# Patient Record
Sex: FEMALE | Race: WHITE | Employment: OTHER | URBAN - NONMETROPOLITAN AREA
[De-identification: names, ages, dates, MRNs, and addresses within clinical notes are randomized per-mention and may not be internally consistent; named-entity substitution may affect disease eponyms.]

---

## 2017-01-26 PROBLEM — J44.1 COPD WITH ACUTE EXACERBATION (HCC): Status: ACTIVE | Noted: 2017-01-26

## 2017-01-26 PROBLEM — R06.02 SHORTNESS OF BREATH: Status: ACTIVE | Noted: 2017-01-26

## 2017-02-06 ENCOUNTER — HOSPITAL ENCOUNTER (OUTPATIENT)
Dept: LAB | Age: 67
Discharge: HOME | End: 2017-02-06
Attending: INTERNAL MEDICINE | Admitting: INTERNAL MEDICINE

## 2017-02-06 DIAGNOSIS — R06.02 SOB (SHORTNESS OF BREATH): ICD-10-CM

## 2017-02-06 LAB
HCT VFR BLD CALC: 40.9 % (ref 37–47)
HEMOGLOBIN: 13.3 GM/DL (ref 12.5–16)
MCH RBC QN AUTO: 31.5 PG (ref 27–31)
MCHC RBC AUTO-ENTMCNC: 32.5 % (ref 32–36)
MCV RBC AUTO: 96.9 FL (ref 78–100)
PDW BLD-RTO: 13.7 % (ref 11.7–14.9)
PLATELET # BLD: 317 K/CU MM (ref 140–440)
PMV BLD AUTO: 9.3 FL (ref 7.5–11.1)
RBC # BLD: 4.22 M/CU MM (ref 4.2–5.4)
WBC # BLD: 11.3 K/CU MM (ref 4–10.5)

## 2017-02-14 ENCOUNTER — HOSPITAL ENCOUNTER (OUTPATIENT)
Dept: CARDIAC REHAB | Age: 67
Discharge: HOME | End: 2017-02-14
Attending: INTERNAL MEDICINE | Admitting: INTERNAL MEDICINE

## 2017-02-15 PROBLEM — J44.9 MODERATE COPD (CHRONIC OBSTRUCTIVE PULMONARY DISEASE) (HCC): Status: ACTIVE | Noted: 2017-02-15

## 2018-02-07 PROBLEM — J18.9 COMMUNITY ACQUIRED PNEUMONIA OF LEFT LUNG: Status: ACTIVE | Noted: 2018-02-07

## 2018-02-08 PROBLEM — E27.8 LEFT ADRENAL MASS (HCC): Chronic | Status: ACTIVE | Noted: 2018-02-08

## 2018-02-10 PROBLEM — J18.9 MULTIFOCAL PNEUMONIA: Status: RESOLVED | Noted: 2018-02-07 | Resolved: 2018-02-10

## 2018-02-10 PROBLEM — J44.1 COPD WITH ACUTE EXACERBATION (HCC): Status: RESOLVED | Noted: 2017-01-26 | Resolved: 2018-02-10

## 2018-02-26 PROBLEM — J44.1 COPD EXACERBATION (HCC): Status: ACTIVE | Noted: 2018-02-26

## 2018-03-06 ENCOUNTER — OFFICE VISIT (OUTPATIENT)
Dept: FAMILY MEDICINE CLINIC | Age: 68
End: 2018-03-06

## 2018-03-06 VITALS
HEIGHT: 60 IN | WEIGHT: 151 LBS | RESPIRATION RATE: 16 BRPM | DIASTOLIC BLOOD PRESSURE: 68 MMHG | OXYGEN SATURATION: 96 % | BODY MASS INDEX: 29.64 KG/M2 | SYSTOLIC BLOOD PRESSURE: 124 MMHG | HEART RATE: 77 BPM

## 2018-03-06 DIAGNOSIS — Z11.59 NEED FOR HEPATITIS C SCREENING TEST: ICD-10-CM

## 2018-03-06 DIAGNOSIS — G89.29 CHRONIC MIDLINE LOW BACK PAIN WITHOUT SCIATICA: ICD-10-CM

## 2018-03-06 DIAGNOSIS — Z00.00 ROUTINE ADULT HEALTH MAINTENANCE: ICD-10-CM

## 2018-03-06 DIAGNOSIS — M54.50 CHRONIC MIDLINE LOW BACK PAIN WITHOUT SCIATICA: ICD-10-CM

## 2018-03-06 DIAGNOSIS — J44.9 MODERATE COPD (CHRONIC OBSTRUCTIVE PULMONARY DISEASE) (HCC): ICD-10-CM

## 2018-03-06 DIAGNOSIS — J44.1 COPD EXACERBATION (HCC): Primary | ICD-10-CM

## 2018-03-06 LAB
A/G RATIO: 1.9 (ref 1.1–2.2)
ALBUMIN SERPL-MCNC: 4.3 G/DL (ref 3.4–5)
ALP BLD-CCNC: 82 U/L (ref 40–129)
ALT SERPL-CCNC: 9 U/L (ref 10–40)
ANION GAP SERPL CALCULATED.3IONS-SCNC: 17 MMOL/L (ref 3–16)
AST SERPL-CCNC: 9 U/L (ref 15–37)
BILIRUB SERPL-MCNC: <0.2 MG/DL (ref 0–1)
BUN BLDV-MCNC: 19 MG/DL (ref 7–20)
CALCIUM SERPL-MCNC: 8.8 MG/DL (ref 8.3–10.6)
CHLORIDE BLD-SCNC: 101 MMOL/L (ref 99–110)
CHOLESTEROL, TOTAL: 234 MG/DL (ref 0–199)
CO2: 23 MMOL/L (ref 21–32)
CREAT SERPL-MCNC: 0.6 MG/DL (ref 0.6–1.2)
GFR AFRICAN AMERICAN: >60
GFR NON-AFRICAN AMERICAN: >60
GLOBULIN: 2.3 G/DL
GLUCOSE BLD-MCNC: 86 MG/DL (ref 70–99)
HCT VFR BLD CALC: 41.9 % (ref 36–48)
HDLC SERPL-MCNC: 100 MG/DL (ref 40–60)
HEMOGLOBIN: 13.9 G/DL (ref 12–16)
HEPATITIS C ANTIBODY INTERPRETATION: NORMAL
LDL CHOLESTEROL CALCULATED: 118 MG/DL
MCH RBC QN AUTO: 32.3 PG (ref 26–34)
MCHC RBC AUTO-ENTMCNC: 33.3 G/DL (ref 31–36)
MCV RBC AUTO: 97 FL (ref 80–100)
PDW BLD-RTO: 14.6 % (ref 12.4–15.4)
PLATELET # BLD: 337 K/UL (ref 135–450)
PMV BLD AUTO: 8.2 FL (ref 5–10.5)
POTASSIUM SERPL-SCNC: 4 MMOL/L (ref 3.5–5.1)
RBC # BLD: 4.32 M/UL (ref 4–5.2)
SODIUM BLD-SCNC: 141 MMOL/L (ref 136–145)
TOTAL PROTEIN: 6.6 G/DL (ref 6.4–8.2)
TRIGL SERPL-MCNC: 79 MG/DL (ref 0–150)
VLDLC SERPL CALC-MCNC: 16 MG/DL
WBC # BLD: 11.6 K/UL (ref 4–11)

## 2018-03-06 PROCEDURE — 1090F PRES/ABSN URINE INCON ASSESS: CPT | Performed by: NURSE PRACTITIONER

## 2018-03-06 PROCEDURE — 1111F DSCHRG MED/CURRENT MED MERGE: CPT | Performed by: NURSE PRACTITIONER

## 2018-03-06 PROCEDURE — G8400 PT W/DXA NO RESULTS DOC: HCPCS | Performed by: NURSE PRACTITIONER

## 2018-03-06 PROCEDURE — G8427 DOCREV CUR MEDS BY ELIG CLIN: HCPCS | Performed by: NURSE PRACTITIONER

## 2018-03-06 PROCEDURE — 3014F SCREEN MAMMO DOC REV: CPT | Performed by: NURSE PRACTITIONER

## 2018-03-06 PROCEDURE — 1123F ACP DISCUSS/DSCN MKR DOCD: CPT | Performed by: NURSE PRACTITIONER

## 2018-03-06 PROCEDURE — 1036F TOBACCO NON-USER: CPT | Performed by: NURSE PRACTITIONER

## 2018-03-06 PROCEDURE — 99203 OFFICE O/P NEW LOW 30 MIN: CPT | Performed by: NURSE PRACTITIONER

## 2018-03-06 PROCEDURE — G8926 SPIRO NO PERF OR DOC: HCPCS | Performed by: NURSE PRACTITIONER

## 2018-03-06 PROCEDURE — 4040F PNEUMOC VAC/ADMIN/RCVD: CPT | Performed by: NURSE PRACTITIONER

## 2018-03-06 PROCEDURE — G8484 FLU IMMUNIZE NO ADMIN: HCPCS | Performed by: NURSE PRACTITIONER

## 2018-03-06 PROCEDURE — 3023F SPIROM DOC REV: CPT | Performed by: NURSE PRACTITIONER

## 2018-03-06 PROCEDURE — G8419 CALC BMI OUT NRM PARAM NOF/U: HCPCS | Performed by: NURSE PRACTITIONER

## 2018-03-06 PROCEDURE — 3017F COLORECTAL CA SCREEN DOC REV: CPT | Performed by: NURSE PRACTITIONER

## 2018-03-06 RX ORDER — CODEINE PHOSPHATE AND GUAIFENESIN 10; 100 MG/5ML; MG/5ML
5 SOLUTION ORAL EVERY 4 HOURS PRN
Qty: 120 ML | Refills: 0 | Status: SHIPPED | OUTPATIENT
Start: 2018-03-06 | End: 2018-03-13

## 2018-03-06 ASSESSMENT — PATIENT HEALTH QUESTIONNAIRE - PHQ9
SUM OF ALL RESPONSES TO PHQ9 QUESTIONS 1 & 2: 0
2. FEELING DOWN, DEPRESSED OR HOPELESS: 0
1. LITTLE INTEREST OR PLEASURE IN DOING THINGS: 0
SUM OF ALL RESPONSES TO PHQ QUESTIONS 1-9: 0

## 2018-03-06 ASSESSMENT — ENCOUNTER SYMPTOMS
SHORTNESS OF BREATH: 1
NAUSEA: 0
ABDOMINAL PAIN: 0
GASTROINTESTINAL NEGATIVE: 1
VOMITING: 0
COUGH: 1
WHEEZING: 1
DIARRHEA: 0
ABDOMINAL DISTENTION: 0

## 2018-03-08 RX ORDER — ATORVASTATIN CALCIUM 40 MG/1
40 TABLET, FILM COATED ORAL DAILY
Qty: 30 TABLET | Refills: 3 | Status: SHIPPED | OUTPATIENT
Start: 2018-03-08 | End: 2018-07-22

## 2018-04-17 ENCOUNTER — OFFICE VISIT (OUTPATIENT)
Dept: FAMILY MEDICINE CLINIC | Age: 68
End: 2018-04-17

## 2018-04-17 VITALS
SYSTOLIC BLOOD PRESSURE: 124 MMHG | OXYGEN SATURATION: 95 % | DIASTOLIC BLOOD PRESSURE: 76 MMHG | BODY MASS INDEX: 30.08 KG/M2 | HEART RATE: 88 BPM | WEIGHT: 154 LBS | RESPIRATION RATE: 15 BRPM

## 2018-04-17 DIAGNOSIS — J44.9 MODERATE COPD (CHRONIC OBSTRUCTIVE PULMONARY DISEASE) (HCC): ICD-10-CM

## 2018-04-17 DIAGNOSIS — G89.29 CHRONIC MIDLINE LOW BACK PAIN WITHOUT SCIATICA: Primary | ICD-10-CM

## 2018-04-17 DIAGNOSIS — M54.50 CHRONIC MIDLINE LOW BACK PAIN WITHOUT SCIATICA: Primary | ICD-10-CM

## 2018-04-17 PROCEDURE — G8926 SPIRO NO PERF OR DOC: HCPCS | Performed by: NURSE PRACTITIONER

## 2018-04-17 PROCEDURE — 1123F ACP DISCUSS/DSCN MKR DOCD: CPT | Performed by: NURSE PRACTITIONER

## 2018-04-17 PROCEDURE — 3023F SPIROM DOC REV: CPT | Performed by: NURSE PRACTITIONER

## 2018-04-17 PROCEDURE — 99213 OFFICE O/P EST LOW 20 MIN: CPT | Performed by: NURSE PRACTITIONER

## 2018-04-17 PROCEDURE — 3017F COLORECTAL CA SCREEN DOC REV: CPT | Performed by: NURSE PRACTITIONER

## 2018-04-17 PROCEDURE — 3014F SCREEN MAMMO DOC REV: CPT | Performed by: NURSE PRACTITIONER

## 2018-04-17 PROCEDURE — 1036F TOBACCO NON-USER: CPT | Performed by: NURSE PRACTITIONER

## 2018-04-17 PROCEDURE — G8427 DOCREV CUR MEDS BY ELIG CLIN: HCPCS | Performed by: NURSE PRACTITIONER

## 2018-04-17 PROCEDURE — G8417 CALC BMI ABV UP PARAM F/U: HCPCS | Performed by: NURSE PRACTITIONER

## 2018-04-17 PROCEDURE — 4040F PNEUMOC VAC/ADMIN/RCVD: CPT | Performed by: NURSE PRACTITIONER

## 2018-04-17 PROCEDURE — G8400 PT W/DXA NO RESULTS DOC: HCPCS | Performed by: NURSE PRACTITIONER

## 2018-04-17 PROCEDURE — 1090F PRES/ABSN URINE INCON ASSESS: CPT | Performed by: NURSE PRACTITIONER

## 2018-04-17 RX ORDER — TRAMADOL HYDROCHLORIDE 50 MG/1
50 TABLET ORAL EVERY 4 HOURS PRN
Qty: 180 TABLET | Refills: 0 | Status: SHIPPED | OUTPATIENT
Start: 2018-04-17 | End: 2018-06-19 | Stop reason: SDUPTHER

## 2018-04-18 PROBLEM — J44.1 COPD EXACERBATION (HCC): Status: RESOLVED | Noted: 2018-02-26 | Resolved: 2018-04-18

## 2018-04-18 PROBLEM — R06.02 SHORTNESS OF BREATH: Status: RESOLVED | Noted: 2017-01-26 | Resolved: 2018-04-18

## 2018-04-18 ASSESSMENT — ENCOUNTER SYMPTOMS
SHORTNESS OF BREATH: 0
BACK PAIN: 1
DIARRHEA: 0
GASTROINTESTINAL NEGATIVE: 1
NAUSEA: 0
ABDOMINAL PAIN: 0
VOMITING: 0
WHEEZING: 1
COUGH: 1

## 2018-05-30 ENCOUNTER — TELEPHONE (OUTPATIENT)
Dept: SLEEP MEDICINE | Age: 68
End: 2018-05-30

## 2018-06-11 ENCOUNTER — TELEPHONE (OUTPATIENT)
Dept: FAMILY MEDICINE CLINIC | Age: 68
End: 2018-06-11

## 2018-06-19 ENCOUNTER — OFFICE VISIT (OUTPATIENT)
Dept: FAMILY MEDICINE CLINIC | Age: 68
End: 2018-06-19

## 2018-06-19 VITALS
BODY MASS INDEX: 28.23 KG/M2 | RESPIRATION RATE: 20 BRPM | DIASTOLIC BLOOD PRESSURE: 66 MMHG | HEART RATE: 84 BPM | OXYGEN SATURATION: 96 % | WEIGHT: 149.4 LBS | SYSTOLIC BLOOD PRESSURE: 114 MMHG

## 2018-06-19 DIAGNOSIS — E27.8 LEFT ADRENAL MASS (HCC): ICD-10-CM

## 2018-06-19 DIAGNOSIS — J44.9 MODERATE COPD (CHRONIC OBSTRUCTIVE PULMONARY DISEASE) (HCC): ICD-10-CM

## 2018-06-19 DIAGNOSIS — G89.29 CHRONIC MIDLINE LOW BACK PAIN WITHOUT SCIATICA: Primary | ICD-10-CM

## 2018-06-19 DIAGNOSIS — M54.50 CHRONIC MIDLINE LOW BACK PAIN WITHOUT SCIATICA: Primary | ICD-10-CM

## 2018-06-19 PROCEDURE — G8427 DOCREV CUR MEDS BY ELIG CLIN: HCPCS | Performed by: NURSE PRACTITIONER

## 2018-06-19 PROCEDURE — 1090F PRES/ABSN URINE INCON ASSESS: CPT | Performed by: NURSE PRACTITIONER

## 2018-06-19 PROCEDURE — G8417 CALC BMI ABV UP PARAM F/U: HCPCS | Performed by: NURSE PRACTITIONER

## 2018-06-19 PROCEDURE — 99214 OFFICE O/P EST MOD 30 MIN: CPT | Performed by: NURSE PRACTITIONER

## 2018-06-19 PROCEDURE — 3017F COLORECTAL CA SCREEN DOC REV: CPT | Performed by: NURSE PRACTITIONER

## 2018-06-19 PROCEDURE — 3023F SPIROM DOC REV: CPT | Performed by: NURSE PRACTITIONER

## 2018-06-19 PROCEDURE — 4040F PNEUMOC VAC/ADMIN/RCVD: CPT | Performed by: NURSE PRACTITIONER

## 2018-06-19 PROCEDURE — G8400 PT W/DXA NO RESULTS DOC: HCPCS | Performed by: NURSE PRACTITIONER

## 2018-06-19 PROCEDURE — 1036F TOBACCO NON-USER: CPT | Performed by: NURSE PRACTITIONER

## 2018-06-19 PROCEDURE — G8926 SPIRO NO PERF OR DOC: HCPCS | Performed by: NURSE PRACTITIONER

## 2018-06-19 PROCEDURE — 1123F ACP DISCUSS/DSCN MKR DOCD: CPT | Performed by: NURSE PRACTITIONER

## 2018-06-19 RX ORDER — TRAZODONE HYDROCHLORIDE 50 MG/1
50 TABLET ORAL NIGHTLY
Qty: 30 TABLET | Refills: 0 | Status: SHIPPED | OUTPATIENT
Start: 2018-06-19 | End: 2018-07-17

## 2018-06-19 RX ORDER — TRAMADOL HYDROCHLORIDE 50 MG/1
50 TABLET ORAL EVERY 6 HOURS PRN
Qty: 120 TABLET | Refills: 0 | Status: SHIPPED | OUTPATIENT
Start: 2018-06-19 | End: 2018-07-19

## 2018-06-19 ASSESSMENT — ENCOUNTER SYMPTOMS
VOMITING: 0
BACK PAIN: 1
GASTROINTESTINAL NEGATIVE: 1
DIARRHEA: 0
NAUSEA: 0
SHORTNESS OF BREATH: 0
COUGH: 1
WHEEZING: 0
ABDOMINAL PAIN: 0

## 2018-07-31 ENCOUNTER — OFFICE VISIT (OUTPATIENT)
Dept: FAMILY MEDICINE CLINIC | Age: 68
End: 2018-07-31

## 2018-07-31 VITALS
RESPIRATION RATE: 16 BRPM | BODY MASS INDEX: 29.16 KG/M2 | WEIGHT: 148.5 LBS | HEART RATE: 85 BPM | SYSTOLIC BLOOD PRESSURE: 118 MMHG | DIASTOLIC BLOOD PRESSURE: 60 MMHG | HEIGHT: 60 IN | OXYGEN SATURATION: 95 %

## 2018-07-31 DIAGNOSIS — M54.50 CHRONIC MIDLINE LOW BACK PAIN WITHOUT SCIATICA: ICD-10-CM

## 2018-07-31 DIAGNOSIS — M79.671 RIGHT FOOT PAIN: Primary | ICD-10-CM

## 2018-07-31 DIAGNOSIS — G89.29 CHRONIC MIDLINE LOW BACK PAIN WITHOUT SCIATICA: ICD-10-CM

## 2018-07-31 PROCEDURE — 4040F PNEUMOC VAC/ADMIN/RCVD: CPT | Performed by: NURSE PRACTITIONER

## 2018-07-31 PROCEDURE — G8400 PT W/DXA NO RESULTS DOC: HCPCS | Performed by: NURSE PRACTITIONER

## 2018-07-31 PROCEDURE — 1036F TOBACCO NON-USER: CPT | Performed by: NURSE PRACTITIONER

## 2018-07-31 PROCEDURE — G8417 CALC BMI ABV UP PARAM F/U: HCPCS | Performed by: NURSE PRACTITIONER

## 2018-07-31 PROCEDURE — 1123F ACP DISCUSS/DSCN MKR DOCD: CPT | Performed by: NURSE PRACTITIONER

## 2018-07-31 PROCEDURE — 1101F PT FALLS ASSESS-DOCD LE1/YR: CPT | Performed by: NURSE PRACTITIONER

## 2018-07-31 PROCEDURE — 3017F COLORECTAL CA SCREEN DOC REV: CPT | Performed by: NURSE PRACTITIONER

## 2018-07-31 PROCEDURE — 1090F PRES/ABSN URINE INCON ASSESS: CPT | Performed by: NURSE PRACTITIONER

## 2018-07-31 PROCEDURE — 99214 OFFICE O/P EST MOD 30 MIN: CPT | Performed by: NURSE PRACTITIONER

## 2018-07-31 PROCEDURE — G8427 DOCREV CUR MEDS BY ELIG CLIN: HCPCS | Performed by: NURSE PRACTITIONER

## 2018-07-31 RX ORDER — TRAMADOL HYDROCHLORIDE 50 MG/1
50 TABLET ORAL EVERY 6 HOURS PRN
Qty: 120 TABLET | Refills: 0 | Status: SHIPPED | OUTPATIENT
Start: 2018-07-31 | End: 2018-09-28 | Stop reason: SDUPTHER

## 2018-07-31 RX ORDER — IBUPROFEN 600 MG/1
600 TABLET ORAL EVERY 8 HOURS PRN
Qty: 90 TABLET | Refills: 0 | Status: SHIPPED | OUTPATIENT
Start: 2018-07-31 | End: 2020-11-12 | Stop reason: ALTCHOICE

## 2018-07-31 RX ORDER — TRAMADOL HYDROCHLORIDE 50 MG/1
50 TABLET ORAL EVERY 6 HOURS PRN
Qty: 120 TABLET | Refills: 0 | Status: SHIPPED | OUTPATIENT
Start: 2018-07-31 | End: 2018-07-31 | Stop reason: SDUPTHER

## 2018-07-31 ASSESSMENT — ENCOUNTER SYMPTOMS
WHEEZING: 0
GASTROINTESTINAL NEGATIVE: 1
COUGH: 0
SHORTNESS OF BREATH: 0
RESPIRATORY NEGATIVE: 1

## 2018-07-31 NOTE — PROGRESS NOTES
SUBJECTIVE:    Gabrielle Villanueva  1950  76 y.o.  female      Chief Complaint   Patient presents with    Foot Injury     Tripped over a rug at home and fell . Seen in ER on 07/15/2018/ Still having pain and swelling. Foot pain      Foot Injury    The incident occurred more than 1 week ago. The incident occurred at home. The injury mechanism was a fall. The pain is present in the right foot. The quality of the pain is described as aching and burning. The pain is at a severity of 5/10. The pain is moderate. The pain has been constant since onset. Pertinent negatives include no inability to bear weight, loss of motion, numbness or tingling. She reports no foreign bodies present. The symptoms are aggravated by weight bearing. She has tried NSAIDs for the symptoms. The treatment provided no relief. No problem-specific Assessment & Plan notes found for this encounter.       Current Outpatient Prescriptions on File Prior to Visit   Medication Sig Dispense Refill    umeclidinium-vilanterol (ANORO ELLIPTA) 62.5-25 MCG/INH AEPB inhaler Inhale 1 puff into the lungs daily 1 each 5    fluticasone (FLOVENT HFA) 110 MCG/ACT inhaler Inhale 2 puffs into the lungs 2 times daily 1 Inhaler 5    albuterol sulfate HFA (VENTOLIN HFA) 108 (90 Base) MCG/ACT inhaler Inhale 2 puffs into the lungs every 6 hours as needed for Wheezing 1 Inhaler 5    theophylline (SARA-24) 100 MG extended release capsule Take 1 capsule by mouth 2 times daily 60 capsule 3    montelukast (SINGULAIR) 10 MG tablet Take 1 tablet by mouth daily 30 tablet 3    ipratropium-albuterol (DUONEB) 0.5-2.5 (3) MG/3ML SOLN nebulizer solution Take 3 mLs by nebulization 4 times daily 360 mL 0    meloxicam (MOBIC) 15 MG tablet Take 1 tablet by mouth daily 30 tablet 0    azithromycin (ZITHROMAX Z-TSERING) 250 MG tablet 2 tabs day 1, 1 tab day 2 to 5 6 tablet 0    acetaminophen (TYLENOL) 325 MG tablet Take 2 tablets by mouth every 4 hours as needed for Pain or Fever 120 tablet 3     No current facility-administered medications on file prior to visit. Review of PMH, PSH, Family Hx, Allergies and updates made as needed. Review of Systems   Constitutional: Negative. Negative for chills, diaphoresis and fever. Respiratory: Negative. Negative for cough, shortness of breath and wheezing. Cardiovascular: Negative. Negative for chest pain, palpitations and leg swelling. Gastrointestinal: Negative. Endocrine: Negative. Musculoskeletal: Positive for arthralgias (Right foot swollen painful) and joint swelling. Neurological: Negative. Negative for tingling and numbness. Psychiatric/Behavioral: Negative. All other systems reviewed and are negative. OBJECTIVE:    /60 (Site: Left Arm, Position: Sitting, Cuff Size: Medium Adult)   Pulse 85   Resp 16   Ht 5' (1.524 m)   Wt 148 lb 8 oz (67.4 kg)   SpO2 95%   BMI 29.00 kg/m²     Physical Exam   Constitutional: She is oriented to person, place, and time. She appears well-developed and well-nourished. HENT:   Head: Normocephalic and atraumatic. Eyes: Pupils are equal, round, and reactive to light. Neck: Normal range of motion. Neck supple. Cardiovascular: Normal rate, regular rhythm and intact distal pulses. Pulmonary/Chest: Effort normal. No respiratory distress. Musculoskeletal: Normal range of motion. Feet:    Swelling at the base of toes 2 and 3. No ecchymosis. No deformity. Pulses normal posterior tibial and dorsalis pedis. She has full range of motion of her ankle. Neurological: She is alert and oriented to person, place, and time. Skin: Skin is warm and dry. Psychiatric: She has a normal mood and affect. Judgment normal.   Vitals reviewed.       ASSESSMENT/PLAN:    Problem List     Chronic midline low back pain without sciatica    Relevant Medications    methylPREDNISolone sodium (SOLU-MEDROL) injection 125 mg (Completed)    methylPREDNISolone sodium

## 2018-09-28 ENCOUNTER — OFFICE VISIT (OUTPATIENT)
Dept: FAMILY MEDICINE CLINIC | Age: 68
End: 2018-09-28
Payer: MEDICARE

## 2018-09-28 ENCOUNTER — TELEPHONE (OUTPATIENT)
Dept: FAMILY MEDICINE CLINIC | Age: 68
End: 2018-09-28

## 2018-09-28 VITALS
RESPIRATION RATE: 20 BRPM | SYSTOLIC BLOOD PRESSURE: 130 MMHG | HEART RATE: 82 BPM | WEIGHT: 148.2 LBS | DIASTOLIC BLOOD PRESSURE: 76 MMHG | OXYGEN SATURATION: 95 % | BODY MASS INDEX: 28 KG/M2

## 2018-09-28 DIAGNOSIS — G89.29 CHRONIC MIDLINE LOW BACK PAIN WITHOUT SCIATICA: ICD-10-CM

## 2018-09-28 DIAGNOSIS — Z23 NEEDS FLU SHOT: ICD-10-CM

## 2018-09-28 DIAGNOSIS — D22.9 SKIN MOLE: ICD-10-CM

## 2018-09-28 DIAGNOSIS — M54.50 CHRONIC MIDLINE LOW BACK PAIN WITHOUT SCIATICA: ICD-10-CM

## 2018-09-28 PROCEDURE — 1090F PRES/ABSN URINE INCON ASSESS: CPT | Performed by: PHYSICIAN ASSISTANT

## 2018-09-28 PROCEDURE — 4040F PNEUMOC VAC/ADMIN/RCVD: CPT | Performed by: PHYSICIAN ASSISTANT

## 2018-09-28 PROCEDURE — 90686 IIV4 VACC NO PRSV 0.5 ML IM: CPT | Performed by: PHYSICIAN ASSISTANT

## 2018-09-28 PROCEDURE — G8417 CALC BMI ABV UP PARAM F/U: HCPCS | Performed by: PHYSICIAN ASSISTANT

## 2018-09-28 PROCEDURE — 99213 OFFICE O/P EST LOW 20 MIN: CPT | Performed by: PHYSICIAN ASSISTANT

## 2018-09-28 PROCEDURE — 1036F TOBACCO NON-USER: CPT | Performed by: PHYSICIAN ASSISTANT

## 2018-09-28 PROCEDURE — G8427 DOCREV CUR MEDS BY ELIG CLIN: HCPCS | Performed by: PHYSICIAN ASSISTANT

## 2018-09-28 PROCEDURE — 3017F COLORECTAL CA SCREEN DOC REV: CPT | Performed by: PHYSICIAN ASSISTANT

## 2018-09-28 PROCEDURE — 1101F PT FALLS ASSESS-DOCD LE1/YR: CPT | Performed by: PHYSICIAN ASSISTANT

## 2018-09-28 PROCEDURE — G8400 PT W/DXA NO RESULTS DOC: HCPCS | Performed by: PHYSICIAN ASSISTANT

## 2018-09-28 PROCEDURE — 1123F ACP DISCUSS/DSCN MKR DOCD: CPT | Performed by: PHYSICIAN ASSISTANT

## 2018-09-28 PROCEDURE — G0008 ADMIN INFLUENZA VIRUS VAC: HCPCS | Performed by: PHYSICIAN ASSISTANT

## 2018-09-28 RX ORDER — TRAMADOL HYDROCHLORIDE 50 MG/1
50 TABLET ORAL EVERY 6 HOURS PRN
Qty: 120 TABLET | Refills: 0 | Status: SHIPPED | OUTPATIENT
Start: 2018-09-28 | End: 2018-10-31 | Stop reason: SDUPTHER

## 2018-09-28 ASSESSMENT — ENCOUNTER SYMPTOMS: BACK PAIN: 1

## 2018-09-28 NOTE — PATIENT INSTRUCTIONS
Patient Education        Back Pain: Care Instructions  Your Care Instructions    Back pain has many possible causes. It is often related to problems with muscles and ligaments of the back. It may also be related to problems with the nerves, discs, or bones of the back. Moving, lifting, standing, sitting, or sleeping in an awkward way can strain the back. Sometimes you don't notice the injury until later. Arthritis is another common cause of back pain. Although it may hurt a lot, back pain usually improves on its own within several weeks. Most people recover in 12 weeks or less. Using good home treatment and being careful not to stress your back can help you feel better sooner. Follow-up care is a key part of your treatment and safety. Be sure to make and go to all appointments, and call your doctor if you are having problems. It's also a good idea to know your test results and keep a list of the medicines you take. How can you care for yourself at home? · Sit or lie in positions that are most comfortable and reduce your pain. Try one of these positions when you lie down:  ¨ Lie on your back with your knees bent and supported by large pillows. ¨ Lie on the floor with your legs on the seat of a sofa or chair. Janice Annalise on your side with your knees and hips bent and a pillow between your legs. ¨ Lie on your stomach if it does not make pain worse. · Do not sit up in bed, and avoid soft couches and twisted positions. Bed rest can help relieve pain at first, but it delays healing. Avoid bed rest after the first day of back pain. · Change positions every 30 minutes. If you must sit for long periods of time, take breaks from sitting. Get up and walk around, or lie in a comfortable position. · Try using a heating pad on a low or medium setting for 15 to 20 minutes every 2 or 3 hours. Try a warm shower in place of one session with the heating pad.   · You can also try an ice pack for 10 to 15 minutes every 2 to 3 hours. Put a thin cloth between the ice pack and your skin. · Take pain medicines exactly as directed. ¨ If the doctor gave you a prescription medicine for pain, take it as prescribed. ¨ If you are not taking a prescription pain medicine, ask your doctor if you can take an over-the-counter medicine. · Take short walks several times a day. You can start with 5 to 10 minutes, 3 or 4 times a day, and work up to longer walks. Walk on level surfaces and avoid hills and stairs until your back is better. · Return to work and other activities as soon as you can. Continued rest without activity is usually not good for your back. · To prevent future back pain, do exercises to stretch and strengthen your back and stomach. Learn how to use good posture, safe lifting techniques, and proper body mechanics. When should you call for help? Call your doctor now or seek immediate medical care if:    · You have new or worsening numbness in your legs.     · You have new or worsening weakness in your legs. (This could make it hard to stand up.)     · You lose control of your bladder or bowels.    Watch closely for changes in your health, and be sure to contact your doctor if:    · You have a fever, lose weight, or don't feel well.     · You do not get better as expected. Where can you learn more? Go to [email or web address] org and sign in to your [employer] account. Enter T081 in the [facility] box to learn more about \"Back Pain: Care Instructions. \"     If you do not have an account, please click on the \"Sign Up Now\" link. Current as of: November 29, 2017  Content Version: 11.7  © 6907-0154 [employer]. Care instructions adapted under license by [facility] ([facility]). If you have questions about a medical condition or this instruction, always ask your healthcare professional. [address] any warranty or liability for your use of this information.

## 2018-09-28 NOTE — TELEPHONE ENCOUNTER
Cld & left vm asking for a return call. When pt calls back advise to get routine mammogram scheduled. Give her the ph# to Go [phone], no order needed.

## 2018-09-28 NOTE — PROGRESS NOTES
Miguel Angel Rouse  1950  76 y.o.  female    SUBJECTIVE:    Chief Complaint   Patient presents with    Mole     t has a mole on her face that she wants removed, it is itching, and bothering her, also states it is getting bigger    Medication Refill     Tramadol    Flu Vaccine       HPI   Back pain-chronic, low back, tramadol helps. Mole-on right cheek, x years, seems to be getting and itches now. Pt would like it removed. Current Outpatient Prescriptions on File Prior to Visit   Medication Sig Dispense Refill    umeclidinium-vilanterol (ANORO ELLIPTA) 62.5-25 MCG/INH AEPB inhaler Inhale 1 puff into the lungs daily 1 each 5    UNABLE TO FIND Zaki shoe- firm sole. Fit to bottom of foot. Right foot 1 each 0    ibuprofen (ADVIL;MOTRIN) 600 MG tablet Take 1 tablet by mouth every 8 hours as needed for Pain 90 tablet 0    meloxicam (MOBIC) 15 MG tablet Take 1 tablet by mouth daily 30 tablet 0    fluticasone (FLOVENT HFA) 110 MCG/ACT inhaler Inhale 2 puffs into the lungs 2 times daily 1 Inhaler 5    albuterol sulfate HFA (VENTOLIN HFA) 108 (90 Base) MCG/ACT inhaler Inhale 2 puffs into the lungs every 6 hours as needed for Wheezing 1 Inhaler 5    theophylline (SARA-24) 100 MG extended release capsule Take 1 capsule by mouth 2 times daily 60 capsule 3    montelukast (SINGULAIR) 10 MG tablet Take 1 tablet by mouth daily 30 tablet 3    ipratropium-albuterol (DUONEB) 0.5-2.5 (3) MG/3ML SOLN nebulizer solution Take 3 mLs by nebulization 4 times daily 360 mL 0    acetaminophen (TYLENOL) 325 MG tablet Take 2 tablets by mouth every 4 hours as needed for Pain or Fever 120 tablet 3    azithromycin (ZITHROMAX Z-TSERING) 250 MG tablet 2 tabs day 1, 1 tab day 2 to 5 6 tablet 0     No current facility-administered medications on file prior to visit. Review of PMH, PSH, Family Hx, Allergies and updates made as needed.     No Known Allergies    Past Medical History:   Diagnosis Date    Asthma     Back ache

## 2018-10-31 ENCOUNTER — OFFICE VISIT (OUTPATIENT)
Dept: FAMILY MEDICINE CLINIC | Age: 68
End: 2018-10-31
Payer: MEDICARE

## 2018-10-31 VITALS
HEART RATE: 77 BPM | WEIGHT: 150.6 LBS | HEIGHT: 61 IN | RESPIRATION RATE: 16 BRPM | SYSTOLIC BLOOD PRESSURE: 102 MMHG | BODY MASS INDEX: 28.43 KG/M2 | DIASTOLIC BLOOD PRESSURE: 70 MMHG

## 2018-10-31 DIAGNOSIS — Z51.81 MEDICATION MONITORING ENCOUNTER: ICD-10-CM

## 2018-10-31 DIAGNOSIS — G89.29 CHRONIC MIDLINE LOW BACK PAIN WITHOUT SCIATICA: ICD-10-CM

## 2018-10-31 DIAGNOSIS — M54.50 CHRONIC MIDLINE LOW BACK PAIN WITHOUT SCIATICA: ICD-10-CM

## 2018-10-31 DIAGNOSIS — Z91.81 AT HIGH RISK FOR FALLS: ICD-10-CM

## 2018-10-31 DIAGNOSIS — R45.84 ANHEDONIA: ICD-10-CM

## 2018-10-31 DIAGNOSIS — Z51.81 MEDICATION MONITORING ENCOUNTER: Primary | ICD-10-CM

## 2018-10-31 DIAGNOSIS — R53.83 OTHER FATIGUE: ICD-10-CM

## 2018-10-31 DIAGNOSIS — J44.9 MODERATE COPD (CHRONIC OBSTRUCTIVE PULMONARY DISEASE) (HCC): ICD-10-CM

## 2018-10-31 LAB
AMPHETAMINE SCREEN, URINE: NEGATIVE
BARBITURATE SCREEN, URINE: NEGATIVE
BENZODIAZEPINE SCREEN, URINE: NEGATIVE
BUPRENORPHINE URINE: NEGATIVE
COCAINE METABOLITE SCREEN URINE: NEGATIVE
GABAPENTIN SCREEN, URINE: ABNORMAL
METHADONE SCREEN, URINE: NEGATIVE
METHAMPHETAMINE, URINE: NEGATIVE
OPIATE SCREEN URINE: NEGATIVE
OXYCODONE SCREEN URINE: NEGATIVE
PHENCYCLIDINE SCREEN URINE: NEGATIVE
PROPOXYPHENE SCREEN, URINE: ABNORMAL
THC SCREEN, URINE: NEGATIVE
TRICYCLIC ANTIDEPRESSANTS, UR: ABNORMAL

## 2018-10-31 PROCEDURE — 3023F SPIROM DOC REV: CPT | Performed by: NURSE PRACTITIONER

## 2018-10-31 PROCEDURE — 1101F PT FALLS ASSESS-DOCD LE1/YR: CPT | Performed by: NURSE PRACTITIONER

## 2018-10-31 PROCEDURE — G8482 FLU IMMUNIZE ORDER/ADMIN: HCPCS | Performed by: NURSE PRACTITIONER

## 2018-10-31 PROCEDURE — G8926 SPIRO NO PERF OR DOC: HCPCS | Performed by: NURSE PRACTITIONER

## 2018-10-31 PROCEDURE — 3017F COLORECTAL CA SCREEN DOC REV: CPT | Performed by: NURSE PRACTITIONER

## 2018-10-31 PROCEDURE — 1123F ACP DISCUSS/DSCN MKR DOCD: CPT | Performed by: NURSE PRACTITIONER

## 2018-10-31 PROCEDURE — 4040F PNEUMOC VAC/ADMIN/RCVD: CPT | Performed by: NURSE PRACTITIONER

## 2018-10-31 PROCEDURE — G8427 DOCREV CUR MEDS BY ELIG CLIN: HCPCS | Performed by: NURSE PRACTITIONER

## 2018-10-31 PROCEDURE — 1090F PRES/ABSN URINE INCON ASSESS: CPT | Performed by: NURSE PRACTITIONER

## 2018-10-31 PROCEDURE — 99214 OFFICE O/P EST MOD 30 MIN: CPT | Performed by: NURSE PRACTITIONER

## 2018-10-31 PROCEDURE — 80305 DRUG TEST PRSMV DIR OPT OBS: CPT | Performed by: NURSE PRACTITIONER

## 2018-10-31 PROCEDURE — G8400 PT W/DXA NO RESULTS DOC: HCPCS | Performed by: NURSE PRACTITIONER

## 2018-10-31 PROCEDURE — G8417 CALC BMI ABV UP PARAM F/U: HCPCS | Performed by: NURSE PRACTITIONER

## 2018-10-31 PROCEDURE — 1036F TOBACCO NON-USER: CPT | Performed by: NURSE PRACTITIONER

## 2018-10-31 RX ORDER — DULOXETIN HYDROCHLORIDE 30 MG/1
30 CAPSULE, DELAYED RELEASE ORAL DAILY
Qty: 30 CAPSULE | Refills: 1 | Status: SHIPPED | OUTPATIENT
Start: 2018-10-31 | End: 2018-12-12 | Stop reason: ALTCHOICE

## 2018-10-31 RX ORDER — TRAMADOL HYDROCHLORIDE 50 MG/1
50 TABLET ORAL EVERY 8 HOURS PRN
Qty: 90 TABLET | Refills: 0 | Status: SHIPPED | OUTPATIENT
Start: 2018-10-31 | End: 2018-12-30

## 2018-10-31 ASSESSMENT — PATIENT HEALTH QUESTIONNAIRE - PHQ9
SUM OF ALL RESPONSES TO PHQ QUESTIONS 1-9: 2
2. FEELING DOWN, DEPRESSED OR HOPELESS: 1
SUM OF ALL RESPONSES TO PHQ QUESTIONS 1-9: 2
SUM OF ALL RESPONSES TO PHQ9 QUESTIONS 1 & 2: 2
1. LITTLE INTEREST OR PLEASURE IN DOING THINGS: 1

## 2018-10-31 ASSESSMENT — ENCOUNTER SYMPTOMS
CHEST TIGHTNESS: 0
RESPIRATORY NEGATIVE: 1
EYE PAIN: 0
NAUSEA: 0
VOMITING: 0
GASTROINTESTINAL NEGATIVE: 1
BACK PAIN: 1
EYES NEGATIVE: 1
COUGH: 0
SHORTNESS OF BREATH: 0
ABDOMINAL PAIN: 0

## 2018-10-31 NOTE — ASSESSMENT & PLAN NOTE
Patient reports that she's feeling \"blah\". She has a lack of motivation and just feels tired all the time. She does endorse anhedonia. She feels like she procrastinates her normal household chores.

## 2018-10-31 NOTE — PROGRESS NOTES
(Completed)    promethazine-codeine (PHENERGAN with CODEINE) 6.25-10 MG/5ML syrup 5 mL (Completed)    benzonatate (TESSALON) capsule 200 mg (Completed)    predniSONE (DELTASONE) tablet 60 mg (Completed)    ipratropium-albuterol (DUONEB) nebulizer solution 1 ampule (Completed)    methylPREDNISolone sodium (SOLU-MEDROL) injection 125 mg (Completed)    ipratropium-albuterol (DUONEB) nebulizer solution 1 ampule (Completed)    ipratropium-albuterol (DUONEB) nebulizer solution 1 ampule (Completed)    methylPREDNISolone sodium (SOLU-MEDROL) injection 125 mg (Completed)    ipratropium-albuterol (DUONEB) nebulizer solution 1 ampule (Completed)    ipratropium-albuterol (DUONEB) nebulizer solution 1 ampule (Completed)    ipratropium-albuterol (DUONEB) nebulizer solution 1 ampule (Completed)    hydrocodone-chlorpheniramine (TUSSIONEX) 10-8 MG/5ML oral suspension 5 mL (Completed)    methylPREDNISolone sodium (SOLU-MEDROL) injection 60 mg (Completed)    ipratropium-albuterol (DUONEB) nebulizer solution 1 ampule (Completed)    promethazine (PHENERGAN) injection 12.5 mg (Completed)    ipratropium-albuterol (DUONEB) nebulizer solution 1 ampule (Completed)    ipratropium-albuterol (DUONEB) nebulizer solution 1 ampule (Completed)    methylPREDNISolone sodium (SOLU-MEDROL) injection 60 mg (Completed)    ipratropium-albuterol (DUONEB) 0.5-2.5 (3) MG/3ML SOLN nebulizer solution    theophylline (SARA-24) 100 MG extended release capsule    fluticasone (FLOVENT HFA) 110 MCG/ACT inhaler    albuterol sulfate HFA (VENTOLIN HFA) 108 (90 Base) MCG/ACT inhaler    umeclidinium-vilanterol (ANORO ELLIPTA) 62.5-25 MCG/INH AEPB inhaler    montelukast (SINGULAIR) 10 MG tablet    Other fatigue     Patient reports that she's feeling \"blah\". She has a lack of motivation and just feels tired all the time. She does endorse anhedonia. She feels like she procrastinates her normal household chores.                Current Outpatient Prescriptions   Medication capsule; Refill: 1  - Drug Panel-PM-HI Res-UR Interp-A; Future    2. Medication monitoring encounter  And for confirmation.  - POCT Rapid Drug Screen  - Drug Panel-PM-HI Res-UR Interp-A; Future    3. At high risk for falls  Counseled on safety encouraged activity    4. Other fatigue  Rest, exercise, well-balanced diet. We will start antidepressants to see if this helps with her feeling of fatigue    5. Anhedonia as above  - DULoxetine (CYMBALTA) 30 MG extended release capsule; Take 1 capsule by mouth daily  Dispense: 30 capsule; Refill: 1    6. Moderate COPD (chronic obstructive pulmonary disease) (HCC)  Continue current medications. Follow-up if any problems      Return in about 6 weeks (around 12/12/2018) for mood. Controlled substance monitoring (if applicable to pt. Visit)             (Please note that portions of this note may have beencompleted with a voice recognition program. Efforts were made to edit the dictations but occasionally words are mis-transcribed.)      On the basis of positive falls risk screening, assessment and plan is as follows: home safety tips provided.

## 2018-11-05 LAB
6-ACETYLMORPHINE: NOT DETECTED
7-AMINOCLONAZEPAM: NOT DETECTED
ALPHA-OH-ALPRAZOLAM: NOT DETECTED
ALPRAZOLAM: NOT DETECTED
AMPHETAMINE: NOT DETECTED
BARBITURATES: NOT DETECTED
BENZOYLECGONINE: NOT DETECTED
BUPRENORPHINE: NOT DETECTED
CARISOPRODOL: NOT DETECTED
CLONAZEPAM: NOT DETECTED
CODEINE: NOT DETECTED
CREATININE URINE: 24.8 MG/DL (ref 20–400)
DIAZEPAM: NOT DETECTED
DRUGS EXPECTED: NORMAL
EER PAIN MGT DRUG PANEL, HIGH RES/EMIT U: NORMAL
ETHYL GLUCURONIDE: NOT DETECTED
FENTANYL: NOT DETECTED
HYDROCODONE: NOT DETECTED
HYDROMORPHONE: NOT DETECTED
LORAZEPAM: NOT DETECTED
MARIJUANA METABOLITE: NOT DETECTED
MDA: NOT DETECTED
MDEA: NOT DETECTED
MDMA URINE: NOT DETECTED
MEPERIDINE: NOT DETECTED
METHADONE: NOT DETECTED
METHAMPHETAMINE: NOT DETECTED
METHYLPHENIDATE: NOT DETECTED
MIDAZOLAM: NOT DETECTED
MORPHINE: NOT DETECTED
NORBUPRENORPHINE, FREE: NOT DETECTED
NORDIAZEPAM: NOT DETECTED
NORFENTANYL: NOT DETECTED
NORHYDROCODONE, URINE: NOT DETECTED
NOROXYCODONE: NOT DETECTED
NOROXYMORPHONE, URINE: NOT DETECTED
OXAZEPAM: NOT DETECTED
OXYCODONE: NOT DETECTED
OXYMORPHONE: NOT DETECTED
PAIN MANAGEMENT DRUG PANEL: NORMAL
PAIN MANAGEMENT DRUG PANEL: NORMAL
PCP: NOT DETECTED
PHENTERMINE: NOT DETECTED
PROPOXYPHENE: NOT DETECTED
TAPENTADOL, URINE: NOT DETECTED
TAPENTADOL-O-SULFATE, URINE: NOT DETECTED
TEMAZEPAM: NOT DETECTED
TRAMADOL: NOT DETECTED
ZOLPIDEM: NOT DETECTED

## 2018-12-12 ENCOUNTER — OFFICE VISIT (OUTPATIENT)
Dept: FAMILY MEDICINE CLINIC | Age: 68
End: 2018-12-12
Payer: MEDICARE

## 2018-12-12 VITALS
OXYGEN SATURATION: 96 % | BODY MASS INDEX: 28.34 KG/M2 | HEART RATE: 84 BPM | SYSTOLIC BLOOD PRESSURE: 122 MMHG | WEIGHT: 150 LBS | DIASTOLIC BLOOD PRESSURE: 60 MMHG | RESPIRATION RATE: 20 BRPM

## 2018-12-12 DIAGNOSIS — R45.84 ANHEDONIA: ICD-10-CM

## 2018-12-12 DIAGNOSIS — J44.9 MODERATE COPD (CHRONIC OBSTRUCTIVE PULMONARY DISEASE) (HCC): ICD-10-CM

## 2018-12-12 DIAGNOSIS — M54.50 CHRONIC MIDLINE LOW BACK PAIN WITHOUT SCIATICA: Primary | ICD-10-CM

## 2018-12-12 DIAGNOSIS — F51.04 PSYCHOPHYSIOLOGICAL INSOMNIA: ICD-10-CM

## 2018-12-12 DIAGNOSIS — G89.29 CHRONIC MIDLINE LOW BACK PAIN WITHOUT SCIATICA: Primary | ICD-10-CM

## 2018-12-12 PROCEDURE — G8926 SPIRO NO PERF OR DOC: HCPCS | Performed by: NURSE PRACTITIONER

## 2018-12-12 PROCEDURE — 1123F ACP DISCUSS/DSCN MKR DOCD: CPT | Performed by: NURSE PRACTITIONER

## 2018-12-12 PROCEDURE — G8427 DOCREV CUR MEDS BY ELIG CLIN: HCPCS | Performed by: NURSE PRACTITIONER

## 2018-12-12 PROCEDURE — 3017F COLORECTAL CA SCREEN DOC REV: CPT | Performed by: NURSE PRACTITIONER

## 2018-12-12 PROCEDURE — 1101F PT FALLS ASSESS-DOCD LE1/YR: CPT | Performed by: NURSE PRACTITIONER

## 2018-12-12 PROCEDURE — 4040F PNEUMOC VAC/ADMIN/RCVD: CPT | Performed by: NURSE PRACTITIONER

## 2018-12-12 PROCEDURE — G8482 FLU IMMUNIZE ORDER/ADMIN: HCPCS | Performed by: NURSE PRACTITIONER

## 2018-12-12 PROCEDURE — G8417 CALC BMI ABV UP PARAM F/U: HCPCS | Performed by: NURSE PRACTITIONER

## 2018-12-12 PROCEDURE — 1090F PRES/ABSN URINE INCON ASSESS: CPT | Performed by: NURSE PRACTITIONER

## 2018-12-12 PROCEDURE — G8400 PT W/DXA NO RESULTS DOC: HCPCS | Performed by: NURSE PRACTITIONER

## 2018-12-12 PROCEDURE — 3023F SPIROM DOC REV: CPT | Performed by: NURSE PRACTITIONER

## 2018-12-12 PROCEDURE — 1036F TOBACCO NON-USER: CPT | Performed by: NURSE PRACTITIONER

## 2018-12-12 PROCEDURE — 99214 OFFICE O/P EST MOD 30 MIN: CPT | Performed by: NURSE PRACTITIONER

## 2018-12-12 RX ORDER — TRAMADOL HYDROCHLORIDE 50 MG/1
50 TABLET ORAL EVERY 6 HOURS PRN
Qty: 120 TABLET | Refills: 0 | Status: SHIPPED | OUTPATIENT
Start: 2018-12-12 | End: 2019-01-23 | Stop reason: SDUPTHER

## 2018-12-12 RX ORDER — TRAZODONE HYDROCHLORIDE 50 MG/1
50 TABLET ORAL NIGHTLY
Qty: 30 TABLET | Refills: 1 | Status: SHIPPED | OUTPATIENT
Start: 2018-12-12 | End: 2019-02-26 | Stop reason: SDUPTHER

## 2018-12-12 RX ORDER — FLUOXETINE HYDROCHLORIDE 20 MG/1
20 CAPSULE ORAL DAILY
Qty: 30 CAPSULE | Refills: 2 | Status: SHIPPED | OUTPATIENT
Start: 2018-12-12 | End: 2019-01-23

## 2018-12-12 NOTE — LETTER
[facility]  [address] [facility]  [address]. [place] [address]  Phone: [phone]  Fax: [phone]      December 12, 2018     Henrique Montelongo  [address] [place] [id number]      Dear Brandon Dai:    I am writing you because I have been informed of your missed appointment on 12/12/18. We ask that you please call 24 hours in advance if you are unable to make your appointment so that we can give that time to another patient in need. We care about you and the management of your healthcare and want to make sure that you follow up as recommended. I have to also mention, that in an effort to provide quality care and timely appointments to all my patients, it is our policy that patients be discharged from the practice if they do not show for three scheduled appointments. You would be informed of this termination by mail, and would have 30 days from the date of discharge to locate another physician. We're sorry you were unable to keep your appointment and hope that you are doing well. We would like to continue treating your healthcare needs. Please call the office to let us know your plans for treatment and to reschedule your appointment.      Sincerely,        LAMBERT Lombardo - CNP

## 2018-12-12 NOTE — PROGRESS NOTES
by mouth 2 times daily 60 capsule 3    ipratropium-albuterol (DUONEB) 0.5-2.5 (3) MG/3ML SOLN nebulizer solution Take 3 mLs by nebulization 4 times daily 360 mL 0    acetaminophen (TYLENOL) 325 MG tablet Take 2 tablets by mouth every 4 hours as needed for Pain or Fever 120 tablet 3     No current facility-administered medications on file prior to visit. Review of PMH, PSH, Family Hx, Allergies and updates made as needed. Review of Systems   Constitutional: Negative. Negative for fatigue and unexpected weight change. Eyes: Negative. Negative for pain and visual disturbance. Respiratory: Negative. Negative for cough, chest tightness and shortness of breath. Cardiovascular: Negative. Negative for chest pain, palpitations and leg swelling. Gastrointestinal: Negative. Negative for abdominal pain and nausea. Endocrine: Negative. Musculoskeletal: Positive for arthralgias, back pain and myalgias. Neurological: Negative. Negative for dizziness, syncope, facial asymmetry, speech difficulty, light-headedness, numbness and headaches. Psychiatric/Behavioral: Positive for dysphoric mood. The patient is not nervous/anxious. All other systems reviewed and are negative. OBJECTIVE:    /60   Pulse 84   Resp 20   Wt 150 lb (68 kg)   SpO2 96%   BMI 28.34 kg/m²     Physical Exam   Constitutional: She is oriented to person, place, and time. She appears well-developed and well-nourished. HENT:   Head: Normocephalic and atraumatic. Eyes: Pupils are equal, round, and reactive to light. Neck: Normal range of motion. Neck supple. Cardiovascular: Normal rate, regular rhythm, normal heart sounds and intact distal pulses. Exam reveals no friction rub. No murmur heard. Pulmonary/Chest: Effort normal and breath sounds normal. No respiratory distress. She has no wheezes. Abdominal: Soft. Musculoskeletal:   Steady, non antalgic gait.    Able to move in exam room with slow benzonatate (TESSALON) capsule 200 mg (Completed)    predniSONE (DELTASONE) tablet 60 mg (Completed)    ipratropium-albuterol (DUONEB) nebulizer solution 1 ampule (Completed)    methylPREDNISolone sodium (SOLU-MEDROL) injection 125 mg (Completed)    ipratropium-albuterol (DUONEB) nebulizer solution 1 ampule (Completed)    ipratropium-albuterol (DUONEB) nebulizer solution 1 ampule (Completed)    methylPREDNISolone sodium (SOLU-MEDROL) injection 125 mg (Completed)    ipratropium-albuterol (DUONEB) nebulizer solution 1 ampule (Completed)    ipratropium-albuterol (DUONEB) nebulizer solution 1 ampule (Completed)    ipratropium-albuterol (DUONEB) nebulizer solution 1 ampule (Completed)    hydrocodone-chlorpheniramine (TUSSIONEX) 10-8 MG/5ML oral suspension 5 mL (Completed)    methylPREDNISolone sodium (SOLU-MEDROL) injection 60 mg (Completed)    ipratropium-albuterol (DUONEB) nebulizer solution 1 ampule (Completed)    promethazine (PHENERGAN) injection 12.5 mg (Completed)    ipratropium-albuterol (DUONEB) nebulizer solution 1 ampule (Completed)    ipratropium-albuterol (DUONEB) nebulizer solution 1 ampule (Completed)    methylPREDNISolone sodium (SOLU-MEDROL) injection 60 mg (Completed)    ipratropium-albuterol (DUONEB) 0.5-2.5 (3) MG/3ML SOLN nebulizer solution    theophylline (SARA-24) 100 MG extended release capsule    fluticasone (FLOVENT HFA) 110 MCG/ACT inhaler    albuterol sulfate HFA (VENTOLIN HFA) 108 (90 Base) MCG/ACT inhaler    montelukast (SINGULAIR) 10 MG tablet    umeclidinium-vilanterol (ANORO ELLIPTA) 62.5-25 MCG/INH AEPB inhaler          Current Outpatient Prescriptions   Medication Sig Dispense Refill    FLUoxetine (PROZAC) 20 MG capsule Take 1 capsule by mouth daily 30 capsule 2    traMADol (ULTRAM) 50 MG tablet Take 1 tablet by mouth every 6 hours as needed for Pain for up to 30 days. . 120 tablet 0    traZODone (DESYREL) 50 MG tablet Take 1 tablet by mouth nightly 30 tablet 1    umeclidinium-vilanterol (ANORO ELLIPTA) 62.5-25 MCG/INH AEPB inhaler Inhale 1 puff into the lungs daily 1 each 5    traMADol (ULTRAM) 50 MG tablet Take 1 tablet by mouth every 8 hours as needed for Pain for up to 60 days. . 90 tablet 0    montelukast (SINGULAIR) 10 MG tablet TAKE ONE TABLET BY MOUTH ONCE DAILY 30 tablet 2    UNABLE TO FIND Zaki shoe- firm sole. Fit to bottom of foot. Right foot 1 each 0    ibuprofen (ADVIL;MOTRIN) 600 MG tablet Take 1 tablet by mouth every 8 hours as needed for Pain 90 tablet 0    meloxicam (MOBIC) 15 MG tablet Take 1 tablet by mouth daily 30 tablet 0    fluticasone (FLOVENT HFA) 110 MCG/ACT inhaler Inhale 2 puffs into the lungs 2 times daily 1 Inhaler 5    albuterol sulfate HFA (VENTOLIN HFA) 108 (90 Base) MCG/ACT inhaler Inhale 2 puffs into the lungs every 6 hours as needed for Wheezing 1 Inhaler 5    theophylline (SARA-24) 100 MG extended release capsule Take 1 capsule by mouth 2 times daily 60 capsule 3    ipratropium-albuterol (DUONEB) 0.5-2.5 (3) MG/3ML SOLN nebulizer solution Take 3 mLs by nebulization 4 times daily 360 mL 0    acetaminophen (TYLENOL) 325 MG tablet Take 2 tablets by mouth every 4 hours as needed for Pain or Fever 120 tablet 3     No current facility-administered medications for this visit. 1. Anhedonia  Start prozac. FU in 6 weeks. - FLUoxetine (PROZAC) 20 MG capsule; Take 1 capsule by mouth daily  Dispense: 30 capsule; Refill: 2    2. Chronic midline low back pain without sciatica  Will RX tramadol . 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 Allows her to move and interact with them with less pain. Reviewed [place] and is approparite. - traMADol (ULTRAM) 50 MG tablet; Take 1 tablet by mouth every 6 hours as needed for Pain for up to 30 days. .  Dispense: 120 tablet; Refill: 0    3. Moderate COPD (chronic obstructive pulmonary disease) (HCC)  Stopped smoking.  The patient denies cough, chest pain, dyspnea, wheezing or hemoptysis. 4. Psychophysiological insomnia  Trazodone nightly. FU in 6 weeks. - traZODone (DESYREL) 50 MG tablet; Take 1 tablet by mouth nightly  Dispense: 30 tablet; Refill: 1      Return in about 6 weeks (around 1/23/2019). Controlled substance monitoring (if applicable to pt. Visit)  Attestation: The Prescription Monitoring Report for this patient was reviewed today. LAMBERT Plascencia CNP)  Documentation: Possible medication side effects, risk of tolerance/dependence & alternative treatments discussed., No signs of potential drug abuse or diversion identified. LAMBERT Plascencia CNP)  Medication Contracts: Existing medication contract.  LAMBERT Plascencia CNP)          (Please note that portions of this note may have beencompleted with a voice recognition program. Efforts were made to edit the dictations but occasionally words are mis-transcribed.)

## 2018-12-13 ASSESSMENT — ENCOUNTER SYMPTOMS
COUGH: 0
RESPIRATORY NEGATIVE: 1
EYES NEGATIVE: 1
EYE PAIN: 0
ABDOMINAL PAIN: 0
CHEST TIGHTNESS: 0
SHORTNESS OF BREATH: 0
BACK PAIN: 1
NAUSEA: 0
GASTROINTESTINAL NEGATIVE: 1

## 2019-01-23 ENCOUNTER — OFFICE VISIT (OUTPATIENT)
Dept: FAMILY MEDICINE CLINIC | Age: 69
End: 2019-01-23
Payer: MEDICARE

## 2019-01-23 VITALS
DIASTOLIC BLOOD PRESSURE: 58 MMHG | WEIGHT: 152 LBS | HEART RATE: 88 BPM | SYSTOLIC BLOOD PRESSURE: 106 MMHG | OXYGEN SATURATION: 97 % | RESPIRATION RATE: 20 BRPM | BODY MASS INDEX: 28.72 KG/M2

## 2019-01-23 DIAGNOSIS — M54.50 LOW BACK PAIN RADIATING TO LEFT LOWER EXTREMITY: Primary | ICD-10-CM

## 2019-01-23 DIAGNOSIS — Z12.11 SCREENING FOR COLON CANCER: ICD-10-CM

## 2019-01-23 DIAGNOSIS — J44.9 MODERATE COPD (CHRONIC OBSTRUCTIVE PULMONARY DISEASE) (HCC): ICD-10-CM

## 2019-01-23 DIAGNOSIS — M54.50 CHRONIC MIDLINE LOW BACK PAIN WITHOUT SCIATICA: ICD-10-CM

## 2019-01-23 DIAGNOSIS — M79.605 LOW BACK PAIN RADIATING TO LEFT LOWER EXTREMITY: Primary | ICD-10-CM

## 2019-01-23 DIAGNOSIS — E27.8 LEFT ADRENAL MASS (HCC): Chronic | ICD-10-CM

## 2019-01-23 DIAGNOSIS — F32.4 MAJOR DEPRESSIVE DISORDER WITH SINGLE EPISODE, IN PARTIAL REMISSION (HCC): ICD-10-CM

## 2019-01-23 DIAGNOSIS — Z78.0 POST-MENOPAUSAL: ICD-10-CM

## 2019-01-23 DIAGNOSIS — G89.29 CHRONIC MIDLINE LOW BACK PAIN WITHOUT SCIATICA: ICD-10-CM

## 2019-01-23 PROCEDURE — 1123F ACP DISCUSS/DSCN MKR DOCD: CPT | Performed by: NURSE PRACTITIONER

## 2019-01-23 PROCEDURE — 4040F PNEUMOC VAC/ADMIN/RCVD: CPT | Performed by: NURSE PRACTITIONER

## 2019-01-23 PROCEDURE — G8427 DOCREV CUR MEDS BY ELIG CLIN: HCPCS | Performed by: NURSE PRACTITIONER

## 2019-01-23 PROCEDURE — G8510 SCR DEP NEG, NO PLAN REQD: HCPCS | Performed by: NURSE PRACTITIONER

## 2019-01-23 PROCEDURE — 99214 OFFICE O/P EST MOD 30 MIN: CPT | Performed by: NURSE PRACTITIONER

## 2019-01-23 PROCEDURE — G8400 PT W/DXA NO RESULTS DOC: HCPCS | Performed by: NURSE PRACTITIONER

## 2019-01-23 PROCEDURE — G8926 SPIRO NO PERF OR DOC: HCPCS | Performed by: NURSE PRACTITIONER

## 2019-01-23 PROCEDURE — 1036F TOBACCO NON-USER: CPT | Performed by: NURSE PRACTITIONER

## 2019-01-23 PROCEDURE — G8417 CALC BMI ABV UP PARAM F/U: HCPCS | Performed by: NURSE PRACTITIONER

## 2019-01-23 PROCEDURE — 1090F PRES/ABSN URINE INCON ASSESS: CPT | Performed by: NURSE PRACTITIONER

## 2019-01-23 PROCEDURE — 3023F SPIROM DOC REV: CPT | Performed by: NURSE PRACTITIONER

## 2019-01-23 PROCEDURE — 1101F PT FALLS ASSESS-DOCD LE1/YR: CPT | Performed by: NURSE PRACTITIONER

## 2019-01-23 PROCEDURE — G8482 FLU IMMUNIZE ORDER/ADMIN: HCPCS | Performed by: NURSE PRACTITIONER

## 2019-01-23 PROCEDURE — 3017F COLORECTAL CA SCREEN DOC REV: CPT | Performed by: NURSE PRACTITIONER

## 2019-01-23 RX ORDER — TIZANIDINE 4 MG/1
4 TABLET ORAL EVERY 8 HOURS PRN
Qty: 40 TABLET | Refills: 0 | Status: SHIPPED | OUTPATIENT
Start: 2019-01-23 | End: 2021-07-08 | Stop reason: SDUPTHER

## 2019-01-23 RX ORDER — TRAMADOL HYDROCHLORIDE 50 MG/1
50 TABLET ORAL EVERY 6 HOURS PRN
Qty: 120 TABLET | Refills: 1 | Status: SHIPPED | OUTPATIENT
Start: 2019-01-23 | End: 2019-04-10 | Stop reason: SDUPTHER

## 2019-01-23 RX ORDER — BUPROPION HYDROCHLORIDE 150 MG/1
150 TABLET ORAL EVERY MORNING
Qty: 30 TABLET | Refills: 1 | Status: SHIPPED | OUTPATIENT
Start: 2019-01-23 | End: 2019-05-07 | Stop reason: ALTCHOICE

## 2019-01-23 ASSESSMENT — ENCOUNTER SYMPTOMS
DIARRHEA: 0
RESPIRATORY NEGATIVE: 1
SHORTNESS OF BREATH: 0
GASTROINTESTINAL NEGATIVE: 1
BACK PAIN: 1
VOMITING: 0
WHEEZING: 0
COUGH: 0

## 2019-01-23 ASSESSMENT — PATIENT HEALTH QUESTIONNAIRE - PHQ9
2. FEELING DOWN, DEPRESSED OR HOPELESS: 1
SUM OF ALL RESPONSES TO PHQ QUESTIONS 1-9: 2
SUM OF ALL RESPONSES TO PHQ QUESTIONS 1-9: 2
1. LITTLE INTEREST OR PLEASURE IN DOING THINGS: 1
SUM OF ALL RESPONSES TO PHQ9 QUESTIONS 1 & 2: 2

## 2019-01-24 ENCOUNTER — HOSPITAL ENCOUNTER (OUTPATIENT)
Dept: PHYSICAL THERAPY | Age: 69
Setting detail: THERAPIES SERIES
Discharge: HOME | End: 2019-01-24
Payer: MEDICARE

## 2019-01-24 PROCEDURE — 97110 THERAPEUTIC EXERCISES: CPT

## 2019-01-24 PROCEDURE — 97162 PT EVAL MOD COMPLEX 30 MIN: CPT

## 2019-01-24 ASSESSMENT — PAIN SCALES - GENERAL: PAINLEVEL_OUTOF10: 8

## 2019-01-25 ENCOUNTER — HOSPITAL ENCOUNTER (OUTPATIENT)
Dept: GENERAL RADIOLOGY | Age: 69
Discharge: HOME | End: 2019-01-25
Payer: MEDICARE

## 2019-01-25 ENCOUNTER — HOSPITAL ENCOUNTER (OUTPATIENT)
Age: 69
Discharge: HOME | End: 2019-01-25
Payer: MEDICARE

## 2019-01-25 DIAGNOSIS — M54.50 LOW BACK PAIN RADIATING TO LEFT LOWER EXTREMITY: ICD-10-CM

## 2019-01-25 DIAGNOSIS — M79.605 LOW BACK PAIN RADIATING TO LEFT LOWER EXTREMITY: ICD-10-CM

## 2019-01-25 PROCEDURE — 72100 X-RAY EXAM L-S SPINE 2/3 VWS: CPT

## 2019-01-28 ENCOUNTER — HOSPITAL ENCOUNTER (OUTPATIENT)
Dept: PHYSICAL THERAPY | Age: 69
Setting detail: THERAPIES SERIES
Discharge: HOME | End: 2019-01-28
Payer: MEDICARE

## 2019-01-28 PROCEDURE — 97110 THERAPEUTIC EXERCISES: CPT

## 2019-02-04 ENCOUNTER — HOSPITAL ENCOUNTER (OUTPATIENT)
Dept: PHYSICAL THERAPY | Age: 69
Setting detail: THERAPIES SERIES
Discharge: HOME | End: 2019-02-04
Payer: MEDICARE

## 2019-02-04 PROCEDURE — 97110 THERAPEUTIC EXERCISES: CPT

## 2019-02-06 ENCOUNTER — HOSPITAL ENCOUNTER (OUTPATIENT)
Dept: PHYSICAL THERAPY | Age: 69
Setting detail: THERAPIES SERIES
Discharge: HOME | End: 2019-02-06
Payer: MEDICARE

## 2019-02-06 PROCEDURE — 97110 THERAPEUTIC EXERCISES: CPT

## 2019-02-06 PROCEDURE — 97112 NEUROMUSCULAR REEDUCATION: CPT

## 2019-02-11 ENCOUNTER — HOSPITAL ENCOUNTER (OUTPATIENT)
Dept: PHYSICAL THERAPY | Age: 69
Discharge: HOME | End: 2019-02-11

## 2019-02-11 NOTE — FLOWSHEET NOTE
Physical Therapy  Cancellation/No-show Note  Patient Name:  Lucila Baca  :  1950   Date:  2019  Cancelled visits to date: 1  No-shows to date: 1    For today's appointment patient:  [x]  Cancelled  []  Rescheduled appointment  []  No-show     Reason given by patient:  []  Patient ill  []  Conflicting appointment  []  No transportation    []  Conflict with work  []  No reason given  []  Other:     Comments:   Patient cancelled due to the weather.     Electronically signed by:  Marilin Martinez PTA

## 2019-02-13 ENCOUNTER — HOSPITAL ENCOUNTER (OUTPATIENT)
Dept: PHYSICAL THERAPY | Age: 69
Setting detail: THERAPIES SERIES
Discharge: HOME | End: 2019-02-13
Payer: MEDICARE

## 2019-02-13 PROCEDURE — 97110 THERAPEUTIC EXERCISES: CPT

## 2019-02-18 ENCOUNTER — HOSPITAL ENCOUNTER (OUTPATIENT)
Dept: PHYSICAL THERAPY | Age: 69
Discharge: HOME | End: 2019-02-18

## 2019-02-18 NOTE — FLOWSHEET NOTE
Physical Therapy  Cancellation/No-show Note  Patient Name:  Letty Granados  :  1950   Date:  2019  Cancelled visits to date: 2  No-shows to date: 1    For today's appointment patient:  [x]  Cancelled  []  Rescheduled appointment  []  No-show     Reason given by patient:  []  Patient ill  []  Conflicting appointment  []  No transportation    []  Conflict with work  []  No reason given  []  Other:     Comments:   Patient cancelled due to no one to watch her grandchildren.     Electronically signed by:  Tish Sim PTA

## 2019-02-20 ENCOUNTER — HOSPITAL ENCOUNTER (OUTPATIENT)
Dept: PHYSICAL THERAPY | Age: 69
Discharge: HOME | End: 2019-02-20

## 2019-02-20 NOTE — FLOWSHEET NOTE
Physical Therapy  Cancellation/No-show Note  Patient Name:  Skylar Antonio  :  1950   Date:  2019  Cancelled visits to date: 3  No-shows to date: 1    For today's appointment patient:  [x]  Cancelled  []  Rescheduled appointment  []  No-show     Reason given by patient:  []  Patient ill  []  Conflicting appointment  []  No transportation    []  Conflict with work  []  No reason given  []  Other:     Comments:   weather    Electronically signed by:  Fernando Wren PT

## 2019-02-22 ENCOUNTER — HOSPITAL ENCOUNTER (OUTPATIENT)
Dept: PHYSICAL THERAPY | Age: 69
Setting detail: THERAPIES SERIES
Discharge: HOME | End: 2019-02-22
Payer: MEDICARE

## 2019-02-22 PROCEDURE — 97140 MANUAL THERAPY 1/> REGIONS: CPT

## 2019-02-22 PROCEDURE — 97110 THERAPEUTIC EXERCISES: CPT

## 2019-02-26 DIAGNOSIS — F51.04 PSYCHOPHYSIOLOGICAL INSOMNIA: ICD-10-CM

## 2019-02-27 RX ORDER — TRAZODONE HYDROCHLORIDE 50 MG/1
50 TABLET ORAL NIGHTLY
Qty: 30 TABLET | Refills: 5 | Status: SHIPPED | OUTPATIENT
Start: 2019-02-27 | End: 2019-09-10 | Stop reason: SDUPTHER

## 2019-02-28 ENCOUNTER — HOSPITAL ENCOUNTER (OUTPATIENT)
Dept: PHYSICAL THERAPY | Age: 69
Setting detail: THERAPIES SERIES
Discharge: HOME | End: 2019-02-28
Payer: MEDICARE

## 2019-02-28 PROCEDURE — 97110 THERAPEUTIC EXERCISES: CPT

## 2019-02-28 PROCEDURE — 97140 MANUAL THERAPY 1/> REGIONS: CPT

## 2019-04-05 ENCOUNTER — TELEPHONE (OUTPATIENT)
Dept: FAMILY MEDICINE CLINIC | Age: 69
End: 2019-04-05

## 2019-04-10 ENCOUNTER — OFFICE VISIT (OUTPATIENT)
Dept: FAMILY MEDICINE CLINIC | Age: 69
End: 2019-04-10
Payer: MEDICARE

## 2019-04-10 VITALS
OXYGEN SATURATION: 95 % | BODY MASS INDEX: 28.3 KG/M2 | SYSTOLIC BLOOD PRESSURE: 124 MMHG | TEMPERATURE: 97.8 F | RESPIRATION RATE: 20 BRPM | WEIGHT: 149.8 LBS | HEART RATE: 76 BPM | DIASTOLIC BLOOD PRESSURE: 62 MMHG

## 2019-04-10 DIAGNOSIS — M54.50 CHRONIC MIDLINE LOW BACK PAIN WITHOUT SCIATICA: ICD-10-CM

## 2019-04-10 DIAGNOSIS — J44.1 COPD EXACERBATION (HCC): ICD-10-CM

## 2019-04-10 DIAGNOSIS — G89.29 CHRONIC MIDLINE LOW BACK PAIN WITHOUT SCIATICA: ICD-10-CM

## 2019-04-10 PROCEDURE — 3017F COLORECTAL CA SCREEN DOC REV: CPT | Performed by: NURSE PRACTITIONER

## 2019-04-10 PROCEDURE — G8926 SPIRO NO PERF OR DOC: HCPCS | Performed by: NURSE PRACTITIONER

## 2019-04-10 PROCEDURE — 1036F TOBACCO NON-USER: CPT | Performed by: NURSE PRACTITIONER

## 2019-04-10 PROCEDURE — 1090F PRES/ABSN URINE INCON ASSESS: CPT | Performed by: NURSE PRACTITIONER

## 2019-04-10 PROCEDURE — 99213 OFFICE O/P EST LOW 20 MIN: CPT | Performed by: NURSE PRACTITIONER

## 2019-04-10 PROCEDURE — 4040F PNEUMOC VAC/ADMIN/RCVD: CPT | Performed by: NURSE PRACTITIONER

## 2019-04-10 PROCEDURE — G8400 PT W/DXA NO RESULTS DOC: HCPCS | Performed by: NURSE PRACTITIONER

## 2019-04-10 PROCEDURE — 1123F ACP DISCUSS/DSCN MKR DOCD: CPT | Performed by: NURSE PRACTITIONER

## 2019-04-10 PROCEDURE — 3023F SPIROM DOC REV: CPT | Performed by: NURSE PRACTITIONER

## 2019-04-10 PROCEDURE — G8427 DOCREV CUR MEDS BY ELIG CLIN: HCPCS | Performed by: NURSE PRACTITIONER

## 2019-04-10 PROCEDURE — G8417 CALC BMI ABV UP PARAM F/U: HCPCS | Performed by: NURSE PRACTITIONER

## 2019-04-10 RX ORDER — PREDNISONE 10 MG/1
TABLET ORAL
Qty: 55 TABLET | Refills: 0 | Status: SHIPPED | OUTPATIENT
Start: 2019-04-10 | End: 2019-05-07 | Stop reason: ALTCHOICE

## 2019-04-10 RX ORDER — LEVOFLOXACIN 500 MG/1
500 TABLET, FILM COATED ORAL DAILY
Qty: 7 TABLET | Refills: 0 | Status: SHIPPED | OUTPATIENT
Start: 2019-04-10 | End: 2019-05-07 | Stop reason: ALTCHOICE

## 2019-04-10 RX ORDER — TRAMADOL HYDROCHLORIDE 50 MG/1
50 TABLET ORAL EVERY 6 HOURS PRN
Qty: 120 TABLET | Refills: 0 | Status: SHIPPED | OUTPATIENT
Start: 2019-04-10 | End: 2019-05-07 | Stop reason: SDUPTHER

## 2019-04-10 RX ORDER — BENZONATATE 100 MG/1
100 CAPSULE ORAL 3 TIMES DAILY PRN
Qty: 60 CAPSULE | Refills: 0 | Status: SHIPPED | OUTPATIENT
Start: 2019-04-10 | End: 2019-04-17

## 2019-04-10 ASSESSMENT — ENCOUNTER SYMPTOMS
NAUSEA: 0
SHORTNESS OF BREATH: 1
ABDOMINAL PAIN: 0
COUGH: 1
DIARRHEA: 0
EYES NEGATIVE: 1
SINUS PRESSURE: 0
WHEEZING: 1
VOMITING: 0
SORE THROAT: 0
GASTROINTESTINAL NEGATIVE: 1

## 2019-04-10 NOTE — PROGRESS NOTES
SUBJECTIVE:    Tino Gonsalves  1950  71 y.o.  female      Chief Complaint   Patient presents with    Cough     Pt c/o horrible cough and can't sleep at night because she can't lay down. Pt states she would like what they gave her before such as virtussin or tessalon perles    Wheezing     Pt states she can hear some wheezing     HPI       COPD exacerbation (HCC)  Coughing, tight feeling in chest.   Feels like she is notgetting better. Yellow thick sputum. Was on zpack and not better. This was a month ago. Put on this by pulmonology. Coughing so forcefully she 'can't breathe'. Having more trouble at night when trying to lay down flat to sleep. Chronic midline low back pain without sciatica  Reports that her back pain is worse when it is cold outside. Has been going to therapy and this has been helping. Does need refill of her tramadol. Current Outpatient Medications on File Prior to Visit   Medication Sig Dispense Refill    umeclidinium-vilanterol (ANORO ELLIPTA) 62.5-25 MCG/INH AEPB inhaler Inhale 1 puff into the lungs daily 1 each 5    montelukast (SINGULAIR) 10 MG tablet TAKE ONE TABLET BY MOUTH ONCE DAILY 30 tablet 3    methylPREDNISolone (MEDROL, TSERING,) 4 MG tablet Use as directed 1 kit 0    traZODone (DESYREL) 50 MG tablet Take 1 tablet by mouth nightly 30 tablet 5    buPROPion (WELLBUTRIN XL) 150 MG extended release tablet Take 1 tablet by mouth every morning 30 tablet 1    tiZANidine (ZANAFLEX) 4 MG tablet Take 1 tablet by mouth every 8 hours as needed (muscle spasm) 40 tablet 0    SARA-24 100 MG extended release capsule TAKE ONE CAPSULE BY MOUTH TWICE DAILY 60 capsule 2    UNABLE TO FIND Zaki shoe- firm sole. Fit to bottom of foot.  Right foot 1 each 0    ibuprofen (ADVIL;MOTRIN) 600 MG tablet Take 1 tablet by mouth every 8 hours as needed for Pain 90 tablet 0    meloxicam (MOBIC) 15 MG tablet Take 1 tablet by mouth daily 30 tablet 0    fluticasone (FLOVENT HFA) 110 MCG/ACT rate, regular rhythm, normal heart sounds and intact distal pulses. No murmur heard. Pulmonary/Chest: Effort normal. No respiratory distress. She has wheezes. She has no rales. She exhibits no tenderness. Abdominal: Soft. There is no tenderness. Neurological: She is alert and oriented to person, place, and time. Skin: Skin is warm and dry. Capillary refill takes less than 2 seconds. Psychiatric: She has a normal mood and affect. Judgment normal.   Vitals reviewed. ASSESSMENT/PLAN:    Problem List     Chronic midline low back pain without sciatica     Reports that her back pain is worse when it is cold outside. Has been going to therapy and this has been helping. Does need refill of her tramadol. Relevant Medications    methylPREDNISolone sodium (SOLU-MEDROL) injection 125 mg (Completed)    methylPREDNISolone sodium (SOLU-MEDROL) injection 125 mg (Completed)    predniSONE (DELTASONE) tablet 60 mg (Completed)    methylPREDNISolone sodium (SOLU-MEDROL) injection 125 mg (Completed)    methylPREDNISolone sodium (SOLU-MEDROL) injection 125 mg (Completed)    acetaminophen (TYLENOL) 325 MG tablet    methylPREDNISolone sodium (SOLU-MEDROL) injection 60 mg (Completed)    methylPREDNISolone sodium (SOLU-MEDROL) injection 60 mg (Completed)    meloxicam (MOBIC) 15 MG tablet    ibuprofen (ADVIL;MOTRIN) 600 MG tablet    tiZANidine (ZANAFLEX) 4 MG tablet    methylPREDNISolone (MEDROL, TSERING,) 4 MG tablet    predniSONE (DELTASONE) 10 MG tablet    traMADol (ULTRAM) 50 MG tablet    COPD exacerbation (HCC)     Coughing, tight feeling in chest.   Feels like she is notgetting better. Yellow thick sputum. Was on zpack and not better. This was a month ago. Put on this by pulmonology. Coughing so forcefully she 'can't breathe'. Having more trouble at night when trying to lay down flat to sleep.             Relevant Medications    ipratropium-albuterol (DUONEB) nebulizer solution 3 ampule (Completed) methylPREDNISolone sodium (SOLU-MEDROL) injection 125 mg (Completed)    ipratropium-albuterol (DUONEB) nebulizer solution 1 ampule (Completed)    methylPREDNISolone sodium (SOLU-MEDROL) injection 125 mg (Completed)    promethazine-codeine (PHENERGAN with CODEINE) 6.25-10 MG/5ML syrup 5 mL (Completed)    benzonatate (TESSALON) capsule 200 mg (Completed)    predniSONE (DELTASONE) tablet 60 mg (Completed)    ipratropium-albuterol (DUONEB) nebulizer solution 1 ampule (Completed)    methylPREDNISolone sodium (SOLU-MEDROL) injection 125 mg (Completed)    ipratropium-albuterol (DUONEB) nebulizer solution 1 ampule (Completed)    ipratropium-albuterol (DUONEB) nebulizer solution 1 ampule (Completed)    methylPREDNISolone sodium (SOLU-MEDROL) injection 125 mg (Completed)    ipratropium-albuterol (DUONEB) nebulizer solution 1 ampule (Completed)    ipratropium-albuterol (DUONEB) nebulizer solution 1 ampule (Completed)    ipratropium-albuterol (DUONEB) nebulizer solution 1 ampule (Completed)    hydrocodone-chlorpheniramine (TUSSIONEX) 10-8 MG/5ML oral suspension 5 mL (Completed)    methylPREDNISolone sodium (SOLU-MEDROL) injection 60 mg (Completed)    ipratropium-albuterol (DUONEB) nebulizer solution 1 ampule (Completed)    promethazine (PHENERGAN) injection 12.5 mg (Completed)    ipratropium-albuterol (DUONEB) nebulizer solution 1 ampule (Completed)    ipratropium-albuterol (DUONEB) nebulizer solution 1 ampule (Completed)    methylPREDNISolone sodium (SOLU-MEDROL) injection 60 mg (Completed)    ipratropium-albuterol (DUONEB) 0.5-2.5 (3) MG/3ML SOLN nebulizer solution    fluticasone (FLOVENT HFA) 110 MCG/ACT inhaler    albuterol sulfate HFA (VENTOLIN HFA) 108 (90 Base) MCG/ACT inhaler    SARA-24 100 MG extended release capsule    umeclidinium-vilanterol (ANORO ELLIPTA) 62.5-25 MCG/INH AEPB inhaler    montelukast (SINGULAIR) 10 MG tablet    methylPREDNISolone (MEDROL, TSERING,) 4 MG tablet    levofloxacin (LEVAQUIN) 500 MG tablet tablet Take 2 tablets by mouth every 4 hours as needed for Pain or Fever 120 tablet 3     No current facility-administered medications for this visit. 1. Chronic midline low back pain without sciatica  Refilled. Continue PT  - traMADol (ULTRAM) 50 MG tablet; Take 1 tablet by mouth every 6 hours as needed for Pain for up to 30 days. Dispense: 120 tablet; Refill: 0    2. COPD exacerbation ([address]  Patient was advised to use medications as listed below. FU if not resolving in 7-10 days or sooner if worsening symptoms. Call pulmonology if not improving or FU here. - levofloxacin (LEVAQUIN) 500 MG tablet; Take 1 tablet by mouth daily  Dispense: 7 tablet; Refill: 0  - benzonatate (TESSALON PERLES) 100 MG capsule; Take 1 capsule by mouth 3 times daily as needed for Cough  Dispense: 60 capsule; Refill: 0  - predniSONE (DELTASONE) 10 MG tablet; Take 10 and day 1, then 9,8,7,6,5,4,3,2,1  Dispense: 55 tablet; Refill: 0      Return if symptoms worsen or fail to improve. Controlled substance monitoring (if applicable to pt. Visit)  Attestation: The Prescription Monitoring Report for this patient was reviewed today. LAMBERT Morris CNP)  Chronic Pain Routine Monitoring: Possible medication side effects, risk of tolerance/dependence & alternative treatments discussed., No signs of potential drug abuse or diversion identified: otherwise, see note documentation LAMBERT Morris CNP)  Chronic Pain > 80 MEDD: Obtained or confirmed a written medication contract was on file.  LAMBERT Morris CNP)          (Please note that portions of this note may have beencompleted with a voice recognition program. Efforts were made to edit the dictations but occasionally words are mis-transcribed.)

## 2019-04-10 NOTE — ASSESSMENT & PLAN NOTE
Coughing, tight feeling in chest.   Feels like she is notgetting better. Yellow thick sputum. Was on zpack and not better. This was a month ago. Put on this by pulmonology. Coughing so forcefully she 'can't breathe'. Having more trouble at night when trying to lay down flat to sleep.

## 2019-05-07 ENCOUNTER — OFFICE VISIT (OUTPATIENT)
Dept: FAMILY MEDICINE CLINIC | Age: 69
End: 2019-05-07
Payer: MEDICARE

## 2019-05-07 VITALS
RESPIRATION RATE: 16 BRPM | OXYGEN SATURATION: 97 % | SYSTOLIC BLOOD PRESSURE: 126 MMHG | HEART RATE: 78 BPM | WEIGHT: 152 LBS | BODY MASS INDEX: 28.72 KG/M2 | DIASTOLIC BLOOD PRESSURE: 60 MMHG

## 2019-05-07 DIAGNOSIS — M54.50 CHRONIC MIDLINE LOW BACK PAIN WITHOUT SCIATICA: ICD-10-CM

## 2019-05-07 DIAGNOSIS — R07.9 CHEST PAIN, UNSPECIFIED TYPE: Primary | ICD-10-CM

## 2019-05-07 DIAGNOSIS — Z51.81 MEDICATION MONITORING ENCOUNTER: ICD-10-CM

## 2019-05-07 DIAGNOSIS — G89.29 CHRONIC MIDLINE LOW BACK PAIN WITHOUT SCIATICA: ICD-10-CM

## 2019-05-07 DIAGNOSIS — Z51.81 MEDICATION MONITORING ENCOUNTER: Primary | ICD-10-CM

## 2019-05-07 LAB
AMPHETAMINE SCREEN, URINE: NEGATIVE
BARBITURATE SCREEN, URINE: NEGATIVE
BENZODIAZEPINE SCREEN, URINE: NEGATIVE
BUPRENORPHINE URINE: NEGATIVE
COCAINE METABOLITE SCREEN URINE: NEGATIVE
GABAPENTIN SCREEN, URINE: NORMAL
HCT VFR BLD CALC: 42.5 % (ref 36–48)
HEMOGLOBIN: 14.1 G/DL (ref 12–16)
MCH RBC QN AUTO: 32.3 PG (ref 26–34)
MCHC RBC AUTO-ENTMCNC: 33.2 G/DL (ref 31–36)
MCV RBC AUTO: 97.3 FL (ref 80–100)
MDMA URINE: NEGATIVE
METHADONE SCREEN, URINE: NEGATIVE
METHAMPHETAMINE, URINE: NEGATIVE
OPIATE SCREEN URINE: NEGATIVE
OXYCODONE SCREEN URINE: NEGATIVE
PDW BLD-RTO: 13.9 % (ref 12.4–15.4)
PHENCYCLIDINE SCREEN URINE: NEGATIVE
PLATELET # BLD: 308 K/UL (ref 135–450)
PMV BLD AUTO: 9 FL (ref 5–10.5)
PROPOXYPHENE SCREEN, URINE: NORMAL
RBC # BLD: 4.37 M/UL (ref 4–5.2)
THC SCREEN, URINE: NEGATIVE
TRICYCLIC ANTIDEPRESSANTS, UR: NORMAL
WBC # BLD: 8 K/UL (ref 4–11)

## 2019-05-07 PROCEDURE — 99214 OFFICE O/P EST MOD 30 MIN: CPT | Performed by: NURSE PRACTITIONER

## 2019-05-07 PROCEDURE — G8417 CALC BMI ABV UP PARAM F/U: HCPCS | Performed by: NURSE PRACTITIONER

## 2019-05-07 PROCEDURE — G8400 PT W/DXA NO RESULTS DOC: HCPCS | Performed by: NURSE PRACTITIONER

## 2019-05-07 PROCEDURE — 1090F PRES/ABSN URINE INCON ASSESS: CPT | Performed by: NURSE PRACTITIONER

## 2019-05-07 PROCEDURE — 1123F ACP DISCUSS/DSCN MKR DOCD: CPT | Performed by: NURSE PRACTITIONER

## 2019-05-07 PROCEDURE — 1036F TOBACCO NON-USER: CPT | Performed by: NURSE PRACTITIONER

## 2019-05-07 PROCEDURE — 93000 ELECTROCARDIOGRAM COMPLETE: CPT | Performed by: NURSE PRACTITIONER

## 2019-05-07 PROCEDURE — 80305 DRUG TEST PRSMV DIR OPT OBS: CPT | Performed by: NURSE PRACTITIONER

## 2019-05-07 PROCEDURE — 3017F COLORECTAL CA SCREEN DOC REV: CPT | Performed by: NURSE PRACTITIONER

## 2019-05-07 PROCEDURE — 4040F PNEUMOC VAC/ADMIN/RCVD: CPT | Performed by: NURSE PRACTITIONER

## 2019-05-07 PROCEDURE — G8427 DOCREV CUR MEDS BY ELIG CLIN: HCPCS | Performed by: NURSE PRACTITIONER

## 2019-05-07 RX ORDER — HYDROXYZINE HYDROCHLORIDE 10 MG/1
10 TABLET, FILM COATED ORAL EVERY 8 HOURS PRN
Qty: 60 TABLET | Refills: 0 | Status: SHIPPED | OUTPATIENT
Start: 2019-05-07 | End: 2019-06-06

## 2019-05-07 RX ORDER — TRAMADOL HYDROCHLORIDE 50 MG/1
50 TABLET ORAL EVERY 8 HOURS PRN
Qty: 90 TABLET | Refills: 0 | Status: SHIPPED | OUTPATIENT
Start: 2019-05-07 | End: 2019-06-06

## 2019-05-07 ASSESSMENT — ENCOUNTER SYMPTOMS
ORTHOPNEA: 0
SHORTNESS OF BREATH: 0
SPUTUM PRODUCTION: 0
COUGH: 0
NAUSEA: 0
VOMITING: 0
HEMOPTYSIS: 0

## 2019-05-07 NOTE — LETTER
disease. Overdose or dangerous interactions with alcohol and other medications may occur, leading to death. Hyperalgesia may develop, in which patients receiving opioids for the treatment of pain may actually become more sensitive to certain painful stimuli, and in some cases, experience pain from ordinarily non-painful stimuli. Women between the ages of 14-53 who could become pregnant should carefully weigh the risks and benefits of opioids with their physicians, as these medications increase the risk of pregnancy complications, including miscarriage,  delivery and stillbirth. It is also possible for babies to be born addicted to opioids. Opioid dependence withdrawal symptoms may include; feelings of uneasiness, increased pain, irritability, belly pain, diarrhea, sweats and goose-flesh. Benzodiazepines and non-benzodiazepine sleep medications: These medications can lead to problems such as addiction/dependence, sedation, fatigue, lightheadedness, dizziness, incoordination, falls, depression, hallucinations, and impaired judgment, memory and concentration. The ability to drive and operate machinery may also be affected. Abnormal sleep-related behaviors have been reported, including sleep walking, driving, making telephone calls, eating, or having sex while not fully awake. These medications can suppress breathing and worsen sleep apnea, particularly when combined with alcohol or other sedating medications, potentially leading to death. Dependence withdrawal symptoms may include tremors, anxiety, hallucinations and seizures. Stimulants:  Common adverse effects include addiction/dependence, increased blood pressure and heart rate, decreased appetite, nausea, involuntary weight loss, insomnia, irritability, and headaches.   These risks may increase when these medications are combined with other stimulants, such as caffeine pills or energy drinks, certain weight loss supplements and oral decongestants. Dependence withdrawal symptoms may include depressed mood, loss of interest, suicidal thoughts, anxiety, fatigue, appetite changes and agitation. Testosterone replacement therapy:  Potential side effects include increased risk of stroke and heart attack, blood clots, increased blood pressure, increased cholesterol, enlarged prostate, sleep apnea, irritability/aggression and other mood disorders, and decreased fertility. Other:     1. I understand that I have the following responsibilities:  · I will take medications at the dose and frequency prescribed. · I will not increase or change how I take my medications without the approval of the health care provider who signs this Medication Agreement. · I will arrange for refills at the prescribed interval ONLY during regular office hours. I will not ask for refills earlier than agreed, after-hours, on holidays or on weekends. · I will obtain all refills for these medications at  ·  ____________________________________  pharmacy (phone number  ·  ________________________), with full consent for my provider and pharmacist to exchange information in writing or verbally. · I will not request any pain medications or controlled substances from other providers and will inform this provider of all other medications I am taking. · I will inform my other health care providers that I am taking these medications and of the existence of this [facility]. In the event of an emergency, I will provide the same information to the emergency department providers. · I will protect my prescriptions and medications. I understand that lost or misplaced prescriptions will not be replaced. · I will keep medications only for my own use and will not share them with others. I will keep all medications away from children. · I agree to participate in any medical, psychological or psychiatric assessments recommended by my provider. · I will actively participate in any program designed to improve function, including social, physical, psychological and daily or work activities. 2. I will not use illegal or street drugs or another person's prescription. If I have an addiction problem with drugs or alcohol and my provider asks me to enter a program to address this issue, I agree to follow through. Such programs may include:  · 12-Step program and securing a sponsor  · Individual counseling   · Inpatient or outpatient treatment  · Other:_____________________________________________________________________________________________________________________________________________    If in treatment, I will request that a copy of the programs initial evaluation and treatment recommendations be sent to this provider and will not expect refills until that is received. I will also request written monthly updates be sent to this provider to verify my continuing treatment. 3. I will consent to drug screening upon my providers request to assure I am only taking the prescribed drugs, described in this MEDICATION AGREEMENT. I understand that a drug screen is a laboratory test in which a sample of my urine, blood or saliva is checked to see what drugs I have been taking. 4. I agree that I will treat the providers and staff at this office with respect at all times. I will keep all of my scheduled appointments, but if I need to cancel my appointment, I will do so a minimum of 24 hours before it is scheduled. 5. I understand that this provider may stop prescribing the medications listed if:  · I do not show any improvement in pain, or my activity has not improved. · I develop rapid tolerance or loss of improvement, as described in my treatment plan. · I develop significant side effects from the medication.   · My behavior is inconsistent with the responsibilities outlined above, which may also result in my being prevented from receiving further care from this office. · Other:____________________________________________________________________    AGREEMENT:    I have read the above and have had all of my questions answered. For chronic disease management, I know that my symptoms can be managed with many types of treatments. A chronic medication trial may be part of my treatment, but I must be an active participant in my care. Medication therapy is only one part of my symptom management plan. In some cases, there may be limited scientific evidence to support the chronic use of certain medications to improve symptoms and daily function. Furthermore, in certain circumstances, there may be scientific information that suggests that use of chronic controlled substances may actually worsen my symptoms and increase my risk of unintentional death directly related to this medication therapy. I know that if my provider feels my risk from controlled medications is greater than my benefit, I will have my controlled substance medication(s) compassionately lowered or removed altogether. I agree to a controlled substance medication trial.      I further agree to allow this office to contact my HIPAA contact on file if there are concerns about my safety and use of controlled medications. I have agreed to use the following medications above as instructed by my physician and as stated in this [facility].      Patient Signature:  ______________________  Date:5/7/2019 or _____________    Provider Signature:_  Date:5/7/2019 or

## 2019-05-07 NOTE — PROGRESS NOTES
SUBJECTIVE:    Mary Perera  1950  71 y.o.  female      Chief Complaint   Patient presents with    Chest Pain     Pt states pain in the back that moves around to the front.  Arm Pain     Pt c/o achy feeling in left arm     Having heart burn at night for the past 1 week      Chest Pain    This is a new problem. The current episode started in the past 7 days. The problem occurs intermittently. The problem has been unchanged. The pain is present in the substernal region and lateral region. The pain is at a severity of 6/10. The pain is moderate. The quality of the pain is described as stabbing, dull, pressure and squeezing. The pain radiates to the mid back, left shoulder and left arm. Associated symptoms include PND. Pertinent negatives include no cough, diaphoresis, dizziness, exertional chest pressure, fever, hemoptysis, leg pain, lower extremity edema, malaise/fatigue, nausea, near-syncope ('heart burn), numbness, orthopnea, palpitations, shortness of breath, sputum production, syncope, vomiting ('heart burn') or weakness. The pain is aggravated by nothing. She has tried antacids for the symptoms. The treatment provided no relief. Arm Pain    Associated symptoms include chest pain. Pertinent negatives include no numbness. No problem-specific Assessment & Plan notes found for this encounter.       Current Outpatient Medications on File Prior to Visit   Medication Sig Dispense Refill    umeclidinium-vilanterol (ANORO ELLIPTA) 62.5-25 MCG/INH AEPB inhaler Inhale 1 puff into the lungs daily 1 each 5    montelukast (SINGULAIR) 10 MG tablet TAKE ONE TABLET BY MOUTH ONCE DAILY 30 tablet 3    traZODone (DESYREL) 50 MG tablet Take 1 tablet by mouth nightly 30 tablet 5    tiZANidine (ZANAFLEX) 4 MG tablet Take 1 tablet by mouth every 8 hours as needed (muscle spasm) 40 tablet 0    SARA-24 100 MG extended release capsule TAKE ONE CAPSULE BY MOUTH TWICE DAILY 60 capsule 2    UNABLE TO FIND Zaki shoe- firm sole. Fit to bottom of foot. Right foot 1 each 0    ibuprofen (ADVIL;MOTRIN) 600 MG tablet Take 1 tablet by mouth every 8 hours as needed for Pain 90 tablet 0    fluticasone (FLOVENT HFA) 110 MCG/ACT inhaler Inhale 2 puffs into the lungs 2 times daily 1 Inhaler 5    albuterol sulfate HFA (VENTOLIN HFA) 108 (90 Base) MCG/ACT inhaler Inhale 2 puffs into the lungs every 6 hours as needed for Wheezing 1 Inhaler 5    ipratropium-albuterol (DUONEB) 0.5-2.5 (3) MG/3ML SOLN nebulizer solution Take 3 mLs by nebulization 4 times daily 360 mL 0    acetaminophen (TYLENOL) 325 MG tablet Take 2 tablets by mouth every 4 hours as needed for Pain or Fever 120 tablet 3     No current facility-administered medications on file prior to visit. Review of PMH, PSH, Family Hx, Allergies and updates made as needed. PHQ Scores 1/23/2019 10/31/2018 3/6/2018   PHQ2 Score 2 2 0   PHQ9 Score 2 2 0     Interpretation of Total Score Depression Severity: 1-4 = Minimal depression, 5-9 = Mild depression, 10-14 = Moderate depression, 15-19 = Moderately severe depression, 20-27 = Severe depression    Review of Systems   Constitutional: Negative for diaphoresis, fever and malaise/fatigue. Respiratory: Negative for cough, hemoptysis, sputum production and shortness of breath. Cardiovascular: Positive for chest pain and PND. Negative for palpitations, orthopnea, syncope and near-syncope ('heart burn). Gastrointestinal: Negative for nausea and vomiting ('heart burn'). Neurological: Negative for dizziness, weakness and numbness. OBJECTIVE:    /60 (Site: Right Upper Arm, Position: Sitting, Cuff Size: Large Adult)   Pulse 78   Resp 16   Wt 152 lb (68.9 kg)   SpO2 97%   BMI 28.72 kg/m²     Physical Exam   Constitutional: She is oriented to person, place, and time. She appears well-developed and well-nourished. No distress. HENT:   Head: Normocephalic and atraumatic.    Eyes: Pupils are equal, round, and reactive 10 MG tablet TAKE ONE TABLET BY MOUTH ONCE DAILY 30 tablet 3    traZODone (DESYREL) 50 MG tablet Take 1 tablet by mouth nightly 30 tablet 5    tiZANidine (ZANAFLEX) 4 MG tablet Take 1 tablet by mouth every 8 hours as needed (muscle spasm) 40 tablet 0    SARA-24 100 MG extended release capsule TAKE ONE CAPSULE BY MOUTH TWICE DAILY 60 capsule 2    UNABLE TO FIND Zaki shoe- firm sole. Fit to bottom of foot. Right foot 1 each 0    ibuprofen (ADVIL;MOTRIN) 600 MG tablet Take 1 tablet by mouth every 8 hours as needed for Pain 90 tablet 0    fluticasone (FLOVENT HFA) 110 MCG/ACT inhaler Inhale 2 puffs into the lungs 2 times daily 1 Inhaler 5    albuterol sulfate HFA (VENTOLIN HFA) 108 (90 Base) MCG/ACT inhaler Inhale 2 puffs into the lungs every 6 hours as needed for Wheezing 1 Inhaler 5    ipratropium-albuterol (DUONEB) 0.5-2.5 (3) MG/3ML SOLN nebulizer solution Take 3 mLs by nebulization 4 times daily 360 mL 0    acetaminophen (TYLENOL) 325 MG tablet Take 2 tablets by mouth every 4 hours as needed for Pain or Fever 120 tablet 3     No current facility-administered medications for this visit. 1. Chest pain, unspecified type  Labs today, stress test.   If pain not relieved by rest- ER    - EKG 12 lead  - CBC  - (Gxt) Stress Test Exercise W Out Myoview; Future  - TROPONIN  - COMPREHENSIVE METABOLIC PANEL  - C-REACTIVE PROTEIN      Return in about 1 month (around 6/7/2019) for chest pain, med monitoring. Controlled substance monitoring (if applicable to pt.  Visit)             (Please note that portions of this note may have beencompleted with a voice recognition program. Efforts were made to edit the dictations but occasionally words are mis-transcribed.)

## 2019-05-08 LAB
A/G RATIO: 1.7 (ref 1.1–2.2)
ALBUMIN SERPL-MCNC: 4.3 G/DL (ref 3.4–5)
ALP BLD-CCNC: 103 U/L (ref 40–129)
ALT SERPL-CCNC: 11 U/L (ref 10–40)
ANION GAP SERPL CALCULATED.3IONS-SCNC: 15 MMOL/L (ref 3–16)
AST SERPL-CCNC: 17 U/L (ref 15–37)
BILIRUB SERPL-MCNC: <0.2 MG/DL (ref 0–1)
BUN BLDV-MCNC: 11 MG/DL (ref 7–20)
C-REACTIVE PROTEIN: 1 MG/L (ref 0–5.1)
CALCIUM SERPL-MCNC: 9.2 MG/DL (ref 8.3–10.6)
CHLORIDE BLD-SCNC: 105 MMOL/L (ref 99–110)
CO2: 22 MMOL/L (ref 21–32)
CREAT SERPL-MCNC: <0.5 MG/DL (ref 0.6–1.2)
GFR AFRICAN AMERICAN: >60
GFR NON-AFRICAN AMERICAN: >60
GLOBULIN: 2.6 G/DL
GLUCOSE BLD-MCNC: 76 MG/DL (ref 70–99)
POTASSIUM SERPL-SCNC: 4.7 MMOL/L (ref 3.5–5.1)
SODIUM BLD-SCNC: 142 MMOL/L (ref 136–145)
TOTAL PROTEIN: 6.9 G/DL (ref 6.4–8.2)
TROPONIN: <0.01 NG/ML

## 2019-05-11 LAB
6-ACETYLMORPHINE: NOT DETECTED
7-AMINOCLONAZEPAM: NOT DETECTED
ALPHA-OH-ALPRAZOLAM: NOT DETECTED
ALPRAZOLAM: NOT DETECTED
AMPHETAMINE: NOT DETECTED
BARBITURATES: NOT DETECTED
BENZOYLECGONINE: NOT DETECTED
BUPRENORPHINE: NOT DETECTED
CARISOPRODOL: NOT DETECTED
CLONAZEPAM: NOT DETECTED
CODEINE: NOT DETECTED
CREATININE URINE: <20 MG/DL (ref 20–400)
DIAZEPAM: NOT DETECTED
DRUGS EXPECTED: ABNORMAL
EER PAIN MGT DRUG PANEL, HIGH RES/EMIT U: ABNORMAL
ETHYL GLUCURONIDE: NOT DETECTED
FENTANYL: NOT DETECTED
HYDROCODONE: NOT DETECTED
HYDROMORPHONE: NOT DETECTED
LORAZEPAM: NOT DETECTED
MARIJUANA METABOLITE: NOT DETECTED
MDA: NOT DETECTED
MDEA: NOT DETECTED
MDMA URINE: NOT DETECTED
MEPERIDINE: NOT DETECTED
METHADONE: NOT DETECTED
METHAMPHETAMINE: NOT DETECTED
METHYLPHENIDATE: NOT DETECTED
MIDAZOLAM: NOT DETECTED
MORPHINE: NOT DETECTED
NORBUPRENORPHINE, FREE: NOT DETECTED
NORDIAZEPAM: NOT DETECTED
NORFENTANYL: NOT DETECTED
NORHYDROCODONE, URINE: NOT DETECTED
NOROXYCODONE: NOT DETECTED
NOROXYMORPHONE, URINE: NOT DETECTED
OXAZEPAM: NOT DETECTED
OXYCODONE: NOT DETECTED
OXYMORPHONE: NOT DETECTED
PAIN MANAGEMENT DRUG PANEL: ABNORMAL
PAIN MANAGEMENT DRUG PANEL: ABNORMAL
PCP: NOT DETECTED
PHENTERMINE: NOT DETECTED
PROPOXYPHENE: NOT DETECTED
TAPENTADOL, URINE: NOT DETECTED
TAPENTADOL-O-SULFATE, URINE: NOT DETECTED
TEMAZEPAM: NOT DETECTED
TRAMADOL: NOT DETECTED
ZOLPIDEM: NOT DETECTED

## 2019-05-23 ENCOUNTER — APPOINTMENT (OUTPATIENT)
Dept: NUCLEAR MEDICINE | Age: 69
End: 2019-05-23
Payer: MEDICARE

## 2019-05-23 ENCOUNTER — HOSPITAL ENCOUNTER (OUTPATIENT)
Dept: NON INVASIVE DIAGNOSTICS | Age: 69
Discharge: HOME | End: 2019-05-23
Payer: MEDICARE

## 2019-05-23 DIAGNOSIS — R07.9 CHEST PAIN, UNSPECIFIED TYPE: ICD-10-CM

## 2019-05-23 PROCEDURE — 93017 CV STRESS TEST TRACING ONLY: CPT

## 2019-06-07 ENCOUNTER — OFFICE VISIT (OUTPATIENT)
Dept: FAMILY MEDICINE CLINIC | Age: 69
End: 2019-06-07
Payer: MEDICARE

## 2019-06-07 VITALS
DIASTOLIC BLOOD PRESSURE: 62 MMHG | WEIGHT: 147.2 LBS | RESPIRATION RATE: 16 BRPM | SYSTOLIC BLOOD PRESSURE: 120 MMHG | HEART RATE: 81 BPM | BODY MASS INDEX: 27.81 KG/M2

## 2019-06-07 DIAGNOSIS — H66.011 ACUTE SUPPURATIVE OTITIS MEDIA OF RIGHT EAR WITH SPONTANEOUS RUPTURE OF TYMPANIC MEMBRANE, RECURRENCE NOT SPECIFIED: ICD-10-CM

## 2019-06-07 DIAGNOSIS — R07.9 CHEST PAIN, UNSPECIFIED TYPE: Primary | ICD-10-CM

## 2019-06-07 PROCEDURE — 1090F PRES/ABSN URINE INCON ASSESS: CPT | Performed by: NURSE PRACTITIONER

## 2019-06-07 PROCEDURE — G8400 PT W/DXA NO RESULTS DOC: HCPCS | Performed by: NURSE PRACTITIONER

## 2019-06-07 PROCEDURE — G8417 CALC BMI ABV UP PARAM F/U: HCPCS | Performed by: NURSE PRACTITIONER

## 2019-06-07 PROCEDURE — 99213 OFFICE O/P EST LOW 20 MIN: CPT | Performed by: NURSE PRACTITIONER

## 2019-06-07 PROCEDURE — G8427 DOCREV CUR MEDS BY ELIG CLIN: HCPCS | Performed by: NURSE PRACTITIONER

## 2019-06-07 PROCEDURE — 3017F COLORECTAL CA SCREEN DOC REV: CPT | Performed by: NURSE PRACTITIONER

## 2019-06-07 PROCEDURE — 4040F PNEUMOC VAC/ADMIN/RCVD: CPT | Performed by: NURSE PRACTITIONER

## 2019-06-07 PROCEDURE — 1036F TOBACCO NON-USER: CPT | Performed by: NURSE PRACTITIONER

## 2019-06-07 PROCEDURE — 1123F ACP DISCUSS/DSCN MKR DOCD: CPT | Performed by: NURSE PRACTITIONER

## 2019-06-07 RX ORDER — TRAMADOL HYDROCHLORIDE 50 MG/1
TABLET ORAL
COMMUNITY
Start: 2019-06-06 | End: 2019-09-10 | Stop reason: SDUPTHER

## 2019-06-07 RX ORDER — AMOXICILLIN 500 MG/1
500 CAPSULE ORAL 3 TIMES DAILY
Qty: 30 CAPSULE | Refills: 0 | Status: SHIPPED | OUTPATIENT
Start: 2019-06-07 | End: 2019-06-17

## 2019-06-07 ASSESSMENT — ENCOUNTER SYMPTOMS
RESPIRATORY NEGATIVE: 1
CHEST TIGHTNESS: 0
EYES NEGATIVE: 1
BACK PAIN: 1
SHORTNESS OF BREATH: 0
EYE PAIN: 0
COUGH: 0
NAUSEA: 0
ABDOMINAL PAIN: 0
GASTROINTESTINAL NEGATIVE: 1

## 2019-06-07 NOTE — LETTER
MEDICATION AGREEMENT     Mary Dilma  [birth year]      For certain conditions, multiple classes of medications may be used to help better manage your symptoms, and to improve your ability to function at home, work and in social settings. However, these medications do have risks, which will be discussed with you, including addiction and dependency. The following prescribed medications need frequent monitoring and will require you to partner and assist in your healthcare. Medication  Dose, instructions and quantity as indicated on current prescription bottle Diagnosis/Reason(s) for Taking Category     tramadol pain Narcotic                             Benefits and goals of Controlled Substance Medications: There are two potential goals for your treatment: (1) decreased pain and suffering (2) improved daily life functions. There are many possible treatments for your chronic condition(s), and, in addition to controlled substance medications, we will try alternatives such as physical therapy, yoga, massage, home daily exercise, meditation, relaxation techniques, injections, chiropractic manipulations, surgery, cognitive therapy, hypnosis and many medications that are not habit-forming. Use of controlled substance medications may be helpful, but they are unlikely to resolve all of your symptoms or restore all function. Risks of Controlled Substance Medications:    Opioid pain medications: These medications can lead to problems such as addiction/dependence, sedation, lightheadedness/dizziness, memory issues, falls, constipation, nausea, or vomiting. They may also impair the ability to drive or operate machinery. Additionally, these medications may lower testosterone levels, leading to loss of bone strength, stamina and sex drive.   They may cause problems with breathing, sleep apnea and reduced coughing, which are especially dangerous for patients with lung disease. Overdose or dangerous interactions with alcohol and other medications may occur, leading to death. Hyperalgesia may develop, in which patients receiving opioids for the treatment of pain may actually become more sensitive to certain painful stimuli, and in some cases, experience pain from ordinarily non-painful stimuli. Women between the ages of 14-53 who could become pregnant should carefully weigh the risks and benefits of opioids with their physicians, as these medications increase the risk of pregnancy complications, including miscarriage,  delivery and stillbirth. It is also possible for babies to be born addicted to opioids. Opioid dependence withdrawal symptoms may include; feelings of uneasiness, increased pain, irritability, belly pain, diarrhea, sweats and goose-flesh. Benzodiazepines and non-benzodiazepine sleep medications: These medications can lead to problems such as addiction/dependence, sedation, fatigue, lightheadedness, dizziness, incoordination, falls, depression, hallucinations, and impaired judgment, memory and concentration. The ability to drive and operate machinery may also be affected. Abnormal sleep-related behaviors have been reported, including sleep walking, driving, making telephone calls, eating, or having sex while not fully awake. These medications can suppress breathing and worsen sleep apnea, particularly when combined with alcohol or other sedating medications, potentially leading to death. Dependence withdrawal symptoms may include tremors, anxiety, hallucinations and seizures. Stimulants:  Common adverse effects include addiction/dependence, increased blood pressure and heart rate, decreased appetite, nausea, involuntary weight loss, insomnia, irritability, and headaches.   These risks may increase when these medications are combined with other stimulants, such as caffeine pills or energy drinks, certain weight loss supplements and oral decongestants. Dependence withdrawal symptoms may include depressed mood, loss of interest, suicidal thoughts, anxiety, fatigue, appetite changes and agitation. Testosterone replacement therapy:  Potential side effects include increased risk of stroke and heart attack, blood clots, increased blood pressure, increased cholesterol, enlarged prostate, sleep apnea, irritability/aggression and other mood disorders, and decreased fertility. Other:     1. I understand that I have the following responsibilities:  · I will take medications at the dose and frequency prescribed. · I will not increase or change how I take my medications without the approval of the health care provider who signs this Medication Agreement. · I will arrange for refills at the prescribed interval ONLY during regular office hours. I will not ask for refills earlier than agreed, after-hours, on holidays or on weekends. · I will obtain all refills for these medications at  ·  ____________________________________  pharmacy (phone number  ·  ________________________), with full consent for my provider and pharmacist to exchange information in writing or verbally. · I will not request any pain medications or controlled substances from other providers and will inform this provider of all other medications I am taking. · I will inform my other health care providers that I am taking these medications and of the existence of this [address]. In the event of an emergency, I will provide the same information to the emergency department providers. · I will protect my prescriptions and medications. I understand that lost or misplaced prescriptions will not be replaced. · I will keep medications only for my own use and will not share them with others. I will keep all medications away from children. · I agree to participate in any medical, psychological or psychiatric assessments recommended by my provider. which may also result in my being prevented from receiving further care from this office. · Other:____________________________________________________________________    AGREEMENT:    I have read the above and have had all of my questions answered. For chronic disease management, I know that my symptoms can be managed with many types of treatments. A chronic medication trial may be part of my treatment, but I must be an active participant in my care. Medication therapy is only one part of my symptom management plan. In some cases, there may be limited scientific evidence to support the chronic use of certain medications to improve symptoms and daily function. Furthermore, in certain circumstances, there may be scientific information that suggests that use of chronic controlled substances may actually worsen my symptoms and increase my risk of unintentional death directly related to this medication therapy. I know that if my provider feels my risk from controlled medications is greater than my benefit, I will have my controlled substance medication(s) compassionately lowered or removed altogether. I agree to a controlled substance medication trial.      I further agree to allow this office to contact my HIPAA contact on file if there are concerns about my safety and use of controlled medications. I have agreed to use the following medications above as instructed by my physician and as stated in this [facility] [address].      Patient Signature:  ______________________  Date:6/7/2019 or _____________    Provider Signature:__  ____________________  Date:6/7/2019 or _____________

## 2019-06-07 NOTE — PROGRESS NOTES
1/23/2019 10/31/2018 3/6/2018   PHQ2 Score 2 2 0   PHQ9 Score 2 2 0     Interpretation of Total Score Depression Severity: 1-4 = Minimal depression, 5-9 = Mild depression, 10-14 = Moderate depression, 15-19 = Moderately severe depression, 20-27 = Severe depression    Review of Systems   Constitutional: Negative. Negative for fatigue and unexpected weight change. HENT: Positive for ear discharge and ear pain. Eyes: Negative. Negative for pain and visual disturbance. Respiratory: Negative. Negative for cough, chest tightness and shortness of breath. Cardiovascular: Positive for chest pain. Negative for palpitations and leg swelling. Gastrointestinal: Negative. Negative for abdominal pain and nausea. Endocrine: Negative. Musculoskeletal: Positive for arthralgias and back pain. Neurological: Negative. Negative for dizziness, syncope, facial asymmetry, speech difficulty, light-headedness, numbness and headaches. Psychiatric/Behavioral: Negative. All other systems reviewed and are negative. OBJECTIVE:    /62 (Site: Right Upper Arm, Position: Sitting, Cuff Size: Medium Adult)   Pulse 81   Resp 16   Wt 147 lb 3.2 oz (66.8 kg)   BMI 27.81 kg/m²     Physical Exam   Constitutional: She is oriented to person, place, and time. She appears well-developed and well-nourished. HENT:   Head: Normocephalic and atraumatic. Right ear with TM rupture, white/yellow drainage. No fever, painful to touch during exam.   Happened about 5 days ago per patient. Still drainage. Eyes: Pupils are equal, round, and reactive to light. Neck: Normal range of motion. Neck supple. Cardiovascular: Normal rate, regular rhythm, normal heart sounds and intact distal pulses. No murmur heard. Pulmonary/Chest: Effort normal and breath sounds normal. No respiratory distress. She has no wheezes. Abdominal: Soft. There is no tenderness.    Neurological: She is alert and oriented to person, place, and time.   Skin: Skin is warm and dry. Capillary refill takes less than 2 seconds. Psychiatric: She has a normal mood and affect. Her behavior is normal. Judgment and thought content normal.   Vitals reviewed. ASSESSMENT/PLAN:    Problem List     None          Current Outpatient Medications   Medication Sig Dispense Refill    amoxicillin (AMOXIL) 500 MG capsule Take 1 capsule by mouth 3 times daily for 10 days 30 capsule 0    traMADol (ULTRAM) 50 MG tablet       umeclidinium-vilanterol (ANORO ELLIPTA) 62.5-25 MCG/INH AEPB inhaler Inhale 1 puff into the lungs daily 1 each 5    montelukast (SINGULAIR) 10 MG tablet TAKE ONE TABLET BY MOUTH ONCE DAILY 30 tablet 3    traZODone (DESYREL) 50 MG tablet Take 1 tablet by mouth nightly 30 tablet 5    tiZANidine (ZANAFLEX) 4 MG tablet Take 1 tablet by mouth every 8 hours as needed (muscle spasm) 40 tablet 0    SARA-24 100 MG extended release capsule TAKE ONE CAPSULE BY MOUTH TWICE DAILY 60 capsule 2    UNABLE TO FIND Zaki shoe- firm sole. Fit to bottom of foot. Right foot 1 each 0    ibuprofen (ADVIL;MOTRIN) 600 MG tablet Take 1 tablet by mouth every 8 hours as needed for Pain 90 tablet 0    fluticasone (FLOVENT HFA) 110 MCG/ACT inhaler Inhale 2 puffs into the lungs 2 times daily 1 Inhaler 5    albuterol sulfate HFA (VENTOLIN HFA) 108 (90 Base) MCG/ACT inhaler Inhale 2 puffs into the lungs every 6 hours as needed for Wheezing 1 Inhaler 5    ipratropium-albuterol (DUONEB) 0.5-2.5 (3) MG/3ML SOLN nebulizer solution Take 3 mLs by nebulization 4 times daily 360 mL 0    acetaminophen (TYLENOL) 325 MG tablet Take 2 tablets by mouth every 4 hours as needed for Pain or Fever 120 tablet 3     No current facility-administered medications for this visit. 1. Chest pain, unspecified type  FU for eval.     - Brenda Acevedo MD, Cardiology, [facility]    2.  Acute suppurative otitis media of right ear with spontaneous rupture of tympanic membrane, recurrence not specified  Patient was advised to use medications as listed below. Can use hot tea with lemon/honey for cough and sore throat. Tylenol/ibuprofen OTC for aches or fever. FU if not resolving in 7-10 days or sooner if worsening symptoms. - amoxicillin (AMOXIL) 500 MG capsule; Take 1 capsule by mouth 3 times daily for 10 days  Dispense: 30 capsule; Refill: 0      Return if symptoms worsen or fail to improve. Controlled substance monitoring (if applicable to pt.  Visit)             (Please note that portions of this note may have beencompleted with a voice recognition program. Efforts were made to edit the dictations but occasionally words are mis-transcribed.)

## 2019-07-03 ENCOUNTER — TELEPHONE (OUTPATIENT)
Dept: FAMILY MEDICINE CLINIC | Age: 69
End: 2019-07-03

## 2019-07-03 RX ORDER — PREDNISONE 20 MG/1
40 TABLET ORAL DAILY
Qty: 10 TABLET | Refills: 0 | Status: SHIPPED | OUTPATIENT
Start: 2019-07-03 | End: 2019-07-08

## 2019-07-03 NOTE — TELEPHONE ENCOUNTER
Will send to pharmacy. Please let Erasmo Duong know that I have sent this in. If not improving in the next few days needs a FU. Thanks.

## 2019-09-10 ENCOUNTER — OFFICE VISIT (OUTPATIENT)
Dept: FAMILY MEDICINE CLINIC | Age: 69
End: 2019-09-10
Payer: MEDICARE

## 2019-09-10 VITALS
BODY MASS INDEX: 27.32 KG/M2 | SYSTOLIC BLOOD PRESSURE: 116 MMHG | HEART RATE: 80 BPM | WEIGHT: 144.6 LBS | DIASTOLIC BLOOD PRESSURE: 62 MMHG | RESPIRATION RATE: 16 BRPM

## 2019-09-10 DIAGNOSIS — J44.9 MODERATE COPD (CHRONIC OBSTRUCTIVE PULMONARY DISEASE) (HCC): Primary | ICD-10-CM

## 2019-09-10 DIAGNOSIS — F32.4 MAJOR DEPRESSIVE DISORDER WITH SINGLE EPISODE, IN PARTIAL REMISSION (HCC): ICD-10-CM

## 2019-09-10 DIAGNOSIS — M54.50 CHRONIC MIDLINE LOW BACK PAIN WITHOUT SCIATICA: ICD-10-CM

## 2019-09-10 DIAGNOSIS — Z23 FLU VACCINE NEED: ICD-10-CM

## 2019-09-10 DIAGNOSIS — G89.29 CHRONIC MIDLINE LOW BACK PAIN WITHOUT SCIATICA: ICD-10-CM

## 2019-09-10 DIAGNOSIS — F51.01 PRIMARY INSOMNIA: ICD-10-CM

## 2019-09-10 PROBLEM — F51.04 PSYCHOPHYSIOLOGICAL INSOMNIA: Status: ACTIVE | Noted: 2019-09-10

## 2019-09-10 PROCEDURE — 4040F PNEUMOC VAC/ADMIN/RCVD: CPT | Performed by: NURSE PRACTITIONER

## 2019-09-10 PROCEDURE — 1123F ACP DISCUSS/DSCN MKR DOCD: CPT | Performed by: NURSE PRACTITIONER

## 2019-09-10 PROCEDURE — G0008 ADMIN INFLUENZA VIRUS VAC: HCPCS | Performed by: NURSE PRACTITIONER

## 2019-09-10 PROCEDURE — G8400 PT W/DXA NO RESULTS DOC: HCPCS | Performed by: NURSE PRACTITIONER

## 2019-09-10 PROCEDURE — G8427 DOCREV CUR MEDS BY ELIG CLIN: HCPCS | Performed by: NURSE PRACTITIONER

## 2019-09-10 PROCEDURE — 99214 OFFICE O/P EST MOD 30 MIN: CPT | Performed by: NURSE PRACTITIONER

## 2019-09-10 PROCEDURE — 3017F COLORECTAL CA SCREEN DOC REV: CPT | Performed by: NURSE PRACTITIONER

## 2019-09-10 PROCEDURE — 1090F PRES/ABSN URINE INCON ASSESS: CPT | Performed by: NURSE PRACTITIONER

## 2019-09-10 PROCEDURE — 90686 IIV4 VACC NO PRSV 0.5 ML IM: CPT | Performed by: NURSE PRACTITIONER

## 2019-09-10 PROCEDURE — 3023F SPIROM DOC REV: CPT | Performed by: NURSE PRACTITIONER

## 2019-09-10 PROCEDURE — 1036F TOBACCO NON-USER: CPT | Performed by: NURSE PRACTITIONER

## 2019-09-10 PROCEDURE — G8926 SPIRO NO PERF OR DOC: HCPCS | Performed by: NURSE PRACTITIONER

## 2019-09-10 PROCEDURE — G8417 CALC BMI ABV UP PARAM F/U: HCPCS | Performed by: NURSE PRACTITIONER

## 2019-09-10 RX ORDER — MONTELUKAST SODIUM 10 MG/1
TABLET ORAL
Qty: 30 TABLET | Refills: 3 | Status: SHIPPED | OUTPATIENT
Start: 2019-09-10 | End: 2020-11-12

## 2019-09-10 RX ORDER — TRAZODONE HYDROCHLORIDE 100 MG/1
TABLET ORAL
Qty: 60 TABLET | Refills: 5 | Status: SHIPPED | OUTPATIENT
Start: 2019-09-10 | End: 2020-04-13 | Stop reason: SDUPTHER

## 2019-09-10 RX ORDER — TRAMADOL HYDROCHLORIDE 50 MG/1
50 TABLET ORAL EVERY 8 HOURS PRN
Qty: 90 TABLET | Refills: 2 | Status: SHIPPED | OUTPATIENT
Start: 2019-09-10 | End: 2019-12-09

## 2019-09-10 ASSESSMENT — ENCOUNTER SYMPTOMS
NAUSEA: 0
SHORTNESS OF BREATH: 0
COUGH: 0
VOMITING: 0
RESPIRATORY NEGATIVE: 1
GASTROINTESTINAL NEGATIVE: 1
BACK PAIN: 1
DIARRHEA: 0
WHEEZING: 0
CONSTIPATION: 0

## 2019-09-10 NOTE — ASSESSMENT & PLAN NOTE
Patient denies low back pain or painful or reduced range of motion of the back. States medication helping pain, using tramadol PRN  Last fill of medication was in June. She is almost out and requesting a refill.   Taking this medication as needed allows her to remain functional and provide care for her grandchildren

## 2019-09-10 NOTE — PROGRESS NOTES
SUBJECTIVE:    Yessenia Rom  1950  71 y.o.  female      Chief Complaint   Patient presents with    3 Month Follow-Up     Pt here for 3 month follow up    Medication Refill    Insomnia     Pt c/o not being able to sleep.  Flu Vaccine     HPI    Moderate COPD (chronic obstructive pulmonary disease) (HCC)  The patient denies cough, chest pain, dyspnea, wheezing or hemoptysis. Chronic midline low back pain without sciatica  Patient denies low back pain or painful or reduced range of motion of the back. States medication helping pain, using tramadol PRN  Last fill of medication was in June. She is almost out and requesting a refill. Taking this medication as needed allows her to remain functional and provide care for her grandchildren      Major depressive disorder with single episode, in partial remission ([address]  Doing okayw ith mood. Still having grandchildren at home,       Current Outpatient Medications on File Prior to Visit   Medication Sig Dispense Refill    tiZANidine (ZANAFLEX) 4 MG tablet Take 1 tablet by mouth every 8 hours as needed (muscle spasm) 40 tablet 0    UNABLE TO FIND Zaki shoe- firm sole. Fit to bottom of foot. Right foot 1 each 0    ibuprofen (ADVIL;MOTRIN) 600 MG tablet Take 1 tablet by mouth every 8 hours as needed for Pain 90 tablet 0    albuterol sulfate HFA (VENTOLIN HFA) 108 (90 Base) MCG/ACT inhaler Inhale 2 puffs into the lungs every 6 hours as needed for Wheezing 1 Inhaler 5    ipratropium-albuterol (DUONEB) 0.5-2.5 (3) MG/3ML SOLN nebulizer solution Take 3 mLs by nebulization 4 times daily 360 mL 0    acetaminophen (TYLENOL) 325 MG tablet Take 2 tablets by mouth every 4 hours as needed for Pain or Fever 120 tablet 3     No current facility-administered medications on file prior to visit. Review of PMH, PSH, Family Hx, Allergies and updates made as needed.     PHQ Scores 1/23/2019 10/31/2018 3/6/2018   PHQ2 Score 2 2 0   PHQ9 Score 2 2 0     Interpretation of Total Score Depression Severity: 1-4 = Minimal depression, 5-9 = Mild depression, 10-14 = Moderate depression, 15-19 = Moderately severe depression, 20-27 = Severe depression    Review of Systems   Constitutional: Negative. Negative for chills, diaphoresis and fever. Respiratory: Negative. Negative for cough, shortness of breath and wheezing. Cardiovascular: Negative. Negative for chest pain, palpitations and leg swelling. Gastrointestinal: Negative. Negative for constipation, diarrhea, nausea and vomiting. Endocrine: Negative. Musculoskeletal: Positive for arthralgias and back pain. Skin: Negative. Neurological: Negative. Negative for headaches. Psychiatric/Behavioral: Positive for dysphoric mood and sleep disturbance. The patient is nervous/anxious. All other systems reviewed and are negative. OBJECTIVE:    /62 (Site: Right Upper Arm, Position: Sitting, Cuff Size: Medium Adult)   Pulse 80   Resp 16   Wt 144 lb 9.6 oz (65.6 kg)   BMI 27.32 kg/m²     Physical Exam   Constitutional: She is oriented to person, place, and time. She appears well-developed and well-nourished. No distress. HENT:   Head: Normocephalic and atraumatic. Eyes: Pupils are equal, round, and reactive to light. Neck: Normal range of motion. Neck supple. Cardiovascular: Normal rate, regular rhythm, normal heart sounds and intact distal pulses. No murmur heard. Pulmonary/Chest: Effort normal and breath sounds normal. No respiratory distress. She has no wheezes. Abdominal: Soft. Musculoskeletal:   Steady gait, functions well with tramadol. Doing well with courrent dose. Not using fast.      Neurological: She is alert and oriented to person, place, and time. Skin: Skin is warm and dry. Capillary refill takes less than 2 seconds. She is not diaphoretic. Psychiatric: She has a normal mood and affect.  Her behavior is normal. Judgment and thought content normal.   Vitals reviewed. ASSESSMENT/PLAN:    Problem List     Chronic midline low back pain without sciatica     Patient denies low back pain or painful or reduced range of motion of the back. States medication helping pain, using tramadol PRN  Last fill of medication was in June. She is almost out and requesting a refill. Taking this medication as needed allows her to remain functional and provide care for her grandchildren           Relevant Medications    methylPREDNISolone sodium (SOLU-MEDROL) injection 125 mg (Completed)    methylPREDNISolone sodium (SOLU-MEDROL) injection 125 mg (Completed)    predniSONE (DELTASONE) tablet 60 mg (Completed)    methylPREDNISolone sodium (SOLU-MEDROL) injection 125 mg (Completed)    methylPREDNISolone sodium (SOLU-MEDROL) injection 125 mg (Completed)    acetaminophen (TYLENOL) 325 MG tablet    methylPREDNISolone sodium (SOLU-MEDROL) injection 60 mg (Completed)    methylPREDNISolone sodium (SOLU-MEDROL) injection 60 mg (Completed)    ibuprofen (ADVIL;MOTRIN) 600 MG tablet    tiZANidine (ZANAFLEX) 4 MG tablet    traMADol (ULTRAM) 50 MG tablet    Major depressive disorder with single episode, in partial remission (HCC) (Chronic)     Doing okayw ith mood. Still having grandchildren at home,          Relevant Medications    traZODone (DESYREL) 100 MG tablet    Moderate COPD (chronic obstructive pulmonary disease) ([facility] [address] - Primary     The patient denies cough, chest pain, dyspnea, wheezing or hemoptysis.            Relevant Medications    ipratropium-albuterol (DUONEB) nebulizer solution 3 ampule (Completed)    methylPREDNISolone sodium (SOLU-MEDROL) injection 125 mg (Completed)    ipratropium-albuterol (DUONEB) nebulizer solution 1 ampule (Completed)    methylPREDNISolone sodium (SOLU-MEDROL) injection 125 mg (Completed)    promethazine-codeine (PHENERGAN with CODEINE) 6.25-10 MG/5ML syrup 5 mL (Completed)    benzonatate (TESSALON) capsule 200 mg (Completed)    predniSONE (DELTASONE) tablet 60 mg (Completed)    ipratropium-albuterol (DUONEB) nebulizer solution 1 ampule (Completed)    methylPREDNISolone sodium (SOLU-MEDROL) injection 125 mg (Completed)    ipratropium-albuterol (DUONEB) nebulizer solution 1 ampule (Completed)    ipratropium-albuterol (DUONEB) nebulizer solution 1 ampule (Completed)    methylPREDNISolone sodium (SOLU-MEDROL) injection 125 mg (Completed)    ipratropium-albuterol (DUONEB) nebulizer solution 1 ampule (Completed)    ipratropium-albuterol (DUONEB) nebulizer solution 1 ampule (Completed)    ipratropium-albuterol (DUONEB) nebulizer solution 1 ampule (Completed)    hydrocodone-chlorpheniramine (TUSSIONEX) 10-8 MG/5ML oral suspension 5 mL (Completed)    methylPREDNISolone sodium (SOLU-MEDROL) injection 60 mg (Completed)    ipratropium-albuterol (DUONEB) nebulizer solution 1 ampule (Completed)    promethazine (PHENERGAN) injection 12.5 mg (Completed)    ipratropium-albuterol (DUONEB) nebulizer solution 1 ampule (Completed)    ipratropium-albuterol (DUONEB) nebulizer solution 1 ampule (Completed)    methylPREDNISolone sodium (SOLU-MEDROL) injection 60 mg (Completed)    ipratropium-albuterol (DUONEB) 0.5-2.5 (3) MG/3ML SOLN nebulizer solution    albuterol sulfate HFA (VENTOLIN HFA) 108 (90 Base) MCG/ACT inhaler    umeclidinium-vilanterol (ANORO ELLIPTA) 62.5-25 MCG/INH AEPB inhaler    montelukast (SINGULAIR) 10 MG tablet    theophylline (SARA-24) 100 MG extended release capsule    Primary insomnia    Relevant Medications    traZODone (DESYREL) 100 MG tablet          Current Outpatient Medications   Medication Sig Dispense Refill    traMADol (ULTRAM) 50 MG tablet Take 1 tablet by mouth every 8 hours as needed for Pain for up to 90 days.  90 tablet 2    umeclidinium-vilanterol (ANORO ELLIPTA) 62.5-25 MCG/INH AEPB inhaler Inhale 1 puff into the lungs daily 1 each 5    montelukast (SINGULAIR) 10 MG tablet TAKE ONE TABLET BY MOUTH ONCE DAILY 30 tablet 3    traZODone (DESYREL) 100 MG improve. Controlled substance monitoring (if applicable to pt. Visit)  Periodic Controlled Substance Monitoring: Possible medication side effects, risk of tolerance/dependence & alternative treatments discussed. , Assessed functional status., Obtaining appropriate analgesic effect of treatment.  LAMBERT Glez - CNP)          (Please note that portions of this note may have beencompleted with a voice recognition program. Efforts were made to edit the dictations but occasionally words are mis-transcribed.)

## 2019-09-10 NOTE — LETTER
reduced coughing, which are especially dangerous for patients with lung disease. Overdose or dangerous interactions with alcohol and other medications may occur, leading to death. Hyperalgesia may develop, in which patients receiving opioids for the treatment of pain may actually become more sensitive to certain painful stimuli, and in some cases, experience pain from ordinarily non-painful stimuli. Women between the ages of 14-53 who could become pregnant should carefully weigh the risks and benefits of opioids with their physicians, as these medications increase the risk of pregnancy complications, including miscarriage,  delivery and stillbirth. It is also possible for babies to be born addicted to opioids. Opioid dependence withdrawal symptoms may include; feelings of uneasiness, increased pain, irritability, belly pain, diarrhea, sweats and goose-flesh. Benzodiazepines and non-benzodiazepine sleep medications: These medications can lead to problems such as addiction/dependence, sedation, fatigue, lightheadedness, dizziness, incoordination, falls, depression, hallucinations, and impaired judgment, memory and concentration. The ability to drive and operate machinery may also be affected. Abnormal sleep-related behaviors have been reported, including sleep walking, driving, making telephone calls, eating, or having sex while not fully awake. These medications can suppress breathing and worsen sleep apnea, particularly when combined with alcohol or other sedating medications, potentially leading to death. Dependence withdrawal symptoms may include tremors, anxiety, hallucinations and seizures. Stimulants:  Common adverse effects include addiction/dependence, increased blood pressure and heart rate, decreased appetite, nausea, involuntary weight loss, insomnia, irritability, and headaches.   These risks may increase when these medications are combined with other · My behavior is inconsistent with the responsibilities outlined above, which may also result in my being prevented from receiving further care from this office. · Other:____________________________________________________________________    AGREEMENT:    I have read the above and have had all of my questions answered. For chronic disease management, I know that my symptoms can be managed with many types of treatments. A chronic medication trial may be part of my treatment, but I must be an active participant in my care. Medication therapy is only one part of my symptom management plan. In some cases, there may be limited scientific evidence to support the chronic use of certain medications to improve symptoms and daily function. Furthermore, in certain circumstances, there may be scientific information that suggests that use of chronic controlled substances may actually worsen my symptoms and increase my risk of unintentional death directly related to this medication therapy. I know that if my provider feels my risk from controlled medications is greater than my benefit, I will have my controlled substance medication(s) compassionately lowered or removed altogether. I agree to a controlled substance medication trial.      I further agree to allow this office to contact my HIPAA contact on file if there are concerns about my safety and use of controlled medications. I have agreed to use the following medications above as instructed by my physician and as stated in this [place] [address].      Patient Signature:  ______________________  Date:9/10/2019 or _____________    Provider Signature:_  Date:9/10/2019 or

## 2019-09-19 ENCOUNTER — CARE COORDINATION (OUTPATIENT)
Dept: CARE COORDINATION | Age: 69
End: 2019-09-19

## 2019-10-08 ENCOUNTER — CARE COORDINATION (OUTPATIENT)
Dept: CARE COORDINATION | Age: 69
End: 2019-10-08

## 2020-01-28 ENCOUNTER — OFFICE VISIT (OUTPATIENT)
Dept: FAMILY MEDICINE CLINIC | Age: 70
End: 2020-01-28
Payer: MEDICARE

## 2020-01-28 VITALS
TEMPERATURE: 98.1 F | BODY MASS INDEX: 25.36 KG/M2 | OXYGEN SATURATION: 94 % | DIASTOLIC BLOOD PRESSURE: 40 MMHG | WEIGHT: 134.2 LBS | HEART RATE: 86 BPM | RESPIRATION RATE: 16 BRPM | SYSTOLIC BLOOD PRESSURE: 128 MMHG

## 2020-01-28 LAB
BILIRUBIN, POC: NEGATIVE
BLOOD URINE, POC: NEGATIVE
CLARITY, POC: CLEAR
COLOR, POC: YELLOW
GLUCOSE URINE, POC: NEGATIVE
KETONES, POC: NEGATIVE
LEUKOCYTE EST, POC: NEGATIVE
NITRITE, POC: NEGATIVE
PH, POC: 7
PROTEIN, POC: NEGATIVE
SPECIFIC GRAVITY, POC: 1.01
UROBILINOGEN, POC: 0.2

## 2020-01-28 PROCEDURE — 1123F ACP DISCUSS/DSCN MKR DOCD: CPT | Performed by: NURSE PRACTITIONER

## 2020-01-28 PROCEDURE — 3023F SPIROM DOC REV: CPT | Performed by: NURSE PRACTITIONER

## 2020-01-28 PROCEDURE — 81002 URINALYSIS NONAUTO W/O SCOPE: CPT | Performed by: NURSE PRACTITIONER

## 2020-01-28 PROCEDURE — G8427 DOCREV CUR MEDS BY ELIG CLIN: HCPCS | Performed by: NURSE PRACTITIONER

## 2020-01-28 PROCEDURE — 93000 ELECTROCARDIOGRAM COMPLETE: CPT | Performed by: NURSE PRACTITIONER

## 2020-01-28 PROCEDURE — G8417 CALC BMI ABV UP PARAM F/U: HCPCS | Performed by: NURSE PRACTITIONER

## 2020-01-28 PROCEDURE — G8926 SPIRO NO PERF OR DOC: HCPCS | Performed by: NURSE PRACTITIONER

## 2020-01-28 PROCEDURE — 4040F PNEUMOC VAC/ADMIN/RCVD: CPT | Performed by: NURSE PRACTITIONER

## 2020-01-28 PROCEDURE — 1090F PRES/ABSN URINE INCON ASSESS: CPT | Performed by: NURSE PRACTITIONER

## 2020-01-28 PROCEDURE — 99215 OFFICE O/P EST HI 40 MIN: CPT | Performed by: NURSE PRACTITIONER

## 2020-01-28 PROCEDURE — G8431 POS CLIN DEPRES SCRN F/U DOC: HCPCS | Performed by: NURSE PRACTITIONER

## 2020-01-28 PROCEDURE — 1036F TOBACCO NON-USER: CPT | Performed by: NURSE PRACTITIONER

## 2020-01-28 PROCEDURE — G0444 DEPRESSION SCREEN ANNUAL: HCPCS | Performed by: NURSE PRACTITIONER

## 2020-01-28 PROCEDURE — G8400 PT W/DXA NO RESULTS DOC: HCPCS | Performed by: NURSE PRACTITIONER

## 2020-01-28 PROCEDURE — 3017F COLORECTAL CA SCREEN DOC REV: CPT | Performed by: NURSE PRACTITIONER

## 2020-01-28 PROCEDURE — G8482 FLU IMMUNIZE ORDER/ADMIN: HCPCS | Performed by: NURSE PRACTITIONER

## 2020-01-28 RX ORDER — TRAMADOL HYDROCHLORIDE 50 MG/1
TABLET ORAL
COMMUNITY
Start: 2020-01-11 | End: 2020-02-17 | Stop reason: SDUPTHER

## 2020-01-28 RX ORDER — BUPROPION HYDROCHLORIDE 150 MG/1
150 TABLET ORAL EVERY MORNING
Qty: 30 TABLET | Refills: 3 | Status: SHIPPED | OUTPATIENT
Start: 2020-01-28 | End: 2020-03-16 | Stop reason: ALTCHOICE

## 2020-01-28 ASSESSMENT — PATIENT HEALTH QUESTIONNAIRE - PHQ9
6. FEELING BAD ABOUT YOURSELF - OR THAT YOU ARE A FAILURE OR HAVE LET YOURSELF OR YOUR FAMILY DOWN: 2
3. TROUBLE FALLING OR STAYING ASLEEP: 3
5. POOR APPETITE OR OVEREATING: 0
SUM OF ALL RESPONSES TO PHQ QUESTIONS 1-9: 15
SUM OF ALL RESPONSES TO PHQ QUESTIONS 1-9: 15
1. LITTLE INTEREST OR PLEASURE IN DOING THINGS: 3
7. TROUBLE CONCENTRATING ON THINGS, SUCH AS READING THE NEWSPAPER OR WATCHING TELEVISION: 3
2. FEELING DOWN, DEPRESSED OR HOPELESS: 0
8. MOVING OR SPEAKING SO SLOWLY THAT OTHER PEOPLE COULD HAVE NOTICED. OR THE OPPOSITE, BEING SO FIGETY OR RESTLESS THAT YOU HAVE BEEN MOVING AROUND A LOT MORE THAN USUAL: 1
SUM OF ALL RESPONSES TO PHQ9 QUESTIONS 1 & 2: 3
4. FEELING TIRED OR HAVING LITTLE ENERGY: 3
10. IF YOU CHECKED OFF ANY PROBLEMS, HOW DIFFICULT HAVE THESE PROBLEMS MADE IT FOR YOU TO DO YOUR WORK, TAKE CARE OF THINGS AT HOME, OR GET ALONG WITH OTHER PEOPLE: 2
9. THOUGHTS THAT YOU WOULD BE BETTER OFF DEAD, OR OF HURTING YOURSELF: 0

## 2020-01-28 NOTE — PROGRESS NOTES
of breath and wheezing. Cardiovascular: Positive for palpitations. Negative for chest pain and leg swelling. Gastrointestinal: Negative for abdominal pain, blood in stool, constipation, diarrhea, nausea and vomiting. Endocrine: Negative for cold intolerance, heat intolerance, polydipsia and polyuria. Genitourinary: Positive for frequency. Negative for difficulty urinating, dysuria, flank pain, hematuria and urgency. Musculoskeletal: Negative for arthralgias, back pain, gait problem, joint swelling, myalgias, neck pain and neck stiffness. Skin: Negative for pallor, rash and wound. Allergic/Immunologic: Negative for environmental allergies, food allergies and immunocompromised state. Neurological: Negative for dizziness, syncope, weakness, light-headedness, numbness and headaches. Hematological: Negative for adenopathy. Does not bruise/bleed easily. Psychiatric/Behavioral: Positive for dysphoric mood. Negative for confusion, decreased concentration, self-injury, sleep disturbance and suicidal ideas. The patient is nervous/anxious. Prior to Visit Medications    Medication Sig Taking? Authorizing Provider   traMADol (ULTRAM) 50 MG tablet  Yes Historical Provider, MD   umeclidinium-vilanterol (ANORO ELLIPTA) 62.5-25 MCG/INH AEPB inhaler Inhale 1 puff into the lungs daily Yes LAMBERT Lozano CNP   traZODone (DESYREL) 100 MG tablet Take 1-2 at night as needed for sleep. Yes LAMBERT Lozano CNP   tiZANidine (ZANAFLEX) 4 MG tablet Take 1 tablet by mouth every 8 hours as needed (muscle spasm) Yes LAMBERT Lozano CNP   UNABLE TO FIND Zaki shoe- firm sole. Fit to bottom of foot.  Right foot Yes LAMBERT Lozano CNP   albuterol sulfate HFA (VENTOLIN HFA) 108 (90 Base) MCG/ACT inhaler Inhale 2 puffs into the lungs every 6 hours as needed for Wheezing Yes Jas Celis MD   ipratropium-albuterol (DUONEB) 0.5-2.5 (3) MG/3ML SOLN nebulizer solution Take 3 mLs by nebulization 4 times daily Yes Arabella Paul MD   acetaminophen (TYLENOL) 325 MG tablet Take 2 tablets by mouth every 4 hours as needed for Pain or Fever Yes Shiva Hayes MD   montelukast (SINGULAIR) 10 MG tablet TAKE ONE TABLET BY MOUTH ONCE DAILY  Patient not taking: Reported on 2020  LAMBERT Parrish CNP   theophylline (SARA-24) 100 MG extended release capsule TAKE ONE CAPSULE BY MOUTH TWICE DAILY  Patient not taking: Reported on 2020  LAMBERT Parrish CNP   ibuprofen (ADVIL;MOTRIN) 600 MG tablet Take 1 tablet by mouth every 8 hours as needed for Pain  Patient not taking: Reported on 2020  LAMBERT Parrish CNP        Social History     Tobacco Use    Smoking status: Former Smoker     Packs/day: 1.00     Years: 30.00     Pack years: 30.00     Last attempt to quit: 2015     Years since quittin.1    Smokeless tobacco: Never Used   Substance Use Topics    Alcohol use: No    Drug use: No        Lab Results   Component Value Date    WBC 8.0 2019    HGB 14.1 2019    HCT 42.5 2019    MCV 97.3 2019     2019     No results found for: LABA1C  No results found for: TSHFT4, TSH  Lab Results   Component Value Date    CHOL 234 (H) 2018    TRIG 79 2018     (H) 2018    LDLCALC 118 (H) 2018    LABVLDL 16 2018       Wt Readings from Last 3 Encounters:   20 134 lb 3.2 oz (60.9 kg)   09/10/19 144 lb 9.6 oz (65.6 kg)   19 147 lb 3.2 oz (66.8 kg)       Wt Readings from Last 3 Encounters:   20 134 lb 3.2 oz (60.9 kg)   09/10/19 144 lb 9.6 oz (65.6 kg)   19 147 lb 3.2 oz (66.8 kg)     Temp Readings from Last 3 Encounters:   20 98.1 °F (36.7 °C) (Temporal)   04/10/19 97.8 °F (36.6 °C) (Oral)   18 96.8 °F (36 °C) (Oral)     BP Readings from Last 3 Encounters:   20 (!) 128/40   09/10/19 116/62   19 120/62     Pulse Readings from Last 3 Encounters:   20 86 09/10/19 80   06/07/19 81        No results found for this visit on 01/28/20. Physical Exam  Vitals signs and nursing note reviewed. Constitutional:       General: She is not in acute distress. Appearance: Normal appearance. She is not ill-appearing, toxic-appearing or diaphoretic. HENT:      Head: Normocephalic and atraumatic. Right Ear: Tympanic membrane, ear canal and external ear normal.      Left Ear: Tympanic membrane, ear canal and external ear normal.      Nose: Nose normal. No congestion or rhinorrhea. Mouth/Throat:      Mouth: Mucous membranes are moist.      Pharynx: Oropharynx is clear. No oropharyngeal exudate or posterior oropharyngeal erythema. Eyes:      Extraocular Movements: Extraocular movements intact. Conjunctiva/sclera: Conjunctivae normal.      Pupils: Pupils are equal, round, and reactive to light. Neck:      Musculoskeletal: Normal range of motion and neck supple. No neck rigidity or muscular tenderness. Cardiovascular:      Rate and Rhythm: Normal rate and regular rhythm. Pulses: Normal pulses. Heart sounds: Normal heart sounds. Pulmonary:      Effort: Pulmonary effort is normal. No respiratory distress. Breath sounds: No stridor. Wheezing present. No rhonchi or rales. Chest:      Chest wall: No tenderness. Abdominal:      General: Abdomen is flat. Bowel sounds are normal. There is no distension. Palpations: Abdomen is soft. There is no mass. Tenderness: There is no abdominal tenderness. There is no right CVA tenderness, left CVA tenderness or guarding. Hernia: No hernia is present. Musculoskeletal: Normal range of motion. General: No swelling or tenderness. Right lower leg: No edema. Left lower leg: No edema. Lymphadenopathy:      Cervical: No cervical adenopathy. Skin:     General: Skin is warm and dry. Capillary Refill: Capillary refill takes less than 2 seconds.       Coloration: Skin is Positive depression screening  Same plan as above. - Positive Screen for Clinical Depression with a Documented Follow-up Plan     4. Moderate COPD (chronic obstructive pulmonary disease) (HCC)  Mild wheezing noted today. Refill given. - umeclidinium-vilanterol (ANORO ELLIPTA) 62.5-25 MCG/INH AEPB inhaler; Inhale 1 puff into the lungs daily  Dispense: 1 each; Refill: 5    5. Frequency of urination  UA negative in office. Advised to continue to monitor and return if symptoms worsen. - POCT Urinalysis no Micro         Return in about 2 weeks (around 2/11/2020) for Depression & chest pain. LAMBERT Forrest - CNP    On the basis of positive PHQ-9 screening (PHQ-9 Total Score: 15), the following plan was implemented: referral for psychotherapy provided to address external stressors. Patient will follow-up in 2 week(s) with PCP.

## 2020-01-28 NOTE — PATIENT INSTRUCTIONS
hours). You inhale most bronchodilators, so they start to act quickly. Always carry your quick-relief inhaler with you in case you need it while you are away from home. ? Corticosteroids (prednisone, budesonide). These reduce airway inflammation. They come in pill or inhaled form. You must take these medicines every day for them to work well.     · A spacer may help you get more inhaled medicine to your lungs. Ask your doctor or pharmacist if a spacer is right for you. If it is, ask how to use it properly.     · Do not take any vitamins, over-the-counter medicine, or herbal products without talking to your doctor first.     · If your doctor prescribed antibiotics, take them as directed. Do not stop taking them just because you feel better. You need to take the full course of antibiotics.     · Oxygen therapy boosts the amount of oxygen in your blood and helps you breathe easier. Use the flow rate your doctor has recommended, and do not change it without talking to your doctor first.   Activity    · Get regular exercise. Walking is an easy way to get exercise. Start out slowly, and walk a little more each day.     · Pay attention to your breathing. You are exercising too hard if you cannot talk while you are exercising.     · Take short rest breaks when doing household chores and other activities.     · Learn breathing methods--such as breathing through pursed lips--to help you become less short of breath.     · If your doctor has not set you up with a pulmonary rehabilitation program, talk to him or her about whether rehab is right for you. Rehab includes exercise programs, education about your disease and how to manage it, help with diet and other changes, and emotional support. Diet    · Eat regular, healthy meals. Use bronchodilators about 1 hour before you eat to make it easier to eat. Eat several small meals instead of three large ones.  Drink beverages at the end of the meal. Avoid foods that are hard to chew.     · Eat foods that contain protein so that you do not lose muscle mass.     · Talk with your doctor if you gain too much weight or if you lose weight without trying.    Mental health    · Talk to your family, friends, or a therapist about your feelings. It is normal to feel frightened, angry, hopeless, helpless, and even guilty. Talking openly about bad feelings can help you cope. If these feelings last, talk to your doctor. When should you call for help? Call 911 anytime you think you may need emergency care. For example, call if:    · You have severe trouble breathing.    Call your doctor now or seek immediate medical care if:    · You have new or worse trouble breathing.     · You cough up blood.     · You have a fever.    Watch closely for changes in your health, and be sure to contact your doctor if:    · You cough more deeply or more often, especially if you notice more mucus or a change in the color of your mucus.     · You have new or worse swelling in your legs or belly.     · You are not getting better as expected. Where can you learn more? Go to https://[employer]ters.[employer]inite. org and sign in to your VSoft account. Enter G573 in the [employer] box to learn more about \"Chronic Obstructive Pulmonary Disease (COPD): Care Instructions. \"     If you do not have an account, please click on the \"Sign Up Now\" link. Current as of: June 9, 2019  Content Version: 12.3  © 8489-2428 [employer]. Care instructions adapted under license by [facility] ([facility]). If you have questions about a medical condition or this instruction, always ask your healthcare professional. [place] [address] any warranty or liability for your use of this information. Patient Education        Depression and Chronic Disease: Care Instructions  Your Care Instructions    A chronic disease is one that you have for a long time.  Some chronic diseases can be controlled, but they usually cannot be cured. Depression is common in people with chronic diseases, but it often goes unnoticed. Many people have concerns about seeking treatment for a mental health problem. You may think it's a sign of weakness, or you don't want people to know about it. It's important to overcome these reasons for not seeking treatment. Treating depression or anxiety is good for your health. Follow-up care is a key part of your treatment and safety. Be sure to make and go to all appointments, and call your doctor if you are having problems. It's also a good idea to know your test results and keep a list of the medicines you take. How can you care for yourself at home? Watch for symptoms of depression  The symptoms of depression are often subtle at first. You may think they are caused by your disease rather than depression. Or you may think it is normal to be depressed when you have a chronic disease. If you are depressed you may:  · Feel sad or hopeless. · Feel guilty or worthless. · Not enjoy the things you used to enjoy. · Feel hopeless, as though life is not worth living. · Have trouble thinking or remembering. · Have low energy, and you may not eat or sleep well. · Pull away from others. · Think often about death or killing yourself. (Keep the numbers for these national suicide hotlines: 9-684-573-TALK [1-467.946.5702] and 6-573-XYHKLXZ [1-596.603.1993]. )  Get treatment  By treating your depression, you can feel more hopeful and have more energy. If you feel better, you may take better care of yourself, so your health may improve. · Talk to your doctor if you have any changes in mood during treatment for your disease. · Ask your doctor for help. Counseling, antidepressant medicine, or a combination of the two can help most people with depression. Often a combination works best. Counseling can also help you cope with having a chronic disease. When should you call for help?   Call 911 anytime you think you may need emergency care. For example, call if:    · You feel like hurting yourself or someone else.     · Someone you know has depression and is about to attempt or is attempting suicide.   [facility] your doctor now or seek immediate medical care if:    · You hear voices.     · Someone you know has depression and:  ? Starts to give away his or her possessions. ? Uses illegal drugs or drinks alcohol heavily. ? Talks or writes about death, including writing suicide notes or talking about guns, knives, or pills. ? Starts to spend a lot of time alone. ? Acts very aggressively or suddenly appears calm.    Watch closely for changes in your health, and be sure to contact your doctor if:    · You do not get better as expected. Where can you learn more? Go to https://[employer]ights.[employer] org and sign in to your [email or web address] account. Enter Y970 in the Virtual[employer] box to learn more about \"Depression and Chronic Disease: Care Instructions. \"     If you do not have an account, please click on the \"Sign Up Now\" link. Current as of: May 28, 2019  Content Version: 12.3  © 1955-7987 [employer]. Care instructions adapted under license by [facility] ([facility]). If you have questions about a medical condition or this instruction, always ask your healthcare professional. [address] any warranty or liability for your use of this information. Patient Education        Depression Treatment: Care Instructions  Your Care Instructions    Depression is a condition that affects the way you feel, think, and act. It causes symptoms such as low energy, loss of interest in daily activities, and sadness or grouchiness that goes on for a long time. Depression is very common and affects men and women of all ages. Depression is a medical illness caused by changes in the natural chemicals in your brain. It is not a character flaw, and it does not mean that you are a bad or weak person.  It does not mean that you are going crazy. It is important to know that depression can be treated. Medicines, counseling, and self-care can all help. Many people do not get help because they are embarrassed or think that they will get over the depression on their own. But some people do not get better without treatment. Follow-up care is a key part of your treatment and safety. Be sure to make and go to all appointments, and call your doctor if you are having problems. It's also a good idea to know your test results and keep a list of the medicines you take. How can you care for yourself at home? Learn about antidepressant medicines  Antidepressant medicines can improve or end the symptoms of depression. You may need to take the medicine for at least 6 months, and often longer. Keep taking your medicine even if you feel better. If you stop taking it too soon, your symptoms may come back or get worse. You may start to feel better within 1 to 3 weeks of taking antidepressant medicine. But it can take as many as 6 to 8 weeks to see more improvement. Talk to your doctor if you have problems with your medicine or if you do not notice any improvement after 3 weeks. Antidepressants can make you feel tired, dizzy, or nervous. Some people have dry mouth, constipation, headaches, sexual problems, an upset stomach, or diarrhea. Many of these side effects are mild and go away on their own after you take the medicine for a few weeks. Some may last longer. Talk to your doctor if side effects bother you too much. You might be able to try a different medicine. If you are pregnant or breastfeeding, talk to your doctor about what medicines you can take. Learn about counseling  In many cases, counseling can work as well as medicines to treat mild to moderate depression. Counseling is done by licensed mental health providers, such as psychologists, social workers, and some types of nurses.  It can be done in one-on-one sessions or in a hotlines: 0-734-434-TALK (6-473.481.8872) and 0-007-UQUOXHY (7-694.157.7487). If you or someone you know talks about suicide or feeling hopeless, get help right away. When should you call for help? Call 911 anytime you think you may need emergency care. For example, call if:    · You feel you cannot stop from hurting yourself or someone else.   [facility] your doctor now or seek immediate medical care if:    · You hear voices.     · You feel much more depressed.    Watch closely for changes in your health, and be sure to contact your doctor if:    · You are having problems with your depression medicine.     · You are not getting better as expected. Where can you learn more? Go to https://Mr BananapeFly Mediaeb.Curt[employer] Er[employer]ª. org and sign in to your Toroleo account. Enter N978 in the [employer] box to learn more about \"Depression Treatment: Care Instructions. \"     If you do not have an account, please click on the \"Sign Up Now\" link. Current as of: May 28, 2019  Content Version: 12.3  © 9479-0719 [employer]wise[employer]. Care instructions adapted under license by [facility] ([facility]). If you have questions about a medical condition or this instruction, always ask your healthcare professional. [address] any warranty or liability for your use of this information.

## 2020-01-29 ASSESSMENT — ENCOUNTER SYMPTOMS
TROUBLE SWALLOWING: 0
NAUSEA: 0
CHEST TIGHTNESS: 0
PHOTOPHOBIA: 0
COUGH: 0
SINUS PRESSURE: 0
BACK PAIN: 0
CONSTIPATION: 0
SORE THROAT: 0
EYE PAIN: 0
WHEEZING: 0
RHINORRHEA: 0
ABDOMINAL PAIN: 0
DIARRHEA: 0
SHORTNESS OF BREATH: 0
SINUS PAIN: 0
VOMITING: 0
BLOOD IN STOOL: 0

## 2020-02-05 ENCOUNTER — NURSE ONLY (OUTPATIENT)
Dept: CARDIOLOGY CLINIC | Age: 70
End: 2020-02-05
Payer: MEDICARE

## 2020-02-05 ENCOUNTER — INITIAL CONSULT (OUTPATIENT)
Dept: CARDIOLOGY CLINIC | Age: 70
End: 2020-02-05
Payer: MEDICARE

## 2020-02-05 VITALS
BODY MASS INDEX: 26.11 KG/M2 | WEIGHT: 138.2 LBS | DIASTOLIC BLOOD PRESSURE: 64 MMHG | SYSTOLIC BLOOD PRESSURE: 122 MMHG | RESPIRATION RATE: 16 BRPM | HEART RATE: 72 BPM

## 2020-02-05 PROCEDURE — 93228 REMOTE 30 DAY ECG REV/REPORT: CPT | Performed by: INTERNAL MEDICINE

## 2020-02-05 PROCEDURE — 99204 OFFICE O/P NEW MOD 45 MIN: CPT | Performed by: INTERNAL MEDICINE

## 2020-02-05 PROCEDURE — G8417 CALC BMI ABV UP PARAM F/U: HCPCS | Performed by: INTERNAL MEDICINE

## 2020-02-05 PROCEDURE — G8482 FLU IMMUNIZE ORDER/ADMIN: HCPCS | Performed by: INTERNAL MEDICINE

## 2020-02-05 PROCEDURE — 1090F PRES/ABSN URINE INCON ASSESS: CPT | Performed by: INTERNAL MEDICINE

## 2020-02-05 PROCEDURE — G8427 DOCREV CUR MEDS BY ELIG CLIN: HCPCS | Performed by: INTERNAL MEDICINE

## 2020-02-05 NOTE — PATIENT INSTRUCTIONS
Please hold on to these instructions the  will call you within 1-9 business days when we receive authorization from your insurance. Nuclear Stress Test    WHAT TO EXPECT:   ? You will need to confirm the test or it could be cancelled. ? This test will take approximately 2 hours: 1 hour in the AM &    1 hour in the PM. You will be given a time by the Technologist after the first part is completed to come back. ? You will be given a medication, through an IV in the hand, this will safely simulate exercise. This IV is also needed to inject the radioactive isotope unless you are able toe walk the treadmill. ? You will receive an injection in the AM & PM before the pictures. ? Using a special camera, you will have one set of pictures of your heart taken in the AM and a set of pictures in the PM.     PREPARATION FOR TEST:  ? Eat a light breakfast such as water or juice and toast.  ? If you are DIABETIC: Eat a normal breakfast with NO CAFFEINE and take your insulin as normal.   ? AVOID ALL FOODS & DRINKS containing CAFFEINE 24 HOURS PRIOR TO THE TEST: Including coffee, Tea, Jorge and other soft drinks even those labeled  caffeine free or decaffeinated.  ? HOLD THESE MEDICATIONS Persantine & Theophylline (Theodur)  24 hours prior & bring your inhaler with you. Please hold on to these instructions the  will call you within 1-9 business days when we receive authorization from your insurance. Echocardiogram    WHAT TO EXPECT:   ? This test will take approximately 45 minutes. ? It is an ultrasound of the heart. ? It can look at the valves and chambers inside the heart   ? There is no special instructions for this test.     If you are unable to keep this appointment, please notify us 24 hours prior to test at [phone].

## 2020-02-05 NOTE — PROGRESS NOTES
hours. No results for input(s): NA, K, CL, CO2, PHOS, BUN, CREATININE in the last 72 hours. Invalid input(s): CA  No results for input(s): AST, ALT, ALB, BILIDIR, BILITOT, ALKPHOS in the last 72 hours. No results for input(s): TROPONINI in the last 72 hours. No results found for: BNP  Lab Results   Component Value Date    INR 0.97 02/26/2018    PROTIME 11.1 02/26/2018         EKG:nsr    Chest Xray:    ECHO:normal LVEF  Labs, echo, meds reviewed  Assessment: 79 y. o.year old with PMH of  has a past medical history of Asthma, Back ache, and COPD (chronic obstructive pulmonary disease) ([facility] [address] Recommendations:    1. Chest pain: stress test and echo ordered  2. Shortness of breath: it could be COPD related, will get stress test and echo  3. Palpitations\" 1 week event monitor ordered  4. COPD: not smoking anymore  5. Health maintenance: exerise and diet  All labs, medications and tests reviewed, continue all other medications of all above medical condition listed as is.          Alberto Barr MD, 2/5/2020 10:19 AM

## 2020-02-17 ENCOUNTER — OFFICE VISIT (OUTPATIENT)
Dept: FAMILY MEDICINE CLINIC | Age: 70
End: 2020-02-17
Payer: MEDICARE

## 2020-02-17 VITALS
TEMPERATURE: 97.8 F | DIASTOLIC BLOOD PRESSURE: 60 MMHG | WEIGHT: 138.4 LBS | HEART RATE: 71 BPM | OXYGEN SATURATION: 94 % | RESPIRATION RATE: 16 BRPM | SYSTOLIC BLOOD PRESSURE: 123 MMHG | BODY MASS INDEX: 26.15 KG/M2

## 2020-02-17 PROCEDURE — G8926 SPIRO NO PERF OR DOC: HCPCS | Performed by: NURSE PRACTITIONER

## 2020-02-17 PROCEDURE — 3023F SPIROM DOC REV: CPT | Performed by: NURSE PRACTITIONER

## 2020-02-17 PROCEDURE — 1090F PRES/ABSN URINE INCON ASSESS: CPT | Performed by: NURSE PRACTITIONER

## 2020-02-17 PROCEDURE — 3017F COLORECTAL CA SCREEN DOC REV: CPT | Performed by: NURSE PRACTITIONER

## 2020-02-17 PROCEDURE — 1036F TOBACCO NON-USER: CPT | Performed by: NURSE PRACTITIONER

## 2020-02-17 PROCEDURE — 1123F ACP DISCUSS/DSCN MKR DOCD: CPT | Performed by: NURSE PRACTITIONER

## 2020-02-17 PROCEDURE — G8427 DOCREV CUR MEDS BY ELIG CLIN: HCPCS | Performed by: NURSE PRACTITIONER

## 2020-02-17 PROCEDURE — 99214 OFFICE O/P EST MOD 30 MIN: CPT | Performed by: NURSE PRACTITIONER

## 2020-02-17 PROCEDURE — G8510 SCR DEP NEG, NO PLAN REQD: HCPCS | Performed by: NURSE PRACTITIONER

## 2020-02-17 PROCEDURE — G8400 PT W/DXA NO RESULTS DOC: HCPCS | Performed by: NURSE PRACTITIONER

## 2020-02-17 PROCEDURE — 4040F PNEUMOC VAC/ADMIN/RCVD: CPT | Performed by: NURSE PRACTITIONER

## 2020-02-17 PROCEDURE — G8417 CALC BMI ABV UP PARAM F/U: HCPCS | Performed by: NURSE PRACTITIONER

## 2020-02-17 PROCEDURE — G8482 FLU IMMUNIZE ORDER/ADMIN: HCPCS | Performed by: NURSE PRACTITIONER

## 2020-02-17 RX ORDER — ALBUTEROL SULFATE 90 UG/1
2 AEROSOL, METERED RESPIRATORY (INHALATION) EVERY 6 HOURS PRN
Qty: 1 INHALER | Refills: 5 | Status: SHIPPED | OUTPATIENT
Start: 2020-02-17 | End: 2020-04-13 | Stop reason: SDUPTHER

## 2020-02-17 RX ORDER — TRAMADOL HYDROCHLORIDE 50 MG/1
50 TABLET ORAL EVERY 8 HOURS PRN
Qty: 90 TABLET | Refills: 0 | Status: SHIPPED | OUTPATIENT
Start: 2020-02-17 | End: 2020-05-19

## 2020-02-17 ASSESSMENT — PATIENT HEALTH QUESTIONNAIRE - PHQ9
1. LITTLE INTEREST OR PLEASURE IN DOING THINGS: 1
2. FEELING DOWN, DEPRESSED OR HOPELESS: 1
SUM OF ALL RESPONSES TO PHQ9 QUESTIONS 1 & 2: 2
SUM OF ALL RESPONSES TO PHQ QUESTIONS 1-9: 2
SUM OF ALL RESPONSES TO PHQ QUESTIONS 1-9: 2

## 2020-02-17 NOTE — PATIENT INSTRUCTIONS
Chronic Obstructive Pulmonary Disease (COPD): Care Instructions  Your Care Instructions    Chronic obstructive pulmonary disease (COPD) is a general term for a group of lung diseases, including emphysema and chronic bronchitis. People with COPD have decreased airflow in and out of the lungs, which makes it hard to breathe. The airways also can get clogged with thick mucus. Cigarette smoking is a major cause of COPD. Although there is no cure for COPD, you can slow its progress. Following your treatment plan and taking care of yourself can help you feel better and live longer. Follow-up care is a key part of your treatment and safety. Be sure to make and go to all appointments, and call your doctor if you are having problems. It's also a good idea to know your test results and keep a list of the medicines you take. How can you care for yourself at home?   Staying healthy    · Do not smoke. This is the most important step you can take to prevent more damage to your lungs. If you need help quitting, talk to your doctor about stop-smoking programs and medicines. These can increase your chances of quitting for good.     · Avoid colds and flu. Get a pneumococcal vaccine shot. If you have had one before, ask your doctor whether you need a second dose. Get the flu vaccine every fall. If you must be around people with colds or the flu, wash your hands often.     · Avoid secondhand smoke, air pollution, and high altitudes. Also avoid cold, dry air and hot, humid air. Stay at home with your windows closed when air pollution is bad.    Medicines and oxygen therapy    · Take your medicines exactly as prescribed. Call your doctor if you think you are having a problem with your medicine.     · You may be taking medicines such as:  ? Bronchodilators. These help open your airways and make breathing easier. Bronchodilators are either short-acting (work for 6 to 9 hours) or long-acting (work for 24 hours).  You inhale most bronchodilators, so they start to act quickly. Always carry your quick-relief inhaler with you in case you need it while you are away from home. ? Corticosteroids (prednisone, budesonide). These reduce airway inflammation. They come in pill or inhaled form. You must take these medicines every day for them to work well.     · A spacer may help you get more inhaled medicine to your lungs. Ask your doctor or pharmacist if a spacer is right for you. If it is, ask how to use it properly.     · Do not take any vitamins, over-the-counter medicine, or herbal products without talking to your doctor first.     · If your doctor prescribed antibiotics, take them as directed. Do not stop taking them just because you feel better. You need to take the full course of antibiotics.     · Oxygen therapy boosts the amount of oxygen in your blood and helps you breathe easier. Use the flow rate your doctor has recommended, and do not change it without talking to your doctor first.   Activity    · Get regular exercise. Walking is an easy way to get exercise. Start out slowly, and walk a little more each day.     · Pay attention to your breathing. You are exercising too hard if you cannot talk while you are exercising.     · Take short rest breaks when doing household chores and other activities.     · Learn breathing methods--such as breathing through pursed lips--to help you become less short of breath.     · If your doctor has not set you up with a pulmonary rehabilitation program, talk to him or her about whether rehab is right for you. Rehab includes exercise programs, education about your disease and how to manage it, help with diet and other changes, and emotional support. Diet    · Eat regular, healthy meals. Use bronchodilators about 1 hour before you eat to make it easier to eat. Eat several small meals instead of three large ones.  Drink beverages at the end of the meal. Avoid foods that are hard to chew.     · Eat foods that contain protein so that you do not lose muscle mass.     · Talk with your doctor if you gain too much weight or if you lose weight without trying.    Mental health    · Talk to your family, friends, or a therapist about your feelings. It is normal to feel frightened, angry, hopeless, helpless, and even guilty. Talking openly about bad feelings can help you cope. If these feelings last, talk to your doctor. When should you call for help? Call 911 anytime you think you may need emergency care. For example, call if:    · You have severe trouble breathing.    Call your doctor now or seek immediate medical care if:    · You have new or worse trouble breathing.     · You cough up blood.     · You have a fever.    Watch closely for changes in your health, and be sure to contact your doctor if:    · You cough more deeply or more often, especially if you notice more mucus or a change in the color of your mucus.     · You have new or worse swelling in your legs or belly.     · You are not getting better as expected. Where can you learn more? Go to [email or web address]://[email or web address].[email or web address]. org and sign in to your [employer] account. Enter Q282 in the ePrivateH[employer] box to learn more about \"Chronic Obstructive Pulmonary Disease (COPD): Care Instructions. \"     If you do not have an account, please click on the \"Sign Up Now\" link. Current as of: June 9, 2019  Content Version: 12.3  © 6388-0601 [employer], [employer]orporated. Care instructions adapted under license by [facility] ([facility]). If you have questions about a medical condition or this instruction, always ask your healthcare professional. [address] any warranty or liability for your use of this information.

## 2020-02-17 NOTE — PROGRESS NOTES
kg)   02/05/20 138 lb 3.2 oz (62.7 kg)   01/28/20 134 lb 3.2 oz (60.9 kg)     Temp Readings from Last 3 Encounters:   02/17/20 97.8 °F (36.6 °C) (Temporal)   01/28/20 98.1 °F (36.7 °C) (Temporal)   04/10/19 97.8 °F (36.6 °C) (Oral)     BP Readings from Last 3 Encounters:   02/17/20 123/60   02/05/20 122/64   01/28/20 (!) 128/40     Pulse Readings from Last 3 Encounters:   02/17/20 71   02/05/20 72   01/28/20 86        No results found for this visit on 02/17/20. Physical Exam  Vitals signs and nursing note reviewed. Constitutional:       General: She is not in acute distress. Appearance: Normal appearance. She is not ill-appearing, toxic-appearing or diaphoretic. HENT:      Head: Normocephalic and atraumatic. Right Ear: Tympanic membrane, ear canal and external ear normal.      Left Ear: Tympanic membrane, ear canal and external ear normal.      Nose: Nose normal. No congestion or rhinorrhea. Mouth/Throat:      Mouth: Mucous membranes are moist.      Pharynx: Oropharynx is clear. No oropharyngeal exudate or posterior oropharyngeal erythema. Eyes:      Extraocular Movements: Extraocular movements intact. Conjunctiva/sclera: Conjunctivae normal.      Pupils: Pupils are equal, round, and reactive to light. Neck:      Musculoskeletal: Normal range of motion and neck supple. No neck rigidity or muscular tenderness. Cardiovascular:      Rate and Rhythm: Normal rate and regular rhythm. Pulses: Normal pulses. Heart sounds: Normal heart sounds. Pulmonary:      Effort: Pulmonary effort is normal. No respiratory distress. Breath sounds: Decreased air movement present. No stridor. Decreased breath sounds present. No wheezing, rhonchi or rales. Chest:      Chest wall: No tenderness. Abdominal:      General: Abdomen is flat. Bowel sounds are normal. There is no distension. Palpations: Abdomen is soft. There is no mass. Tenderness: There is no abdominal tenderness. There is no right CVA tenderness, left CVA tenderness or guarding. Hernia: No hernia is present. Musculoskeletal: Normal range of motion. General: No swelling or tenderness. Right lower leg: No edema. Left lower leg: No edema. Lymphadenopathy:      Cervical: No cervical adenopathy. Skin:     General: Skin is warm and dry. Capillary Refill: Capillary refill takes less than 2 seconds. Coloration: Skin is not pale. Findings: No bruising, erythema, lesion or rash. Neurological:      General: No focal deficit present. Mental Status: She is alert and oriented to person, place, and time. Motor: No weakness. Coordination: Coordination normal.      Gait: Gait normal.   Psychiatric:         Attention and Perception: Attention and perception normal.         Mood and Affect: Mood is depressed. Mood is not anxious. Affect is not tearful. Speech: Speech normal.         Behavior: Behavior normal. Behavior is cooperative. Thought Content: Thought content normal.         Judgment: Judgment normal.       PHQ Scores 2/17/2020 1/28/2020 1/23/2019 10/31/2018 3/6/2018   PHQ2 Score 2 3 2 2 0   PHQ9 Score 2 15 2 2 0     Interpretation of Total Score Depression Severity: 1-4 = Minimal depression, 5-9 = Mild depression, 10-14 = Moderate depression, 15-19 = Moderately severe depression, 20-27 = Severe depression    ASSESSMENT AND PLAN:    1. Depression, unspecified depression type  She feels that her depression is improving with the Wellbutrin and she states that she is compliant with taking the medication. She is not as open with discussion today due to her young granddaughters being in the exam room during out visit. Denies SI or HI. She is scheduled with Dr. Kimberlee Vera tomorrow to discuss her depression and stressors. 2. History of palpitations  Continue f/u with cardiology    3.  Moderate COPD (chronic obstructive pulmonary disease) ([facility] [address]  Discussed her returning to pulmonology for her annual f/u and she is considering making an appointment. She feels she is well controlled at this time. - albuterol sulfate HFA (VENTOLIN HFA) 108 (90 Base) MCG/ACT inhaler; Inhale 2 puffs into the lungs every 6 hours as needed for Wheezing  Dispense: 1 Inhaler; Refill: 5    4. Chronic midline low back pain without sciatica  She has been taking Ultram for chronic back pain and has a previous medication contract with her prior PCP in this office that I have replaced. I have reviewed her prescription monitoring report and their is no suspicious activity. I have signed a new medication contract with the patient today and I have refilled her prescription for 30 days. - traMADol (ULTRAM) 50 MG tablet; Take 1 tablet by mouth every 8 hours as needed for Pain (as needed for back pain) for up to 30 days. Dispense: 90 tablet; Refill: 0         Return in about 4 weeks (around 3/16/2020) for Depression.     LAMBERT Haines - CNP

## 2020-02-18 ENCOUNTER — PROCEDURE VISIT (OUTPATIENT)
Dept: CARDIOLOGY CLINIC | Age: 70
End: 2020-02-18
Payer: MEDICARE

## 2020-02-18 PROBLEM — Z87.898 HISTORY OF PALPITATIONS: Status: ACTIVE | Noted: 2020-02-18

## 2020-02-18 LAB
LV EF: 53 %
LV EF: 75 %
LVEF MODALITY: NORMAL
LVEF MODALITY: NORMAL

## 2020-02-18 PROCEDURE — 78452 HT MUSCLE IMAGE SPECT MULT: CPT | Performed by: INTERNAL MEDICINE

## 2020-02-18 PROCEDURE — 93017 CV STRESS TEST TRACING ONLY: CPT | Performed by: INTERNAL MEDICINE

## 2020-02-18 PROCEDURE — 93018 CV STRESS TEST I&R ONLY: CPT | Performed by: INTERNAL MEDICINE

## 2020-02-18 PROCEDURE — 93016 CV STRESS TEST SUPVJ ONLY: CPT | Performed by: INTERNAL MEDICINE

## 2020-02-18 PROCEDURE — 93306 TTE W/DOPPLER COMPLETE: CPT | Performed by: INTERNAL MEDICINE

## 2020-02-18 PROCEDURE — A9500 TC99M SESTAMIBI: HCPCS | Performed by: INTERNAL MEDICINE

## 2020-02-18 ASSESSMENT — ENCOUNTER SYMPTOMS
ABDOMINAL PAIN: 0
RHINORRHEA: 0
CHEST TIGHTNESS: 0
ABDOMINAL DISTENTION: 0
EYE DISCHARGE: 0
CONSTIPATION: 0
BACK PAIN: 0
SORE THROAT: 0
VOMITING: 0
DIARRHEA: 0
WHEEZING: 0
EYE PAIN: 0
SINUS PAIN: 0
NAUSEA: 0
COUGH: 0
SHORTNESS OF BREATH: 0
EYE REDNESS: 0
BLOOD IN STOOL: 0
SINUS PRESSURE: 0
PHOTOPHOBIA: 0
TROUBLE SWALLOWING: 0

## 2020-02-25 ENCOUNTER — TELEPHONE (OUTPATIENT)
Dept: CARDIOLOGY CLINIC | Age: 70
End: 2020-02-25

## 2020-03-04 ENCOUNTER — OFFICE VISIT (OUTPATIENT)
Dept: CARDIOLOGY CLINIC | Age: 70
End: 2020-03-04
Payer: MEDICARE

## 2020-03-04 VITALS
BODY MASS INDEX: 26.7 KG/M2 | DIASTOLIC BLOOD PRESSURE: 82 MMHG | HEART RATE: 76 BPM | SYSTOLIC BLOOD PRESSURE: 134 MMHG | WEIGHT: 136 LBS | HEIGHT: 60 IN

## 2020-03-04 PROCEDURE — 3017F COLORECTAL CA SCREEN DOC REV: CPT | Performed by: INTERNAL MEDICINE

## 2020-03-04 PROCEDURE — 99213 OFFICE O/P EST LOW 20 MIN: CPT | Performed by: INTERNAL MEDICINE

## 2020-03-04 PROCEDURE — G8482 FLU IMMUNIZE ORDER/ADMIN: HCPCS | Performed by: INTERNAL MEDICINE

## 2020-03-04 PROCEDURE — G8400 PT W/DXA NO RESULTS DOC: HCPCS | Performed by: INTERNAL MEDICINE

## 2020-03-04 PROCEDURE — G8417 CALC BMI ABV UP PARAM F/U: HCPCS | Performed by: INTERNAL MEDICINE

## 2020-03-04 PROCEDURE — 1123F ACP DISCUSS/DSCN MKR DOCD: CPT | Performed by: INTERNAL MEDICINE

## 2020-03-04 PROCEDURE — 1090F PRES/ABSN URINE INCON ASSESS: CPT | Performed by: INTERNAL MEDICINE

## 2020-03-04 PROCEDURE — 1036F TOBACCO NON-USER: CPT | Performed by: INTERNAL MEDICINE

## 2020-03-04 PROCEDURE — G8427 DOCREV CUR MEDS BY ELIG CLIN: HCPCS | Performed by: INTERNAL MEDICINE

## 2020-03-04 PROCEDURE — 4040F PNEUMOC VAC/ADMIN/RCVD: CPT | Performed by: INTERNAL MEDICINE

## 2020-03-04 NOTE — PROGRESS NOTES
Francisco Santiago MD        OFFICE  FOLLOWUP NOTE    Chief complaints: patient is here for management of palpitations    Subjective: palpitations are better    HPI Corky De Guzman is a 79 y. o.year old who  has a past medical history of Asthma, Back ache, COPD (chronic obstructive pulmonary disease) ([address] H/O echocardiogram, and History of nuclear stress test. and presents for management of palpitations which are well controlled  She had normal stress test, echo and had event monitor which showed some PVCs, she had symptoms of palpitations during sinus rhythm. Current Outpatient Medications   Medication Sig Dispense Refill    albuterol sulfate HFA (VENTOLIN HFA) 108 (90 Base) MCG/ACT inhaler Inhale 2 puffs into the lungs every 6 hours as needed for Wheezing 1 Inhaler 5    traMADol (ULTRAM) 50 MG tablet Take 1 tablet by mouth every 8 hours as needed for Pain (as needed for back pain) for up to 30 days. 90 tablet 0    umeclidinium-vilanterol (ANORO ELLIPTA) 62.5-25 MCG/INH AEPB inhaler Inhale 1 puff into the lungs daily 1 each 5    buPROPion (WELLBUTRIN XL) 150 MG extended release tablet Take 1 tablet by mouth every morning 30 tablet 3    montelukast (SINGULAIR) 10 MG tablet TAKE ONE TABLET BY MOUTH ONCE DAILY 30 tablet 3    traZODone (DESYREL) 100 MG tablet Take 1-2 at night as needed for sleep. 60 tablet 5    theophylline (SARA-24) 100 MG extended release capsule TAKE ONE CAPSULE BY MOUTH TWICE DAILY 60 capsule 5    tiZANidine (ZANAFLEX) 4 MG tablet Take 1 tablet by mouth every 8 hours as needed (muscle spasm) 40 tablet 0    UNABLE TO FIND Zaki shoe- firm sole. Fit to bottom of foot.  Right foot 1 each 0    ibuprofen (ADVIL;MOTRIN) 600 MG tablet Take 1 tablet by mouth every 8 hours as needed for Pain 90 tablet 0    ipratropium-albuterol (DUONEB) 0.5-2.5 (3) MG/3ML SOLN nebulizer solution Take 3 mLs by nebulization 4 times daily 360 mL 0    acetaminophen (TYLENOL) 325 MG tablet Take 2 tablets by mouth every 4 hours as needed for Pain or Fever 120 tablet 3     No current facility-administered medications for this visit. Allergies: Patient has no known allergies. Past Medical History:   Diagnosis Date    Asthma     Back ache     reason for taking tramadol    COPD (chronic obstructive pulmonary disease) ([address]     H/O echocardiogram 2020    EF 50-55%. Sclerotic, but non-stenotic aortic valve. Moderate AR. Mild TR, MR.     History of nuclear stress test 2020    EF 75%. This is a normal study.      Past Surgical History:   Procedure Laterality Date    APPENDECTOMY      HYSTERECTOMY      MIDDLE EAR SURGERY       Family History   Problem Relation Age of Onset    Asthma Mother     Diabetes Father     Asthma Sister     Stroke Sister     Cancer Brother     Cancer Maternal Grandmother     Heart Disease Maternal Grandmother     Colon Cancer Maternal Grandfather      Social History     Tobacco Use    Smoking status: Former Smoker     Packs/day: 1.00     Years: 30.00     Pack years: 30.00     Last attempt to quit: 2015     Years since quittin.2    Smokeless tobacco: Never Used   Substance Use Topics    Alcohol use: No      [unfilled]  Review of Systems:   · Constitutional: No Fever or Weight Loss   · Eyes: No Decreased Vision  · ENT: No Headaches, Hearing Loss or Vertigo  · Cardiovascular: No chest pain, dyspnea on exertion, palpitations or loss of consciousness  · Respiratory: No cough or wheezing    · Gastrointestinal: No abdominal pain, appetite loss, blood in stools, constipation, diarrhea or heartburn  · Genitourinary: No dysuria, trouble voiding, or hematuria  · Musculoskeletal:  No gait disturbance, weakness or joint complaints  · Integumentary: No rash or pruritis  · Neurological: No TIA or stroke symptoms  · Psychiatric: No anxiety or depression  · Endocrine: No malaise, fatigue or temperature intolerance  · Hematologic/Lymphatic: No bleeding problems, blood clots

## 2020-03-16 ENCOUNTER — TELEPHONE (OUTPATIENT)
Dept: FAMILY MEDICINE CLINIC | Age: 70
End: 2020-03-16

## 2020-03-16 RX ORDER — BUPROPION HYDROCHLORIDE 300 MG/1
300 TABLET ORAL EVERY MORNING
Qty: 30 TABLET | Refills: 0 | Status: SHIPPED | OUTPATIENT
Start: 2020-03-16 | End: 2020-05-19

## 2020-03-16 NOTE — TELEPHONE ENCOUNTER
Spoke with patient and she gave verbal understanding on rescheduling. She did however state that she doesn't feel like the Wellbutrin is working. She also stated that with everything going her aniexty is much higher. Please advise.     Rescheduled patient for April 13, 2020

## 2020-03-25 ENCOUNTER — TELEPHONE (OUTPATIENT)
Dept: FAMILY MEDICINE CLINIC | Age: 70
End: 2020-03-25

## 2020-04-13 RX ORDER — TRAZODONE HYDROCHLORIDE 100 MG/1
TABLET ORAL
Qty: 60 TABLET | Refills: 5 | Status: SHIPPED | OUTPATIENT
Start: 2020-04-13 | End: 2020-10-22 | Stop reason: SDUPTHER

## 2020-04-13 RX ORDER — ALBUTEROL SULFATE 90 UG/1
2 AEROSOL, METERED RESPIRATORY (INHALATION) EVERY 6 HOURS PRN
Qty: 1 INHALER | Refills: 5 | Status: SHIPPED | OUTPATIENT
Start: 2020-04-13 | End: 2020-12-28 | Stop reason: SDUPTHER

## 2020-04-13 RX ORDER — UMECLIDINIUM BROMIDE AND VILANTEROL TRIFENATATE 62.5; 25 UG/1; UG/1
1 POWDER RESPIRATORY (INHALATION) DAILY
Qty: 1 EACH | Refills: 5 | Status: SHIPPED | OUTPATIENT
Start: 2020-04-13 | End: 2020-07-10 | Stop reason: SDUPTHER

## 2020-04-16 ENCOUNTER — TELEPHONE (OUTPATIENT)
Dept: FAMILY MEDICINE CLINIC | Age: 70
End: 2020-04-16

## 2020-04-16 NOTE — TELEPHONE ENCOUNTER
Patient called and stated her granddaughter was at her house and became sick and may have COVID 23. She is now has a cough with green mucus denies having SOB, fever she is not sleeping. Her symptoms started on Sunday she wants to know what she should do.  Please advise

## 2020-04-17 NOTE — TELEPHONE ENCOUNTER
Called and advised patient to go to the flu clinic to be evaluated and tested for the COVID 19 patient verbalized understanding

## 2020-05-19 RX ORDER — TRAMADOL HYDROCHLORIDE 50 MG/1
50 TABLET ORAL EVERY 8 HOURS PRN
Qty: 90 TABLET | Refills: 0 | Status: SHIPPED | OUTPATIENT
Start: 2020-05-19 | End: 2020-07-17 | Stop reason: SDUPTHER

## 2020-05-19 RX ORDER — BUPROPION HYDROCHLORIDE 300 MG/1
TABLET ORAL
Qty: 30 TABLET | Refills: 1 | Status: SHIPPED | OUTPATIENT
Start: 2020-05-19 | End: 2020-09-03

## 2020-07-10 RX ORDER — UMECLIDINIUM BROMIDE AND VILANTEROL TRIFENATATE 62.5; 25 UG/1; UG/1
1 POWDER RESPIRATORY (INHALATION) DAILY
Qty: 1 EACH | Refills: 5 | Status: SHIPPED | OUTPATIENT
Start: 2020-07-10 | End: 2020-10-01 | Stop reason: SDUPTHER

## 2020-07-14 ENCOUNTER — TELEPHONE (OUTPATIENT)
Dept: FAMILY MEDICINE CLINIC | Age: 70
End: 2020-07-14

## 2020-07-14 NOTE — TELEPHONE ENCOUNTER
Spoke with patient this morning about getting her Mammogram and she would like an order put in. She didn't want to schedule right now due to her breathing issues but I gave her Central Sched[facility]'s number and told her she could call and schedule there. She also stated that she would like a refill on her Tramadol because her hands and back have been hurting like crazy. Please advise.

## 2020-07-15 NOTE — TELEPHONE ENCOUNTER
Called patient to inform her of what PCP stated, patient gave a verbal understanding and we scheduled her for Friday July 17th @ 3:40pm

## 2020-07-17 ENCOUNTER — OFFICE VISIT (OUTPATIENT)
Dept: FAMILY MEDICINE CLINIC | Age: 70
End: 2020-07-17
Payer: MEDICARE

## 2020-07-17 VITALS
TEMPERATURE: 97.1 F | HEIGHT: 60 IN | OXYGEN SATURATION: 94 % | DIASTOLIC BLOOD PRESSURE: 68 MMHG | SYSTOLIC BLOOD PRESSURE: 120 MMHG | RESPIRATION RATE: 16 BRPM | BODY MASS INDEX: 26.01 KG/M2 | HEART RATE: 58 BPM | WEIGHT: 132.5 LBS

## 2020-07-17 PROCEDURE — 3017F COLORECTAL CA SCREEN DOC REV: CPT | Performed by: NURSE PRACTITIONER

## 2020-07-17 PROCEDURE — 3288F FALL RISK ASSESSMENT DOCD: CPT | Performed by: NURSE PRACTITIONER

## 2020-07-17 PROCEDURE — G8417 CALC BMI ABV UP PARAM F/U: HCPCS | Performed by: NURSE PRACTITIONER

## 2020-07-17 PROCEDURE — 1090F PRES/ABSN URINE INCON ASSESS: CPT | Performed by: NURSE PRACTITIONER

## 2020-07-17 PROCEDURE — 1036F TOBACCO NON-USER: CPT | Performed by: NURSE PRACTITIONER

## 2020-07-17 PROCEDURE — 1123F ACP DISCUSS/DSCN MKR DOCD: CPT | Performed by: NURSE PRACTITIONER

## 2020-07-17 PROCEDURE — 99214 OFFICE O/P EST MOD 30 MIN: CPT | Performed by: NURSE PRACTITIONER

## 2020-07-17 PROCEDURE — G8431 POS CLIN DEPRES SCRN F/U DOC: HCPCS | Performed by: NURSE PRACTITIONER

## 2020-07-17 PROCEDURE — G8427 DOCREV CUR MEDS BY ELIG CLIN: HCPCS | Performed by: NURSE PRACTITIONER

## 2020-07-17 PROCEDURE — 4040F PNEUMOC VAC/ADMIN/RCVD: CPT | Performed by: NURSE PRACTITIONER

## 2020-07-17 PROCEDURE — G8400 PT W/DXA NO RESULTS DOC: HCPCS | Performed by: NURSE PRACTITIONER

## 2020-07-17 RX ORDER — AMOXICILLIN 500 MG/1
500 CAPSULE ORAL 3 TIMES DAILY
Qty: 30 CAPSULE | Refills: 0 | Status: SHIPPED | OUTPATIENT
Start: 2020-07-17 | End: 2020-07-27

## 2020-07-17 RX ORDER — TRAMADOL HYDROCHLORIDE 50 MG/1
50 TABLET ORAL EVERY 8 HOURS PRN
Qty: 90 TABLET | Refills: 0 | Status: SHIPPED | OUTPATIENT
Start: 2020-07-17 | End: 2020-10-01 | Stop reason: SDUPTHER

## 2020-07-17 RX ORDER — BUSPIRONE HYDROCHLORIDE 5 MG/1
5 TABLET ORAL 2 TIMES DAILY
Qty: 60 TABLET | Refills: 0 | Status: SHIPPED | OUTPATIENT
Start: 2020-07-17 | End: 2020-08-16

## 2020-07-17 ASSESSMENT — PATIENT HEALTH QUESTIONNAIRE - PHQ9
SUM OF ALL RESPONSES TO PHQ QUESTIONS 1-9: 2
1. LITTLE INTEREST OR PLEASURE IN DOING THINGS: 0
2. FEELING DOWN, DEPRESSED OR HOPELESS: 2
SUM OF ALL RESPONSES TO PHQ QUESTIONS 1-9: 2
SUM OF ALL RESPONSES TO PHQ9 QUESTIONS 1 & 2: 2

## 2020-07-17 NOTE — PROGRESS NOTES
7/19/20    Chief Complaint   Patient presents with    Medication Check     Pt is here for a medication follow up// ears feel clogged    Depression     Worries alot and thinks her Well-Butrin is not working as well as it use to       [facility], (1950), is a 79 y.o. female, is here for evaluation of the following medical concerns:    HPI    Depression:  Feels the Wellbutrin is not helping as well as in the past. Worrying more about \"everything from the girls to the virus and keeping us all healthy\". Not sleeping well due to anxiety. Denies SI or HI    Previus Note:  She started Wellbutrin 150 mg once daily 3 weeks ago and feels it is helping. \"I feel that I am dealing with my stress better and I have been able to distance people out of my life that cannot be there right now\". She is not tearful and emotionally distraught as during her previous visit. She is scheduled to see Dr. Ritesh Vera tomorrow. Chronic midline low back pain without sciatica  States medication is helping pain, using tramadol PRN. Taking this medication as needed allows her to remain functional and provide care for her grandchildren. Having spasms 2-3 times per day    Ear fullness and right side of head tingling:  Rifht side of head has been tingling \"like pins an needles for 2 weeks, denies head injury, weakness, facial droop, slurred speech. Happening intermittently in the morning when she wakes up it is worse. She also feels that she has a lot of ear fullness and pressure, more on right that left with ear drainage from right ear. She did have an accident with my grasshopper mower, and \"I jolted neck several weeks back\". Denies hitting head. Right shoulder pain  Never goes completely away    Review of Systems   Constitutional: Negative for activity change, appetite change, chills, diaphoresis, fatigue, fever and unexpected weight change.    HENT: Negative for congestion, dental problem, ear pain, hearing loss, mouth sores, nosebleeds, postnasal drip, rhinorrhea, sinus pressure, sinus pain, sore throat, tinnitus and trouble swallowing. Eyes: Negative for photophobia, pain and visual disturbance. Respiratory: Negative for cough, chest tightness, shortness of breath and wheezing. Cardiovascular: Negative for chest pain, palpitations and leg swelling. Gastrointestinal: Negative for abdominal pain, blood in stool, constipation, diarrhea, nausea and vomiting. Endocrine: Negative for cold intolerance, heat intolerance, polydipsia and polyuria. Genitourinary: Negative for difficulty urinating, dysuria, flank pain, frequency, hematuria and urgency. Musculoskeletal: Positive for arthralgias, back pain, myalgias and neck pain. Negative for gait problem, joint swelling and neck stiffness. Skin: Negative for pallor, rash and wound. Allergic/Immunologic: Negative for environmental allergies, food allergies and immunocompromised state. Neurological: Negative for dizziness, syncope, weakness, light-headedness, numbness and headaches. Tingling, pins and needles right side of scalp   Hematological: Negative for adenopathy. Does not bruise/bleed easily. Psychiatric/Behavioral: Positive for dysphoric mood and sleep disturbance. Negative for confusion, decreased concentration, self-injury and suicidal ideas. The patient is nervous/anxious. Prior to Visit Medications    Medication Sig Taking? Authorizing Provider   umeclidinium-vilanterol (ANORO ELLIPTA) 62.5-25 MCG/INH AEPB inhaler Inhale 1 puff into the lungs daily Yes LAMBERT Benton CNP   buPROPion (WELLBUTRIN XL) 300 MG extended release tablet TAKE ONE TABLET BY MOUTH IN THE MORNING Yes LAMBERT Benton CNP   albuterol sulfate HFA (VENTOLIN HFA) 108 (90 Base) MCG/ACT inhaler Inhale 2 puffs into the lungs every 6 hours as needed for Wheezing Yes LAMBERT Benton CNP   traZODone (DESYREL) 100 MG tablet Take 1-2 at night as needed for sleep.  Yes Sharion Hussar, APRN - CNP   ipratropium-albuterol (DUONEB) 0.5-2.5 (3) MG/3ML SOLN nebulizer solution Take 3 mLs by nebulization 4 times daily Yes Krystal Silva MD   montelukast (SINGULAIR) 10 MG tablet TAKE ONE TABLET BY MOUTH ONCE DAILY  Patient not taking: Reported on 2020  LAMBERT Teresa CNP   theophylline (SARA-24) 100 MG extended release capsule TAKE ONE CAPSULE BY MOUTH TWICE DAILY  Patient not taking: Reported on 2020  LAMBERT Teresa CNP   tiZANidine (ZANAFLEX) 4 MG tablet Take 1 tablet by mouth every 8 hours as needed (muscle spasm)  Patient not taking: Reported on 2020  LAMBERT Teresa CNP   UNABLE TO FIND Zaki shoe- firm sole. Fit to bottom of foot. Right foot  Patient not taking: Reported on 2020  LAMBERT Teresa CNP   ibuprofen (ADVIL;MOTRIN) 600 MG tablet Take 1 tablet by mouth every 8 hours as needed for Pain  Patient not taking: Reported on 2020  LAMBERT Teresa CNP   acetaminophen (TYLENOL) 325 MG tablet Take 2 tablets by mouth every 4 hours as needed for Pain or Fever  Patient not taking: Reported on 2020  Rosario Salinas MD      Past Medical History:   Diagnosis Date    Asthma     Back ache     reason for taking tramadol    COPD (chronic obstructive pulmonary disease) ([facility] [address]     H/O echocardiogram 2020    EF 50-55%. Sclerotic, but non-stenotic aortic valve. Moderate AR. Mild TR, MR.     History of nuclear stress test 2020    EF 75%. This is a normal study.       Past Surgical History:   Procedure Laterality Date    APPENDECTOMY      HYSTERECTOMY      MIDDLE EAR SURGERY        Social History     Tobacco Use    Smoking status: Former Smoker     Packs/day: 1.00     Years: 30.00     Pack years: 30.00     Last attempt to quit: 2015     Years since quittin.5    Smokeless tobacco: Never Used   Substance Use Topics    Alcohol use: No    Drug use: No        Lab Results   Component Value Date    WBC 8.0 05/07/2019    HGB 14.1 05/07/2019    HCT 42.5 05/07/2019    MCV 97.3 05/07/2019     05/07/2019     No results found for: LABA1C  No results found for: TSHFT4, TSH  Lab Results   Component Value Date    CHOL 234 (H) 03/06/2018    TRIG 79 03/06/2018     (H) 03/06/2018    LDLCALC 118 (H) 03/06/2018    LABVLDL 16 03/06/2018       Wt Readings from Last 3 Encounters:   07/17/20 132 lb 8 oz (60.1 kg)   03/04/20 136 lb (61.7 kg)   02/17/20 138 lb 6.4 oz (62.8 kg)     . FLOWAMB[6   BP Readings from Last 3 Encounters:   07/17/20 120/68   03/04/20 134/82   02/17/20 123/60     Pulse Readings from Last 3 Encounters:   07/17/20 58   03/04/20 76   02/17/20 71        No results found for this visit on 07/17/20. Physical Exam  Vitals signs and nursing note reviewed. Constitutional:       General: She is not in acute distress. Appearance: Normal appearance. She is not ill-appearing, toxic-appearing or diaphoretic. HENT:      Head: Normocephalic and atraumatic. Right Ear: Tympanic membrane, ear canal and external ear normal.      Left Ear: Tympanic membrane, ear canal and external ear normal.      Nose: Nose normal. No congestion or rhinorrhea. Mouth/Throat:      Mouth: Mucous membranes are moist.      Pharynx: Oropharynx is clear. No oropharyngeal exudate or posterior oropharyngeal erythema. Eyes:      Extraocular Movements: Extraocular movements intact. Conjunctiva/sclera: Conjunctivae normal.      Pupils: Pupils are equal, round, and reactive to light. Neck:      Musculoskeletal: Normal range of motion and neck supple. No neck rigidity or muscular tenderness. Cardiovascular:      Rate and Rhythm: Normal rate and regular rhythm. Pulses: Normal pulses. Heart sounds: Normal heart sounds. Pulmonary:      Effort: Pulmonary effort is normal. No respiratory distress. Breath sounds: Normal breath sounds. No stridor. No wheezing, rhonchi or rales.    Chest:      Chest wall: No tenderness. Abdominal:      General: Abdomen is flat. Bowel sounds are normal. There is no distension. Palpations: Abdomen is soft. There is no mass. Tenderness: There is no abdominal tenderness. There is no right CVA tenderness, left CVA tenderness or guarding. Hernia: No hernia is present. Musculoskeletal: Normal range of motion. General: No swelling or tenderness. Right lower leg: No edema. Left lower leg: No edema. Lymphadenopathy:      Cervical: No cervical adenopathy. Skin:     General: Skin is warm and dry. Capillary Refill: Capillary refill takes less than 2 seconds. Coloration: Skin is not pale. Findings: No bruising, erythema, lesion or rash. Neurological:      General: No focal deficit present. Mental Status: She is alert and oriented to person, place, and time. Motor: No weakness. Coordination: Coordination normal.      Gait: Gait normal.   Psychiatric:         Attention and Perception: Attention and perception normal.         Mood and Affect: Affect normal. Mood is anxious. Mood is not depressed. Speech: Speech normal.         Behavior: Behavior normal. Behavior is cooperative. Thought Content: Thought content normal. Thought content is not paranoid or delusional. Thought content does not include homicidal or suicidal ideation. Thought content does not include homicidal or suicidal plan. Judgment: Judgment normal.       PHQ Scores 7/17/2020 2/17/2020 1/28/2020 1/23/2019 10/31/2018 3/6/2018   PHQ2 Score 2 2 3 2 2 0   PHQ9 Score 2 2 15 2 2 0     Interpretation of Total Score Depression Severity: 1-4 = Minimal depression, 5-9 = Mild depression, 10-14 = Moderate depression, 15-19 = Moderately severe depression, 20-27 = Severe depression    ASSESSMENT AND PLAN:    1. Ear pain, right  Mastoid tenderness with pins and needle sensation to right scalp area. No rash or lesion noted. Denies fever, chills, body aches.

## 2020-07-19 ASSESSMENT — ENCOUNTER SYMPTOMS
BLOOD IN STOOL: 0
BACK PAIN: 1
EYE PAIN: 0
TROUBLE SWALLOWING: 0
RHINORRHEA: 0
COUGH: 0
DIARRHEA: 0
PHOTOPHOBIA: 0
VOMITING: 0
CHEST TIGHTNESS: 0
CONSTIPATION: 0
SINUS PAIN: 0
SORE THROAT: 0
SHORTNESS OF BREATH: 0
ABDOMINAL PAIN: 0
WHEEZING: 0
SINUS PRESSURE: 0
NAUSEA: 0

## 2020-07-21 ENCOUNTER — HOSPITAL ENCOUNTER (OUTPATIENT)
Dept: CT IMAGING | Age: 70
Discharge: HOME | End: 2020-07-21
Payer: MEDICARE

## 2020-07-21 PROCEDURE — 70450 CT HEAD/BRAIN W/O DYE: CPT

## 2020-09-03 RX ORDER — BUPROPION HYDROCHLORIDE 300 MG/1
TABLET ORAL
Qty: 30 TABLET | Refills: 1 | Status: SHIPPED | OUTPATIENT
Start: 2020-09-03 | End: 2020-10-26 | Stop reason: SDUPTHER

## 2020-09-29 ENCOUNTER — TELEPHONE (OUTPATIENT)
Dept: FAMILY MEDICINE CLINIC | Age: 70
End: 2020-09-29

## 2020-09-29 NOTE — TELEPHONE ENCOUNTER
Called and spoke with pt who advised does not want to move forward with the referral for  due to feeling a lot better. Referral closed.

## 2020-10-01 ENCOUNTER — OFFICE VISIT (OUTPATIENT)
Dept: FAMILY MEDICINE CLINIC | Age: 70
End: 2020-10-01
Payer: MEDICARE

## 2020-10-01 VITALS
TEMPERATURE: 96.6 F | RESPIRATION RATE: 16 BRPM | OXYGEN SATURATION: 95 % | BODY MASS INDEX: 27.04 KG/M2 | HEIGHT: 59 IN | WEIGHT: 134.13 LBS | SYSTOLIC BLOOD PRESSURE: 112 MMHG | DIASTOLIC BLOOD PRESSURE: 70 MMHG | HEART RATE: 75 BPM

## 2020-10-01 PROCEDURE — 3017F COLORECTAL CA SCREEN DOC REV: CPT | Performed by: NURSE PRACTITIONER

## 2020-10-01 PROCEDURE — G8417 CALC BMI ABV UP PARAM F/U: HCPCS | Performed by: NURSE PRACTITIONER

## 2020-10-01 PROCEDURE — G8427 DOCREV CUR MEDS BY ELIG CLIN: HCPCS | Performed by: NURSE PRACTITIONER

## 2020-10-01 PROCEDURE — 90686 IIV4 VACC NO PRSV 0.5 ML IM: CPT | Performed by: NURSE PRACTITIONER

## 2020-10-01 PROCEDURE — 4040F PNEUMOC VAC/ADMIN/RCVD: CPT | Performed by: NURSE PRACTITIONER

## 2020-10-01 PROCEDURE — 1123F ACP DISCUSS/DSCN MKR DOCD: CPT | Performed by: NURSE PRACTITIONER

## 2020-10-01 PROCEDURE — G8400 PT W/DXA NO RESULTS DOC: HCPCS | Performed by: NURSE PRACTITIONER

## 2020-10-01 PROCEDURE — 1036F TOBACCO NON-USER: CPT | Performed by: NURSE PRACTITIONER

## 2020-10-01 PROCEDURE — 3023F SPIROM DOC REV: CPT | Performed by: NURSE PRACTITIONER

## 2020-10-01 PROCEDURE — G8926 SPIRO NO PERF OR DOC: HCPCS | Performed by: NURSE PRACTITIONER

## 2020-10-01 PROCEDURE — 99214 OFFICE O/P EST MOD 30 MIN: CPT | Performed by: NURSE PRACTITIONER

## 2020-10-01 PROCEDURE — G0008 ADMIN INFLUENZA VIRUS VAC: HCPCS | Performed by: NURSE PRACTITIONER

## 2020-10-01 PROCEDURE — G8482 FLU IMMUNIZE ORDER/ADMIN: HCPCS | Performed by: NURSE PRACTITIONER

## 2020-10-01 PROCEDURE — 1090F PRES/ABSN URINE INCON ASSESS: CPT | Performed by: NURSE PRACTITIONER

## 2020-10-01 RX ORDER — UMECLIDINIUM BROMIDE AND VILANTEROL TRIFENATATE 62.5; 25 UG/1; UG/1
1 POWDER RESPIRATORY (INHALATION) DAILY
Qty: 1 EACH | Refills: 5 | Status: SHIPPED | OUTPATIENT
Start: 2020-10-01 | End: 2020-10-26 | Stop reason: SDUPTHER

## 2020-10-01 RX ORDER — TRAMADOL HYDROCHLORIDE 50 MG/1
50 TABLET ORAL EVERY 8 HOURS PRN
Qty: 90 TABLET | Refills: 0 | Status: SHIPPED | OUTPATIENT
Start: 2020-10-01 | End: 2020-11-23

## 2020-10-01 ASSESSMENT — PATIENT HEALTH QUESTIONNAIRE - PHQ9
SUM OF ALL RESPONSES TO PHQ QUESTIONS 1-9: 2
2. FEELING DOWN, DEPRESSED OR HOPELESS: 1
SUM OF ALL RESPONSES TO PHQ9 QUESTIONS 1 & 2: 2
SUM OF ALL RESPONSES TO PHQ QUESTIONS 1-9: 2
1. LITTLE INTEREST OR PLEASURE IN DOING THINGS: 1

## 2020-10-01 NOTE — PROGRESS NOTES
10/4/20    Chief Complaint   Patient presents with    Medication Check     Pt is here for a follow up on medications    Insomnia     Having trouble sleeping       Jackie Lopez, (1950), is a 79 y.o. female, is here for evaluation of the following medical concerns:    HPI    Eye Exam:  Eye exam completed last month, no vision concerns. Blue ring, large pupil     Sleep disturbance:  Having increased issues with sleeping, difficulty falling asleep. She does drink coffee up until 5 pm.  Not much screen time. Depression:  She feels well controlled today despite her grandson getting out to prison and causing increased stress. Still raising her great-granddaughters. Denies SI or HI    Previous note 7/2020:  Feels the Wellbutrin is not helping as well as in the past. Worrying more about \"everything from the girls to the virus and keeping us all healthy\". Not sleeping well due to anxiety. Denies SI or HI     Previus Note:  She started Wellbutrin 150 mg once daily 3 weeks ago and feels it is helping.  \"I feel that I am dealing with my stress better and I have been able to distance people out of my life that cannot be there right now\".  She is not tearful and emotionally distraught as during her previous visit.  She is scheduled to see Dr. Phillip Brice tomorrow.      Chronic midline low back pain without sciatica  States medication is helping pain, using tramadol PRN. Taking this medication as needed allows her to remain functional and provide care for her grandchildren.  Having spasms 2-3 times per day       Fill Date ID   Written Drug Qty Days Prescriber Rx # Pharmacy Refill   Daily Dose* Pymt Type      07/18/2020  1   07/17/2020  Tramadol Hcl 50 MG Tablet  90.00  30 Am Bra   [id number]   Joseph ([id number])   0  15.00 MME  [place]   [place]   05/20/2020  1   05/19/2020  Tramadol Hcl 50 MG Tablet  90.00  30 Am Bra   [id number]   Joseph ([id number])   0  15.00 MME  [place]   [place]   02/17/2020  1   02/17/2020  Tramadol Hcl 50 MG Tablet 90.00  30 Am [facility]   [id number]   Joseph ([id number])   0  15.00 MME            Review of Systems   Constitutional: Negative for activity change, appetite change, chills, diaphoresis, fatigue, fever and unexpected weight change. HENT: Negative for congestion, dental problem, ear pain, hearing loss, mouth sores, nosebleeds, postnasal drip, rhinorrhea, sinus pressure, sinus pain, sore throat, tinnitus and trouble swallowing. Eyes: Negative for photophobia, pain and visual disturbance. Respiratory: Negative for cough, chest tightness, shortness of breath and wheezing. Cardiovascular: Negative for chest pain, palpitations and leg swelling. Gastrointestinal: Negative for abdominal pain, blood in stool, constipation, diarrhea, nausea and vomiting. Endocrine: Negative for cold intolerance, heat intolerance, polydipsia and polyuria. Genitourinary: Negative for difficulty urinating, dysuria, flank pain, frequency, hematuria and urgency. Musculoskeletal: Positive for arthralgias and back pain. Negative for gait problem, joint swelling, myalgias, neck pain and neck stiffness. Skin: Negative for pallor, rash and wound. Allergic/Immunologic: Negative for environmental allergies, food allergies and immunocompromised state. Neurological: Negative for dizziness, syncope, weakness, light-headedness, numbness and headaches. Hematological: Negative for adenopathy. Does not bruise/bleed easily. Psychiatric/Behavioral: Positive for sleep disturbance. Negative for confusion, decreased concentration, self-injury and suicidal ideas. The patient is not nervous/anxious. Prior to Visit Medications    Medication Sig Taking?  Authorizing Provider   buPROPion (WELLBUTRIN XL) 300 MG extended release tablet TAKE ONE TABLET BY MOUTH IN THE MORNING  LAMBERT Valerio CNP   umeclidinium-vilanterol (ANORO ELLIPTA) 62.5-25 MCG/INH AEPB inhaler Inhale 1 puff into the lungs daily  LAMBERT Valerio CNP   albuterol sulfate HFA (VENTOLIN HFA) 108 (90 Base) MCG/ACT inhaler Inhale 2 puffs into the lungs every 6 hours as needed for Wheezing  Si Ala, APRN - CNP   traZODone (DESYREL) 100 MG tablet Take 1-2 at night as needed for sleep. Si Ala, APRN - MORIAH   montelukast (SINGULAIR) 10 MG tablet TAKE ONE TABLET BY MOUTH ONCE DAILY  Patient not taking: Reported on 7/17/2020  Tracy CloudLAMBERT CNP   theophylline (SARA-24) 100 MG extended release capsule TAKE ONE CAPSULE BY MOUTH TWICE DAILY  Patient not taking: Reported on 7/17/2020  Tracy Cloud, APRN - CNP   tiZANidine (ZANAFLEX) 4 MG tablet Take 1 tablet by mouth every 8 hours as needed (muscle spasm)  Patient not taking: Reported on 7/17/2020  Letymansi LAMBERT Cloud CNP   UNABLE TO FIND Zaki shoe- firm sole. Fit to bottom of foot. Right foot  Patient not taking: Reported on 7/17/2020  Tracy Bazanden, APRN - CNP   ibuprofen (ADVIL;MOTRIN) 600 MG tablet Take 1 tablet by mouth every 8 hours as needed for Pain  Patient not taking: Reported on 7/17/2020  Tracy BazanLAMBERT roy CNP   ipratropium-albuterol (DUONEB) 0.5-2.5 (3) MG/3ML SOLN nebulizer solution Take 3 mLs by nebulization 4 times daily  Peter Chun MD   acetaminophen (TYLENOL) 325 MG tablet Take 2 tablets by mouth every 4 hours as needed for Pain or Fever  Patient not taking: Reported on 7/17/2020  Rosaline Jackson MD      Past Medical History:   Diagnosis Date    Asthma     Back ache     reason for taking tramadol    COPD [facility]chronic obstructive pulmonary disease) ([facility] [address]     H/O echocardiogram 02/18/2020    EF 50-55%. Sclerotic, but non-stenotic aortic valve. Moderate AR. Mild TR, MR.     History of nuclear stress test 02/18/2020    EF 75%. This is a normal study.       Past Surgical History:   Procedure Laterality Date    APPENDECTOMY      HYSTERECTOMY      MIDDLE EAR SURGERY        Social History     Tobacco Use    Smoking status: Former Smoker     Packs/day: 1.00     Years: 30.00     Pack years: 30.00     Last attempt to quit: 2015     Years since quittin.7    Smokeless tobacco: Never Used   Substance Use Topics    Alcohol use: No    Drug use: No        Lab Results   Component Value Date    WBC 8.0 2019    HGB 14.1 2019    HCT 42.5 2019    MCV 97.3 2019     2019     No results found for: LABA1C  No results found for: TSHFT4, TSH  Lab Results   Component Value Date    CHOL 234 (H) 2018    TRIG 79 2018     (H) 2018    LDLCALC 118 (H) 2018    LABVLDL 16 2018       Wt Readings from Last 3 Encounters:   10/01/20 134 lb 2 oz (60.8 kg)   20 132 lb 8 oz (60.1 kg)   20 136 lb (61.7 kg)     . FLOWAMB[6   BP Readings from Last 3 Encounters:   10/01/20 112/70   20 120/68   20 134/82     Pulse Readings from Last 3 Encounters:   10/01/20 75   20 58   20 76        No results found for this visit on 10/01/20. Physical Exam  Vitals signs and nursing note reviewed. Constitutional:       General: She is not in acute distress. Appearance: Normal appearance. She is not ill-appearing, toxic-appearing or diaphoretic. HENT:      Head: Normocephalic and atraumatic. Right Ear: Tympanic membrane, ear canal and external ear normal.      Left Ear: Tympanic membrane, ear canal and external ear normal.      Nose: Nose normal. No congestion or rhinorrhea. Mouth/Throat:      Mouth: Mucous membranes are moist.      Pharynx: Oropharynx is clear. No oropharyngeal exudate or posterior oropharyngeal erythema. Eyes:      Extraocular Movements: Extraocular movements intact. Conjunctiva/sclera: Conjunctivae normal.      Pupils: Pupils are equal, round, and reactive to light. Neck:      Musculoskeletal: Normal range of motion and neck supple. No neck rigidity or muscular tenderness. Cardiovascular:      Rate and Rhythm: Normal rate and regular rhythm. Pulses: Normal pulses.       Heart sounds: Normal heart sounds. Pulmonary:      Effort: Pulmonary effort is normal. No respiratory distress. Breath sounds: Normal breath sounds. No stridor. No wheezing, rhonchi or rales. Chest:      Chest wall: No tenderness. Abdominal:      General: Abdomen is flat. Bowel sounds are normal. There is no distension. Palpations: Abdomen is soft. There is no mass. Tenderness: There is no abdominal tenderness. There is no right CVA tenderness, left CVA tenderness or guarding. Hernia: No hernia is present. Musculoskeletal: Normal range of motion. General: No swelling or tenderness. Right lower leg: No edema. Left lower leg: No edema. Lymphadenopathy:      Cervical: No cervical adenopathy. Skin:     General: Skin is warm and dry. Capillary Refill: Capillary refill takes less than 2 seconds. Coloration: Skin is not pale. Findings: No bruising, erythema, lesion or rash. Neurological:      General: No focal deficit present. Mental Status: She is alert and oriented to person, place, and time. Motor: No weakness. Coordination: Coordination normal.      Gait: Gait normal.   Psychiatric:         Mood and Affect: Mood normal.         Behavior: Behavior normal.         Thought Content: Thought content normal.         Judgment: Judgment normal.         PHQ Scores 10/1/2020 7/17/2020 2/17/2020 1/28/2020 1/23/2019 10/31/2018 3/6/2018   PHQ2 Score 2 2 2 3 2 2 0   PHQ9 Score 2 2 2 15 2 2 0     Interpretation of Total Score Depression Severity: 1-4 = Minimal depression, 5-9 = Mild depression, 10-14 = Moderate depression, 15-19 = Moderately severe depression, 20-27 = Severe depression    ASSESSMENT AND PLAN:    1. Chronic midline low back pain without sciatica  Stable PRN use. Prescription monitoring reviewed and no suspicious activity noted. - traMADol (ULTRAM) 50 MG tablet;  Take 1 tablet by mouth every 8 hours as needed for Pain (as needed for back pain) for up to 30 days. Dispense: 90 tablet; Refill: 0    2. Moderate COPD (chronic obstructive pulmonary disease) (HCC)  Stable  - umeclidinium-vilanterol (ANORO ELLIPTA) 62.5-25 MCG/INH AEPB inhaler; Inhale 1 puff into the lungs daily  Dispense: 1 each; Refill: 5         Return in about 3 months (around 1/1/2021).     LAMBERT Jose - CNP

## 2020-10-01 NOTE — PROGRESS NOTES
Vaccine Information Sheet, \"Influenza - Inactivated\"  given to Alexis Books, or parent/legal guardian of  Dctio Books and verbalized understanding. Patient responses:    Have you ever had a reaction to a flu vaccine? No  Do you have any current illness? No  Have you ever had Guillian [place] Syndrome? No  Do you have a serious allergy to any of the follow: Neomycin, Polymyxin, Thimerosal, eggs or egg products? No    Flu vaccine given per order. Please see immunization tab. Risks and benefits explained. Current VIS given.       Immunizations Administered     Name Date Dose Route    Influenza, Quadv, IM, PF (6 mo and older Fluzone, Flulaval, Fluarix, and 3 yrs and older Afluria) 10/1/2020 0.5 mL Intramuscular    Site: Deltoid- Right    Lot: [id number]    NDC: 80626-324-42

## 2020-10-04 ASSESSMENT — ENCOUNTER SYMPTOMS
NAUSEA: 0
SHORTNESS OF BREATH: 0
SORE THROAT: 0
SINUS PRESSURE: 0
ABDOMINAL PAIN: 0
CHEST TIGHTNESS: 0
BACK PAIN: 1
TROUBLE SWALLOWING: 0
WHEEZING: 0
COUGH: 0
PHOTOPHOBIA: 0
DIARRHEA: 0
SINUS PAIN: 0
EYE PAIN: 0
CONSTIPATION: 0
BLOOD IN STOOL: 0
RHINORRHEA: 0
VOMITING: 0

## 2020-10-22 RX ORDER — TRAZODONE HYDROCHLORIDE 100 MG/1
TABLET ORAL
Qty: 60 TABLET | Refills: 5 | Status: SHIPPED | OUTPATIENT
Start: 2020-10-22 | End: 2021-04-23 | Stop reason: SDUPTHER

## 2020-10-26 DIAGNOSIS — J44.9 MODERATE COPD (CHRONIC OBSTRUCTIVE PULMONARY DISEASE) (HCC): ICD-10-CM

## 2020-10-26 DIAGNOSIS — F32.A DEPRESSION, UNSPECIFIED DEPRESSION TYPE: ICD-10-CM

## 2020-10-26 RX ORDER — UMECLIDINIUM BROMIDE AND VILANTEROL TRIFENATATE 62.5; 25 UG/1; UG/1
1 POWDER RESPIRATORY (INHALATION) DAILY
Qty: 1 EACH | Refills: 5 | Status: SHIPPED | OUTPATIENT
Start: 2020-10-26 | End: 2020-11-12 | Stop reason: SDUPTHER

## 2020-10-26 RX ORDER — BUPROPION HYDROCHLORIDE 300 MG/1
TABLET ORAL
Qty: 30 TABLET | Refills: 3 | Status: SHIPPED | OUTPATIENT
Start: 2020-10-26 | End: 2020-12-28 | Stop reason: SDUPTHER

## 2020-11-12 ENCOUNTER — OFFICE VISIT (OUTPATIENT)
Dept: FAMILY MEDICINE CLINIC | Age: 70
End: 2020-11-12
Payer: MEDICARE

## 2020-11-12 VITALS
OXYGEN SATURATION: 95 % | HEART RATE: 78 BPM | TEMPERATURE: 96.8 F | RESPIRATION RATE: 16 BRPM | DIASTOLIC BLOOD PRESSURE: 68 MMHG | BODY MASS INDEX: 27.31 KG/M2 | SYSTOLIC BLOOD PRESSURE: 110 MMHG | WEIGHT: 135.2 LBS

## 2020-11-12 PROBLEM — J45.20 MILD INTERMITTENT ASTHMA WITHOUT COMPLICATION: Status: ACTIVE | Noted: 2020-11-12

## 2020-11-12 PROBLEM — Z87.891 PERSONAL HISTORY OF TOBACCO USE: Status: ACTIVE | Noted: 2020-11-12

## 2020-11-12 LAB
A/G RATIO: 1.8 (ref 1.1–2.2)
ALBUMIN SERPL-MCNC: 4.2 G/DL (ref 3.4–5)
ALP BLD-CCNC: 84 U/L (ref 40–129)
ALT SERPL-CCNC: 9 U/L (ref 10–40)
ANION GAP SERPL CALCULATED.3IONS-SCNC: 11 MMOL/L (ref 3–16)
AST SERPL-CCNC: 12 U/L (ref 15–37)
BASOPHILS ABSOLUTE: 0 K/UL (ref 0–0.2)
BASOPHILS RELATIVE PERCENT: 0.5 %
BILIRUB SERPL-MCNC: <0.2 MG/DL (ref 0–1)
BUN BLDV-MCNC: 10 MG/DL (ref 7–20)
CALCIUM SERPL-MCNC: 9.8 MG/DL (ref 8.3–10.6)
CHLORIDE BLD-SCNC: 101 MMOL/L (ref 99–110)
CHOLESTEROL, FASTING: 241 MG/DL (ref 0–199)
CO2: 25 MMOL/L (ref 21–32)
CREAT SERPL-MCNC: 0.6 MG/DL (ref 0.6–1.2)
EOSINOPHILS ABSOLUTE: 0.3 K/UL (ref 0–0.6)
EOSINOPHILS RELATIVE PERCENT: 4.3 %
GFR AFRICAN AMERICAN: >60
GFR NON-AFRICAN AMERICAN: >60
GLOBULIN: 2.4 G/DL
GLUCOSE FASTING: 84 MG/DL (ref 70–99)
HCT VFR BLD CALC: 40.8 % (ref 36–48)
HDLC SERPL-MCNC: 62 MG/DL (ref 40–60)
HEMOGLOBIN: 13.7 G/DL (ref 12–16)
LDL CHOLESTEROL CALCULATED: 155 MG/DL
LYMPHOCYTES ABSOLUTE: 2.5 K/UL (ref 1–5.1)
LYMPHOCYTES RELATIVE PERCENT: 32.5 %
MCH RBC QN AUTO: 32.8 PG (ref 26–34)
MCHC RBC AUTO-ENTMCNC: 33.6 G/DL (ref 31–36)
MCV RBC AUTO: 97.7 FL (ref 80–100)
MONOCYTES ABSOLUTE: 0.6 K/UL (ref 0–1.3)
MONOCYTES RELATIVE PERCENT: 8.2 %
NEUTROPHILS ABSOLUTE: 4.2 K/UL (ref 1.7–7.7)
NEUTROPHILS RELATIVE PERCENT: 54.5 %
PDW BLD-RTO: 13.6 % (ref 12.4–15.4)
PLATELET # BLD: 246 K/UL (ref 135–450)
PMV BLD AUTO: 8.6 FL (ref 5–10.5)
POTASSIUM SERPL-SCNC: 4.3 MMOL/L (ref 3.5–5.1)
RBC # BLD: 4.18 M/UL (ref 4–5.2)
SODIUM BLD-SCNC: 137 MMOL/L (ref 136–145)
TOTAL PROTEIN: 6.6 G/DL (ref 6.4–8.2)
TRIGLYCERIDE, FASTING: 122 MG/DL (ref 0–150)
TSH SERPL DL<=0.05 MIU/L-ACNC: 3.76 UIU/ML (ref 0.27–4.2)
VLDLC SERPL CALC-MCNC: 24 MG/DL
WBC # BLD: 7.8 K/UL (ref 4–11)

## 2020-11-12 PROCEDURE — 3017F COLORECTAL CA SCREEN DOC REV: CPT | Performed by: NURSE PRACTITIONER

## 2020-11-12 PROCEDURE — G8482 FLU IMMUNIZE ORDER/ADMIN: HCPCS | Performed by: NURSE PRACTITIONER

## 2020-11-12 PROCEDURE — 4040F PNEUMOC VAC/ADMIN/RCVD: CPT | Performed by: NURSE PRACTITIONER

## 2020-11-12 PROCEDURE — 1123F ACP DISCUSS/DSCN MKR DOCD: CPT | Performed by: NURSE PRACTITIONER

## 2020-11-12 PROCEDURE — G0438 PPPS, INITIAL VISIT: HCPCS | Performed by: NURSE PRACTITIONER

## 2020-11-12 RX ORDER — UMECLIDINIUM BROMIDE AND VILANTEROL TRIFENATATE 62.5; 25 UG/1; UG/1
1 POWDER RESPIRATORY (INHALATION) DAILY
Qty: 1 EACH | Refills: 5 | Status: SHIPPED | OUTPATIENT
Start: 2020-11-12 | End: 2021-11-08 | Stop reason: SDUPTHER

## 2020-11-12 RX ORDER — ZOSTER VACCINE RECOMBINANT, ADJUVANTED 50 MCG/0.5
0.5 KIT INTRAMUSCULAR SEE ADMIN INSTRUCTIONS
Qty: 0.5 ML | Refills: 0 | Status: SHIPPED | OUTPATIENT
Start: 2020-11-12 | End: 2021-05-11

## 2020-11-12 ASSESSMENT — LIFESTYLE VARIABLES: HOW OFTEN DO YOU HAVE A DRINK CONTAINING ALCOHOL: 0

## 2020-11-12 ASSESSMENT — PATIENT HEALTH QUESTIONNAIRE - PHQ9
SUM OF ALL RESPONSES TO PHQ9 QUESTIONS 1 & 2: 0
SUM OF ALL RESPONSES TO PHQ QUESTIONS 1-9: 0
SUM OF ALL RESPONSES TO PHQ QUESTIONS 1-9: 0
1. LITTLE INTEREST OR PLEASURE IN DOING THINGS: 0
2. FEELING DOWN, DEPRESSED OR HOPELESS: 0
SUM OF ALL RESPONSES TO PHQ QUESTIONS 1-9: 0

## 2020-11-12 NOTE — PATIENT INSTRUCTIONS
Advance Directives: Care Instructions  Overview  An advance directive is a legal way to state your wishes at the end of your life. It tells your family and your doctor what to do if you can't say what you want. There are two main types of advance directives. You can change them any time your wishes change. Living will. This form tells your family and your doctor your wishes about life support and other treatment. The form is also called a declaration. Medical power of . This form lets you name a person to make treatment decisions for you when you can't speak for yourself. This person is called a health care agent (health care proxy, health care surrogate). The form is also called a durable power of  for health care. If you do not have an advance directive, decisions about your medical care may be made by a family member, or by a doctor or a  who doesn't know you. It may help to think of an advance directive as a gift to the people who care for you. If you have one, they won't have to make tough decisions by themselves. Follow-up care is a key part of your treatment and safety. Be sure to make and go to all appointments, and call your doctor if you are having problems. It's also a good idea to know your test results and keep a list of the medicines you take. What should you include in an advance directive? Many states have a unique advance directive form. (It may ask you to address specific issues.) Or you might use a universal form that's approved by many states. If your form doesn't tell you what to address, it may be hard to know what to include in your advance directive. Use the questions below to help you get started. · Who do you want to make decisions about your medical care if you are not able to? · What life-support measures do you want if you have a serious illness that gets worse over time or can't be cured? · What are you most afraid of that might happen? (Maybe you're afraid of having pain, losing your independence, or being kept alive by machines.)  · Where would you prefer to die? (Your home? A hospital? A nursing home?)  · Do you want to donate your organs when you die? · Do you want certain Rastafarian practices performed before you die? When should you call for help? Be sure to contact your doctor if you have any questions. Where can you learn more? Go to [email or web address] org and sign in to your eCareer account. Enter R264 in the [employer] box to learn more about \"Advance Directives: Care Instructions. \"     If you do not have an account, please click on the \"Sign Up Now\" link. Current as of: December 9, 2019               Content Version: 12.6  © 8588-4052 [employer]. Care instructions adapted under license by [facility] ([facility]). If you have questions about a medical condition or this instruction, always ask your healthcare professional. [place] [address] any warranty or liability for your use of this information. Learning About Medical Power of   What is a medical power of ? A medical power of , also called a durable power of  for health care, is one type of the legal forms called advance directives. It lets you name the person you want to make treatment decisions for you if you can't speak or decide for yourself. The person you choose is called your health care agent. This person is also called a health care proxy or health care surrogate. A medical power of  may be called something else in your state. How do you choose a health care agent? Choose your health care agent carefully. This person may or may not be a family member. Talk to the person before you make your final decision. Make sure he or she is comfortable with this responsibility. It's a good idea to choose someone who:  · Is at least 25years old.   · Knows you well and understands what makes life meaningful for you. · Understands your Nondenominational and moral values. · Will do what you want, not what he or she wants. · Will be able to make difficult choices at a stressful time. · Will be able to refuse or stop treatment, if that is what you would want, even if you could die. · Will be firm and confident with health professionals if needed. · Will ask questions to get needed information. · Lives near you or agrees to travel to you if needed. Your family may help you make medical decisions while you can still be part of that process. But it's important to choose one person to be your health care agent in case you aren't able to make decisions for yourself. If you don't fill out the legal form and name a health care agent, the decisions your family can make may be limited. A health care agent may be called something else in your state. Who will make decisions for you if you don't have a health care agent? If you don't have a health care agent or a living will, you may not get the care you want. Decisions may be made by family members who disagree about your medical care. Or decisions may be made by a medical professional who doesn't know you well. In some cases, a  makes the decisions. When you name a health care agent, it is very clear who has the power to make health decisions for you. How do you name a health care agent? You name your health care agent on a legal form. This form is usually called a medical power of . Ask your hospital, state bar association, or office on aging where to find these forms. You must sign the form to make it legal. Some states require you to get the form notarized. This means that a person called a  watches you sign the form and then he or she signs the form. Some states also require that two or more witnesses sign the form. Be sure to tell your family members and doctors who your health care agent is.   Where can you learn more? Go to [email or web address]://[email or web address] org and sign in to your [employer] account. Enter [id number] in the [facility] box to learn more about \"Learning About [address]. \"     If you do not have an account, please click on the \"Sign Up Now\" link. Current as of: December 9, 2019               Content Version: 12.6  © 8261-9929 [employer]. Care instructions adapted under license by [facility] ([facility]). If you have questions about a medical condition or this instruction, always ask your healthcare professional. [address] any warranty or liability for your use of this information. Learning About [place]  What is a living will? A living will, also called a declaration, is a legal form. It tells your family and your doctor your wishes when you can't speak for yourself. It's used by the health professionals who will treat you as you near the end of your life or if you get seriously hurt or ill. If you put your wishes in writing, your loved ones and others will know what kind of care you want. They won't need to guess. This can ease your mind and be helpful to others. And you can change or cancel your living will at any time. A living will is not the same as an estate or property will. An estate will explains what you want to happen with your money and property after you die. How do you use it? A living will is used to describe the kinds of treatment or life support you want as you near the end of your life or if you get seriously hurt or ill. Keep these facts in mind about living rodgers. · Your living will is used only if you can't speak or make decisions for yourself. Most often, one or more doctors must certify that you can't speak or decide for yourself before your living will takes effect. · If you get better and can speak for yourself again, you can accept or refuse any treatment.  It doesn't matter what you said in your living will. · Some states may limit your right to refuse treatment in certain cases. For example, you may need to clearly state in your living will that you don't want artificial hydration and nutrition, such as being fed through a tube. Is a living will a legal document? A living will is a legal document. Each state has its own laws about living rodgers. And a living will may be called something else in your state. Here are some things to know about living rodgers. · You don't need an  to complete a living will. But legal advice can be helpful if your state's laws are unclear. It can also help if your health history is complicated or your family can't agree on what should be in your living will. · You can change your living will at any time. Some people find that their wishes about end-of-life care change as their health changes. If you make big changes to your living will, complete a new form. · If you move to another state, make sure that your living will is legal in the state where you now live. In most cases, doctors will respect your wishes even if you have a form from a different state. · You might use a universal form that has been approved by many states. This kind of form can sometimes be filled out and stored online. Your digital copy will then be available wherever you have a connection to the internet. The doctors and nurses who need to treat you can find it right away. · Your state may offer an online registry. This is another place where you can store your living will online. · It's a good idea to get your living will notarized. This means using a person called a  to watch two people sign, or witness, your living will. What should you know when you create a living will? Here are some questions to ask yourself as you make your living will:  · Do you know enough about life support methods that might be used?  If not, talk to your doctor so you know what might be done if you can't breathe on your own, your heart stops, or you can't swallow. · What things would you still want to be able to do after you receive life-support methods? Would you want to be able to walk? To speak? To eat on your own? To live without the help of machines? · Do you want certain Mormon practices performed if you become very ill? · If you have a choice, where do you want to be cared for? In your home? At a hospital or nursing home? · If you have a choice at the end of your life, where would you prefer to die? At home? In a hospital or nursing home? Somewhere else? · Would you prefer to be buried or cremated? · Do you want your organs to be donated after you die? What should you do with your living will? · Make sure that your family members and your health care agent have copies of your living will (also called a declaration). · Give your doctor a copy of your living will. Ask him or her to keep it as part of your medical record. If you have more than one doctor, make sure that each one has a copy. · Put a copy of your living will where it can be easily found. For example, some people may put a copy on their refrigerator door. If you are using a digital copy, be sure your doctor, family members, and health care agent know how to find and access it. Where can you learn more? Go to [email or web address]://[email or web address] org and sign in to your allyve account. Enter Q955 in the [facility] box to learn more about \"Learning About Living [place]. \"     If you do not have an account, please click on the \"Sign Up Now\" link. Current as of: December 9, 2019               Content Version: 12.6  © 6044-5953 [employer]. Care instructions adapted under license by [facility] ([facility]).  If you have questions about a medical condition or this instruction, always ask your healthcare professional. [address] any warranty or liability for your use of this Preventive Plan for Jackie Lopez - 11/12/2020  Medicare offers a range of preventive health benefits. Some of the tests and screenings are paid in full while other may be subject to a deductible, co-insurance, and/or copay. Some of these benefits include a comprehensive review of your medical history including lifestyle, illnesses that may run in your family, and various assessments and screenings as appropriate. After reviewing your medical record and screening and assessments performed today your provider may have ordered immunizations, labs, imaging, and/or referrals for you. A list of these orders (if applicable) as well as your Preventive Care list are included within your After Visit Summary for your review. Other Preventive Recommendations:    · A preventive eye exam performed by an eye specialist is recommended every 1-2 years to screen for glaucoma; cataracts, macular degeneration, and other eye disorders. · A preventive dental visit is recommended every 6 months. · Try to get at least 150 minutes of exercise per week or 10,000 steps per day on a pedometer . · Order or download the FREE \"Exercise & Physical Activity: Your Everyday Guide\" from [employer]. Call 8-947.747.4952 or search [employer] online. · You need 0749-5459 mg of calcium and 9367-4573 IU of vitamin D per day. It is possible to meet your calcium requirement with diet alone, but a vitamin D supplement is usually necessary to meet this goal.  · When exposed to the sun, use a sunscreen that protects against both UVA and UVB radiation with an SPF of 30 or greater. Reapply every 2 to 3 hours or after sweating, drying off with a towel, or swimming. · Always wear a seat belt when traveling in a car. Always wear a helmet when riding a bicycle or motorcycle.

## 2020-11-12 NOTE — PROGRESS NOTES
Medicare Annual Wellness Visit  Name: Mini Aaron Date: 2020   MRN: [id number] Sex: Female   Age: 79 y.o. Ethnicity: Non-/Non    : 1950 Race: Karl Solorio is here for Medicare AWV (Pt here for her AWV; no concerns)    Screenings for behavioral, psychosocial and functional/safety risks, and cognitive dysfunction are all negative except as indicated below. These results, as well as other patient data from the [address] [facility] [address] form, are documented in Flowsheets linked to this Encounter. Colon Health:  Denies changes in bowel habits or blood in stool. \"If it not broke don't fix it\". Declines referral for colonoscopy or home testing with Cologuard     Breast Health:  Seeing OBGYN once per year. Declines mammogram.  Completing monthly exam     Depression:  She feels well controlled today despite her grandson getting out to prison and causing increased stress. Still raising her great-granddaughters. Denies SI or HI. Doing well on 300 mg Wellbutrin      Previous note 2020:  Feels the Wellbutrin is not helping as well as in the past. Worrying more about \"everything from the girls to the virus and keeping us all healthy\".    Not sleeping well due to anxiety. Denies SI or HI     Previus Note:  She started Wellbutrin 150 mg once daily 3 weeks ago and feels it is helping.  \"I feel that I am dealing with my stress better and I have been able to distance people out of my life that cannot be there right now\".  She is not tearful and emotionally distraught as during her previous visit.  She is scheduled to see Dr. Joseph Glez tomorrow.      Chronic midline low back pain without sciatica  Has been going to chiropractor. Muscle relaxer will help  States medication is helping pain, using tramadol PRN.   Taking this medication as needed allows her to remain functional and provide care for her grandchildren. Having spasms 2-3 times per day    Fill Date ID   Written Drug Ginny Medal Days Prescriber Rx # Pharmacy Refill   Daily Dose* Pymt Type      10/01/2020  1   10/01/2020  Tramadol Hcl 50 MG Tablet  90.00  30 Am Bra   [id number]   Joseph ([id number])   0  15.00 MME  [place]   [place]   07/18/2020  1   07/17/2020  Tramadol Hcl 50 MG Tablet  90.00  30 Am Bra   [id number]   Joseph ([id number])   0  15.00 MME  [place]   05/20/2020  1   05/19/2020  Tramadol Hcl 50 MG Tablet  90.00  30 Am Bra   [id number]   Joseph ([id number])   0  15.00 MME  [place]   02/17/2020  1   02/17/2020  Tramadol Hcl 50 MG Tablet  90.00  30 Am Bra   [id number]   Joseph ([id number])   0  15.00 MME  [place]   01/11/2020  1   09/10/2019  Tramadol Hcl 50 MG Tablet  90.00  30 [place]   [id number]   Joseph ([id number])   2  15.00 MME  [place]   10/30/2019  1   09/10/2019  Tramadol Hcl 50 MG Tablet  90.00  30 [place]   [id number]   Joseph ([id number])   1  15.00 MME  [place]are   [place]   10/03/2019  1   09/10/2019  Tramadol Hcl 50 MG Tablet  90.00  30 [place]   [id number]   Joseph            No Known Allergies      Prior to Visit Medications    Medication Sig Taking? Authorizing Provider   umeclidinium-vilanterol (ANORO ELLIPTA) 62.5-25 MCG/INH AEPB inhaler Inhale 1 puff into the lungs daily Yes LAMBERT Gasca CNP   zoster recombinant adjuvanted vaccine [facility]) 50 MCG/0.5ML SUSR injection Inject 0.5 mLs into the muscle See Admin Instructions 1 dose now and repeat in 2-6 months Yes LAMBERT Gasca CNP   buPROPion (WELLBUTRIN XL) 300 MG extended release tablet TAKE ONE TABLET BY MOUTH IN THE MORNING Yes LAMBERT Gasca CNP   traZODone (DESYREL) 100 MG tablet Take 1-2 at night as needed for sleep.  Yes LAMBERT Gasca CNP   albuterol sulfate HFA (VENTOLIN HFA) 108 (90 Base) MCG/ACT inhaler Inhale 2 puffs into the lungs every 6 hours as needed for Wheezing Yes LAMBERT Gasca CNP   tiZANidine (ZANAFLEX) 4 MG tablet Take 1 tablet by mouth every 8 hours as needed (muscle spasm) Yes LAMBERT Wolf CNP   ipratropium-albuterol (DUONEB) 0.5-2.5 (3) MG/3ML SOLN nebulizer solution Take 3 mLs by nebulization 4 times daily Yes Vasu Russo MD   acetaminophen (TYLENOL) 325 MG tablet Take 2 tablets by mouth every 4 hours as needed for Pain or Fever Yes David Mcnamara MD         Past Medical History:   Diagnosis Date    Asthma     Back ache     reason for taking tramadol    COPD (chronic obstructive pulmonary disease) ([address]     H/O echocardiogram 02/18/2020    EF 50-55%. Sclerotic, but non-stenotic aortic valve. Moderate AR. Mild TR, MR.     History of nuclear stress test 02/18/2020    EF 75%. This is a normal study. Past Surgical History:   Procedure Laterality Date    APPENDECTOMY      HYSTERECTOMY      MIDDLE EAR SURGERY           Family History   Problem Relation Age of Onset    Asthma Mother     Diabetes Father     Asthma Sister     Stroke Sister     Cancer Brother     Cancer Maternal Grandmother     Heart Disease Maternal Grandmother     Colon Cancer Maternal Grandfather        CareTeam (Including outside providers/suppliers regularly involved in providing care):   Patient Care Team:  LAMBERT Kolb CNP as PCP - General (Nurse Practitioner)  LAMBERT Kolb CNP as PCP - [facility] Empaneled Provider    Wt Readings from Last 3 Encounters:   11/12/20 135 lb 3.2 oz (61.3 kg)   10/01/20 134 lb 2 oz (60.8 kg)   07/17/20 132 lb 8 oz (60.1 kg)     Vitals:    11/12/20 1109   BP: 110/68   Site: Right Upper Arm   Position: Sitting   Cuff Size: Medium Adult   Pulse: 78   Resp: 16   Temp: 96.8 °F (36 °C)   TempSrc: Temporal   SpO2: 95%   Weight: 135 lb 3.2 oz (61.3 kg)     Body mass index is 27.31 kg/m². Based upon direct observation of the patient, evaluation of cognition reveals recent and remote memory intact.     General Appearance: alert and oriented to person, place and time, well developed and well- nourished, in no acute distress  Skin: warm and dry, no rash or erythema  Head: normocephalic and atraumatic  Eyes: pupils equal, round, and reactive to light, extraocular eye movements intact, conjunctivae normal  ENT: tympanic membrane, external ear and ear canal normal bilaterally, nose without deformity, nasal mucosa and turbinates normal without polyps  Neck: supple and non-tender without mass, no thyromegaly or thyroid nodules, no cervical lymphadenopathy  Pulmonary/Chest: clear to auscultation bilaterally- no wheezes, rales or rhonchi, normal air movement, no respiratory distress  Cardiovascular: normal rate, regular rhythm, normal S1 and S2, no murmurs, rubs, clicks, or gallops, distal pulses intact, no carotid bruits  Abdomen: soft, non-tender, non-distended, normal bowel sounds, no masses or organomegaly  Extremities: no cyanosis, clubbing or edema  Musculoskeletal: normal range of motion, no joint swelling, deformity or tenderness  Neurologic: reflexes normal and symmetric, no cranial nerve deficit, gait, coordination and speech normal    Patient's complete Health Risk Assessment and screening values have been reviewed and are found in Flowsheets. The following problems were reviewed today and where indicated follow up appointments were made and/or referrals ordered. Positive Risk Factor Screenings with Interventions:     Fall Risk:  Timed Up and Go Test > 12 seconds? (Complete if either Fall Risk answers are Yes): no  2 or more falls in past year?: (!) yes  Fall with injury in past year?: no  Fall Risk Interventions:    · Home safety tips provided    General Health and ACP:  General  In general, how would you say your health is?: Good  In the past 7 days, have you experienced any of the following?  New or Increased Pain, New or Increased Fatigue, Loneliness, Social Isolation, Stress or Anger?: (!) Stress  Do you get the social and emotional support that you need?: Yes  Do you have a Living Will?: (!) No  Advance Directives     Power of  Living Will ACP-Advance Directive ACP-Power of     Not on File Not on File [id number] [facility] Risk Interventions:  · Stress: relaxation techniques discussed    Health Habits/Nutrition:  Health Habits/Nutrition  Do you exercise for at least 20 minutes 2-3 times per week?: (!) No  Have you lost any weight without trying in the past 3 months?: No  Do you eat fewer than 2 meals per day?: No  Have you seen a dentist within the past year?: Yes     Health Habits/Nutrition Interventions:  · Inadequate physical activity:  patient is not ready to increase his/her physical activity level at this time    Safety:  Safety  Do you have working smoke detectors?: Yes  Have all throw rugs been removed or fastened?: (!) No  Do you have non-slip mats or surfaces in all bathtubs/showers?: Yes  Do all of your stairways have a railing or banister?: Yes  Are your doorways, halls and stairs free of clutter?: Yes  Do you always fasten your seatbelt when you are in a car?: Yes  Safety Interventions:  · Home safety tips provided    Personalized Preventive Plan   Current Health Maintenance Status  Immunization History   Administered Date(s) Administered    Influenza, High Dose (Fluzone 65 yrs and older) 10/18/2015    Influenza, Miroslava Guise, IM, PF (6 mo and older Fluzone, Flulaval, Fluarix, and 3 yrs and older Afluria) 09/28/2018, 09/10/2019, 10/01/2020    Influenza, Triv, 3 Years and older, IM (Afluria (5 yrs and older) 10/01/2017    Pneumococcal Conjugate 13-valent (Villisca Karst) 02/09/2018    Tdap (Boostrix, Adacel) 02/03/2011        Health Maintenance   Topic Date Due    Breast cancer screen  02/01/2000    Shingles Vaccine (1 of 2) 02/01/2000    Colon cancer screen colonoscopy  02/01/2000    DEXA (modify frequency per FRAX score)  02/01/2005    Pneumococcal 65+ years Vaccine (2 of 2 - PPSV23) 02/09/2019    Low dose CT lung screening  02/26/2019    Annual Wellness Visit (AWV)  05/29/2019    DTaP/Tdap/Td vaccine (2 - Td) 02/03/2021    Lipid screen  11/12/2025    Flu vaccine  Completed    Hepatitis C screen  Completed    Hepatitis A vaccine  Aged Out    Hepatitis B vaccine  Aged Out    Hib vaccine  Aged Out    Meningococcal (ACWY) vaccine  Aged Out     Recommendations for Wisr Due: see orders and patient instructions/AVS.  . Recommended screening schedule for the next 5-10 years is provided to the patient in written form: see Patient Instructions/AVS.    PHQ Scores 11/12/2020 10/1/2020 7/17/2020 2/17/2020 1/28/2020 1/23/2019 10/31/2018   PHQ2 Score 0 2 2 2 3 2 2   PHQ9 Score 0 2 2 2 15 2 2     Interpretation of Total Score Depression Severity: 1-4 = Minimal depression, 5-9 = Mild depression, 10-14 = Moderate depression, 15-19 = Moderately severe depression, 20-27 = Severe depression    Lori Mujica was seen today for medicare awv. Diagnoses and all orders for this visit:    Screening for deficiency anemia  -     CBC Auto Differential    Moderate COPD (chronic obstructive pulmonary disease) (HCC)  -     umeclidinium-vilanterol (ANORO ELLIPTA) 62.5-25 MCG/INH AEPB inhaler; Inhale 1 puff into the lungs daily    Personal history of tobacco use  She has declined lung screening at this time. \"if it is not broke, don't fix it\". Extensive education provided on the importance of cancer screening. She verbalizes understanding.    -     Cancel: CT Lung Screening; Future    Asymptomatic menopausal state  -     DEXA Bone Density Axial Skeleton; Future    Encounter for screening mammogram for breast cancer  She has declined a mammogram at this time. \"if it is not broke, don't fix it\". Extensive education provided on the importance of cancer screening. She verbalizes understanding.    -     Cancel: JS Digital Screen Bilateral [[id number]];  Future    Routine general medical examination at a health care facility  Completed     Screening for hyperlipidemia  -     Lipid, Fasting    Screening for thyroid disorder  -     TSH without Reflex    Major depressive disorder with single episode, in partial remission ([address]  She feels stable at this time on current medications. Left adrenal mass [facility])  Upon chart review patient did have a previous CTA of chest through an ED visit in 2018  that showed a left adrenal mass that had recommended follow-up. Shared decision making completed and will order repeat imaging for monitoring.     -     Comprehensive Metabolic Panel, Fasting  -     CT ABDOMEN WO CONTRAST Additional Contrast? None; Future    Mild intermittent asthma without complication  She feels well controlled at this time. Chronic midline low back pain without sciatica  Stable. Ultram for pain control. Denies saddle anesthesia, loss of control of bowel or bladder, numbness/tingling. Other fatigue  Labs today    Need for shingles vaccine  -     zoster recombinant adjuvanted vaccine [facility]) 50 MCG/0.5ML SUSR injection; Inject 0.5 mLs into the muscle See Admin Instructions 1 dose now and repeat in 2-6 months    Screening for malignant neoplasm of colon  Denies changes in bowel habits or blood in stool. \"If it not broke don't fix it\". Declines referral for colonoscopy or home testing with Cologuard           Advance Care Planning   Advanced Care Planning: Discussed the patients choices for care and treatment in case of a health event that adversely affects decision-making abilities. Also discussed the patients long-term treatment options. Reviewed with the patient the [address] [place] [address] of [address] [place] [address] Declaration forms  Reviewed the process of designating a competent adult as an Agent (or -in-fact) that could take make health care decisions for the patient if incompetent. Patient was asked to complete the declaration forms, either acknowledge the forms by a public notary or an eligible witness and provide a signed copy to the practice office.   Time spent (minutes): 10     LDCT Screening: Discussed with patient the benefits and harms of screening, follow-up diagnostic testing, over-diagnosis, false positive rate, and total radiation exposure. Counseled on the importance of adherence to annual lung cancer LDCT screening, impact of comorbidities, ability and willingness to undergo diagnosis and treatment. Counseled on the importance of maintaining cigarette smoking abstinence and cessation. Patient has a history of heavy tobacco use of over 30 pack years. Patient does not present signs or symptoms of lung cancer.

## 2020-11-13 RX ORDER — ATORVASTATIN CALCIUM 10 MG/1
10 TABLET, FILM COATED ORAL DAILY
Qty: 90 TABLET | Refills: 1 | Status: SHIPPED | OUTPATIENT
Start: 2020-11-13

## 2020-11-13 NOTE — RESULT ENCOUNTER NOTE
Lipids are elevated and I would like to start her on a low dose statin to help control along with healthy low fat diet and exercise. Other labs are healthy. Let me know if she is ok with beginning statin.

## 2020-11-23 RX ORDER — TRAMADOL HYDROCHLORIDE 50 MG/1
50 TABLET ORAL EVERY 8 HOURS PRN
Qty: 90 TABLET | Refills: 0 | Status: SHIPPED | OUTPATIENT
Start: 2020-11-23 | End: 2020-12-23

## 2020-12-28 ENCOUNTER — OFFICE VISIT (OUTPATIENT)
Dept: FAMILY MEDICINE CLINIC | Age: 70
End: 2020-12-28
Payer: MEDICARE

## 2020-12-28 VITALS
OXYGEN SATURATION: 97 % | HEIGHT: 59 IN | SYSTOLIC BLOOD PRESSURE: 122 MMHG | BODY MASS INDEX: 27.58 KG/M2 | HEART RATE: 78 BPM | DIASTOLIC BLOOD PRESSURE: 78 MMHG | WEIGHT: 136.8 LBS | RESPIRATION RATE: 16 BRPM | TEMPERATURE: 97.9 F

## 2020-12-28 DIAGNOSIS — Z02.83 ENCOUNTER FOR DRUG SCREENING: ICD-10-CM

## 2020-12-28 LAB
BILIRUBIN, POC: NEGATIVE
BLOOD URINE, POC: NEGATIVE
CLARITY, POC: CLEAR
COLOR, POC: YELLOW
GLUCOSE URINE, POC: NEGATIVE
KETONES, POC: NEGATIVE
LEUKOCYTE EST, POC: NEGATIVE
NITRITE, POC: NEGATIVE
PH, POC: 7
PROTEIN, POC: NEGATIVE
SPECIFIC GRAVITY, POC: 1.02
UROBILINOGEN, POC: 0.2

## 2020-12-28 PROCEDURE — G8427 DOCREV CUR MEDS BY ELIG CLIN: HCPCS | Performed by: NURSE PRACTITIONER

## 2020-12-28 PROCEDURE — G8926 SPIRO NO PERF OR DOC: HCPCS | Performed by: NURSE PRACTITIONER

## 2020-12-28 PROCEDURE — 1090F PRES/ABSN URINE INCON ASSESS: CPT | Performed by: NURSE PRACTITIONER

## 2020-12-28 PROCEDURE — 4040F PNEUMOC VAC/ADMIN/RCVD: CPT | Performed by: NURSE PRACTITIONER

## 2020-12-28 PROCEDURE — G8417 CALC BMI ABV UP PARAM F/U: HCPCS | Performed by: NURSE PRACTITIONER

## 2020-12-28 PROCEDURE — 3023F SPIROM DOC REV: CPT | Performed by: NURSE PRACTITIONER

## 2020-12-28 PROCEDURE — 1123F ACP DISCUSS/DSCN MKR DOCD: CPT | Performed by: NURSE PRACTITIONER

## 2020-12-28 PROCEDURE — 3017F COLORECTAL CA SCREEN DOC REV: CPT | Performed by: NURSE PRACTITIONER

## 2020-12-28 PROCEDURE — 81002 URINALYSIS NONAUTO W/O SCOPE: CPT | Performed by: NURSE PRACTITIONER

## 2020-12-28 PROCEDURE — G8482 FLU IMMUNIZE ORDER/ADMIN: HCPCS | Performed by: NURSE PRACTITIONER

## 2020-12-28 PROCEDURE — G8400 PT W/DXA NO RESULTS DOC: HCPCS | Performed by: NURSE PRACTITIONER

## 2020-12-28 PROCEDURE — 99214 OFFICE O/P EST MOD 30 MIN: CPT | Performed by: NURSE PRACTITIONER

## 2020-12-28 PROCEDURE — 1036F TOBACCO NON-USER: CPT | Performed by: NURSE PRACTITIONER

## 2020-12-28 RX ORDER — BUSPIRONE HYDROCHLORIDE 5 MG/1
5 TABLET ORAL 3 TIMES DAILY
COMMUNITY
End: 2021-03-15 | Stop reason: SDUPTHER

## 2020-12-28 RX ORDER — BUPROPION HYDROCHLORIDE 300 MG/1
TABLET ORAL
Qty: 30 TABLET | Refills: 3 | Status: SHIPPED | OUTPATIENT
Start: 2020-12-28 | End: 2021-03-12 | Stop reason: SDUPTHER

## 2020-12-28 RX ORDER — TRAMADOL HYDROCHLORIDE 50 MG/1
50 TABLET ORAL EVERY MORNING
COMMUNITY
End: 2021-02-12 | Stop reason: SDUPTHER

## 2020-12-28 RX ORDER — ALBUTEROL SULFATE 90 UG/1
2 AEROSOL, METERED RESPIRATORY (INHALATION) EVERY 6 HOURS PRN
Qty: 1 INHALER | Refills: 5 | Status: SHIPPED | OUTPATIENT
Start: 2020-12-28 | End: 2021-09-14 | Stop reason: SDUPTHER

## 2020-12-28 NOTE — PROGRESS NOTES
12/28/20    Chief Complaint   Patient presents with    Other     Patient presents to office for 3 month follow up     Other     pain located right lower back, started a couple of days ago       Lalo Mueller, (1950), is a 79 y.o. female, is here for evaluation of the following medical concerns:    HPI    Lipids are elevated and I would like to start her on a low dose statin to help control along with healthy low fat diet and exercise. Other labs are healthy. Let me know if she is ok with beginning statin. Dysuria:  3 day history of dysuria, right lower back pain. Denies fever, chills, n/v, frequency. Depression:  She feels well controlled today despite her grandson getting out to prison and causing increased stress. Still raising her great-granddaughters who are with her during her appointment today.  Denies SI or HI. Doing well on 300 mg Wellbutrin.      Chronic midline low back pain without sciatica  Wanting to have her Tramadol increased to BID. States that 1 pill per day is not helping her pain. \"Last dose was yesterday morning, did not take today\". Taking Aleve at night to help with pain. Having spasms 2-3 times per day  Denies saddle anesthesia, loss of control of bowel or bladder, numbness/tingling.  Taking this medication as needed allows her to remain functional and provide care for her grandchildren.     Fill Date ID   Written Drug Qty Days Prescriber Rx # Pharmacy Refill   Daily Dose* Pymt Type      11/23/2020  1   11/23/2020  Tramadol Hcl 50 MG Tablet  90.00  30 Am Bra   [id number]   Joseph ([id number])   0  15.00 MME  [place]   10/01/2020  1   10/01/2020  Tramadol Hcl 50 MG Tablet  90.00  30 Am Bra   [id number]   Joseph ([id number])   0  15.00 MME  [place]   07/18/2020  1   07/17/2020  Tramadol Hcl 50 MG Tablet  90.00  30 Am Bra   [id number]   Joseph ([id number])   0  15.00 MME  Comm Ins   Commercial Insurance coverage        COPD:  Last seen by Pulmonology 1 year ago  Not taking the medications they recommended. Worked on breathing techniques and feels that she is able to control her symptoms. She c/o a recent hacking cough, worse at night when laying down. Non-productive. Denies fevers, chills. Hx of Tobacco Use:  Quit 2015    Left Adrenal Mass:  She has not scheduled the CT scan yet that was ordered last month due to her fear of COVID-19. Discussed precautions that the hospital is taking and recommended to schedule. Previous note:  Noted on CT 2/2018, left adrenal gland lesion measuring 2.7 cm. Upon chart review patient did have a previous CTA of chest through an ED visit in 2018  that showed a left adrenal mass that had recommended follow-up. Shared decision making completed and will order repeat imaging for monitoring.   -     Comprehensive Metabolic Panel, Fasting  -     CT ABDOMEN WO CONTRAST Additional Contrast? None; Future    Review of Systems   Constitutional: Negative for activity change, appetite change, chills, diaphoresis, fatigue, fever and unexpected weight change. HENT: Negative for congestion, dental problem, ear pain, hearing loss, mouth sores, nosebleeds, postnasal drip, rhinorrhea, sinus pressure, sinus pain, sore throat, tinnitus and trouble swallowing. Eyes: Negative for photophobia, pain and visual disturbance. Respiratory: Negative for cough, chest tightness, shortness of breath and wheezing. Cardiovascular: Negative for chest pain, palpitations and leg swelling. Gastrointestinal: Negative for abdominal pain, blood in stool, constipation, diarrhea, nausea and vomiting. Endocrine: Negative for cold intolerance, heat intolerance, polydipsia and polyuria. Genitourinary: Negative for difficulty urinating, dysuria, flank pain, frequency, hematuria and urgency. Musculoskeletal: Positive for arthralgias and back pain. Negative for gait problem, joint swelling, myalgias, neck pain and neck stiffness. Skin: Negative for pallor, rash and wound. Allergic/Immunologic: Negative for environmental allergies, food allergies and immunocompromised state. Neurological: Negative for dizziness, syncope, weakness, light-headedness, numbness and headaches. Hematological: Negative for adenopathy. Does not bruise/bleed easily. Psychiatric/Behavioral: Negative for confusion, decreased concentration, dysphoric mood, self-injury, sleep disturbance and suicidal ideas. The patient is not nervous/anxious. Prior to Visit Medications    Medication Sig Taking? Authorizing Provider   atorvastatin (LIPITOR) 10 MG tablet Take 1 tablet by mouth daily  Si Dux, APRN - CNP   umeclidinium-vilanterol (ANORO ELLIPTA) 62.5-25 MCG/INH AEPB inhaler Inhale 1 puff into the lungs daily  Si Dux, APRN - CNP   zoster recombinant adjuvanted vaccine [facility]) 50 MCG/0.5ML SUSR injection Inject 0.5 mLs into the muscle See Admin Instructions 1 dose now and repeat in 2-6 months  Si Dux, APRN - CNP   buPROPion (WELLBUTRIN XL) 300 MG extended release tablet TAKE ONE TABLET BY MOUTH IN THE MORNING  Si Dux, APRN - CNP   traZODone (DESYREL) 100 MG tablet Take 1-2 at night as needed for sleep.   Si Dux, APRN - CNP   albuterol sulfate HFA (VENTOLIN HFA) 108 (90 Base) MCG/ACT inhaler Inhale 2 puffs into the lungs every 6 hours as needed for Wheezing  Si Dux, APRN - CNP   tiZANidine (ZANAFLEX) 4 MG tablet Take 1 tablet by mouth every 8 hours as needed (muscle spasm)  Cricket Solis APRN - MORIAH   ipratropium-albuterol (DUONEB) 0.5-2.5 (3) MG/3ML SOLN nebulizer solution Take 3 mLs by nebulization 4 times daily  Stacey Rolle MD   acetaminophen (TYLENOL) 325 MG tablet Take 2 tablets by mouth every 4 hours as needed for Pain or Fever  Yusuf James MD      Past Medical History:   Diagnosis Date    Asthma     Back ache     reason for taking tramadol    COPD (chronic obstructive pulmonary disease) ([facility] [address]     H/O echocardiogram 02/18/2020    EF oropharyngeal erythema. Eyes:      Extraocular Movements: Extraocular movements intact. Conjunctiva/sclera: Conjunctivae normal.      Pupils: Pupils are equal, round, and reactive to light. Neck:      Musculoskeletal: Normal range of motion and neck supple. No neck rigidity or muscular tenderness. Cardiovascular:      Rate and Rhythm: Normal rate and regular rhythm. Pulses: Normal pulses. Heart sounds: Normal heart sounds. Pulmonary:      Effort: Pulmonary effort is normal. No respiratory distress. Breath sounds: Normal breath sounds. No stridor. No wheezing, rhonchi or rales. Chest:      Chest wall: No tenderness. Abdominal:      General: Abdomen is flat. Bowel sounds are normal. There is no distension. Palpations: Abdomen is soft. There is no mass. Tenderness: There is no abdominal tenderness. There is no right CVA tenderness, left CVA tenderness or guarding. Hernia: No hernia is present. Musculoskeletal: Normal range of motion. General: No swelling or tenderness. Right lower leg: No edema. Left lower leg: No edema. Lymphadenopathy:      Cervical: No cervical adenopathy. Skin:     General: Skin is warm and dry. Capillary Refill: Capillary refill takes less than 2 seconds. Coloration: Skin is not pale. Findings: No bruising, erythema, lesion or rash. Neurological:      General: No focal deficit present. Mental Status: She is alert and oriented to person, place, and time. Motor: No weakness. Coordination: Coordination normal.      Gait: Gait normal.   Psychiatric:         Mood and Affect: Mood normal.         Behavior: Behavior normal.         Thought Content:  Thought content normal.         Judgment: Judgment normal.       PHQ Scores 11/12/2020 10/1/2020 7/17/2020 2/17/2020 1/28/2020 1/23/2019 10/31/2018   PHQ2 Score 0 2 2 2 3 2 2   PHQ9 Score 0 2 2 2 15 2 2     Interpretation of Total Score Depression Severity: 1-4 = Minimal depression, 5-9 = Mild depression, 10-14 = Moderate depression, 15-19 = Moderately severe depression, 20-27 = Severe depression    ASSESSMENT AND PLAN:    1. Depression, unspecified depression type  Well controlled. Refill.   - buPROPion (WELLBUTRIN XL) 300 MG extended release tablet; TAKE ONE TABLET BY MOUTH IN THE MORNING  Dispense: 30 tablet; Refill: 3    2. Moderate COPD (chronic obstructive pulmonary disease) ([facility])  Discussed lung screening due to her smoking history and encouraged her to schedule. - CT lung screen [Initial/Annual]; Future  - albuterol sulfate HFA (VENTOLIN HFA) 108 (90 Base) MCG/ACT inhaler; Inhale 2 puffs into the lungs every 6 hours as needed for Wheezing  Dispense: 1 Inhaler; Refill: 5    3. Encounter for drug screening  POC urine drug screen was inconstant with prescribed medication. Sent for confirmation.   - Drug Panel-PM-HI Res-UR Interp-A; Future    4. History of smoking  Same as above  - CT lung screen [Initial/Annual]; Future    5. Dysuria  UA negative. - Advised patient to push clear, decaffeinated liquids. - F/U immediately if you develop fevers, chills, nausea, vomiting, body aches, or flank pain. - POCT Urinalysis no Micro    6. Left adrenal mass [facility])  Education provided on infection control procedure of the imaging departments and encouraged the patient to schedule for follow up    7. Major depressive disorder with single episode, in partial remission ([address]  Well controlled, refill given    8. Personal history of tobacco use  Same as above    9. Chronic midline low back pain without sciatica  She is requesting an increase in the amount of Tramadol that she is taking per day and would like to take 2 per day. Discussed her fill history and that I do not want to increase her pain medication frequency and that I felt she would benefit from an evaluation from pain management for other procedures to help her pain.     - Amb External Referral To Pain Clinic    10. Screening for malignant neoplasm of colon  Education and encouragement given to complete. - Cologuard (For External Results Only); Future         Return in about 3 months (around 3/28/2021).     LAMBERT Casper - CNP

## 2020-12-30 LAB
ALCOHOL URINE: NORMAL
AMPHETAMINE SCREEN, URINE: NEGATIVE
BARBITURATE SCREEN, URINE: NEGATIVE
BENZODIAZEPINE SCREEN, URINE: NEGATIVE
BUPRENORPHINE URINE: NEGATIVE
COCAINE METABOLITE SCREEN URINE: NEGATIVE
FENTANYL SCREEN, URINE: NORMAL
GABAPENTIN SCREEN, URINE: NORMAL
MDMA URINE: NEGATIVE
METHADONE SCREEN, URINE: NEGATIVE
METHAMPHETAMINE, URINE: NEGATIVE
OPIATE SCREEN URINE: NEGATIVE
OXYCODONE SCREEN URINE: NEGATIVE
PHENCYCLIDINE SCREEN URINE: NORMAL
PROPOXYPHENE SCREEN, URINE: NEGATIVE
SYNTHETIC CANNABINOIDS(K2) SCREEN, URINE: NORMAL
THC SCREEN, URINE: NEGATIVE
TRAMADOL SCREEN URINE: NORMAL
TRICYCLIC ANTIDEPRESSANTS, UR: NORMAL

## 2020-12-30 ASSESSMENT — ENCOUNTER SYMPTOMS
NAUSEA: 0
EYE PAIN: 0
RHINORRHEA: 0
DIARRHEA: 0
ABDOMINAL PAIN: 0
BACK PAIN: 1
CONSTIPATION: 0
CHEST TIGHTNESS: 0
SINUS PRESSURE: 0
SINUS PAIN: 0
COUGH: 0
SHORTNESS OF BREATH: 0
SORE THROAT: 0
VOMITING: 0
BLOOD IN STOOL: 0
TROUBLE SWALLOWING: 0
WHEEZING: 0
PHOTOPHOBIA: 0

## 2020-12-31 LAB
6-ACETYLMORPHINE: NOT DETECTED
7-AMINOCLONAZEPAM: NOT DETECTED
ALPHA-OH-ALPRAZOLAM: NOT DETECTED
ALPRAZOLAM: NOT DETECTED
AMPHETAMINE: NOT DETECTED
BARBITURATES: NOT DETECTED
BENZOYLECGONINE: NOT DETECTED
BUPRENORPHINE: NOT DETECTED
CARISOPRODOL: NOT DETECTED
CLONAZEPAM: NOT DETECTED
CODEINE: NOT DETECTED
CREATININE URINE: 44.7 MG/DL (ref 20–400)
DIAZEPAM: NOT DETECTED
DRUGS EXPECTED: NORMAL
EER PAIN MGT DRUG PANEL, HIGH RES/EMIT U: NORMAL
ETHYL GLUCURONIDE: NOT DETECTED
FENTANYL: NOT DETECTED
HYDROCODONE: NOT DETECTED
HYDROMORPHONE: NOT DETECTED
LORAZEPAM: NOT DETECTED
MARIJUANA METABOLITE: NOT DETECTED
MDA: NOT DETECTED
MDEA: NOT DETECTED
MDMA URINE: NOT DETECTED
MEPERIDINE: NOT DETECTED
METHADONE: NOT DETECTED
METHAMPHETAMINE: NOT DETECTED
METHYLPHENIDATE: NOT DETECTED
MIDAZOLAM: NOT DETECTED
MORPHINE: NOT DETECTED
NORBUPRENORPHINE, FREE: NOT DETECTED
NORDIAZEPAM: NOT DETECTED
NORFENTANYL: NOT DETECTED
NORHYDROCODONE, URINE: NOT DETECTED
NOROXYCODONE: NOT DETECTED
NOROXYMORPHONE, URINE: NOT DETECTED
OXAZEPAM: NOT DETECTED
OXYCODONE: NOT DETECTED
OXYMORPHONE: NOT DETECTED
PAIN MANAGEMENT DRUG PANEL: NORMAL
PAIN MANAGEMENT DRUG PANEL: NORMAL
PCP: NOT DETECTED
PHENTERMINE: NOT DETECTED
PROPOXYPHENE: NOT DETECTED
TAPENTADOL, URINE: NOT DETECTED
TAPENTADOL-O-SULFATE, URINE: NOT DETECTED
TEMAZEPAM: NOT DETECTED
TRAMADOL: NOT DETECTED
ZOLPIDEM: NOT DETECTED

## 2021-02-08 ENCOUNTER — HOSPITAL ENCOUNTER (OUTPATIENT)
Dept: CT IMAGING | Age: 71
Discharge: HOME | End: 2021-02-08
Payer: MEDICARE

## 2021-02-08 DIAGNOSIS — E27.8 LEFT ADRENAL MASS (HCC): ICD-10-CM

## 2021-02-08 PROCEDURE — 74150 CT ABDOMEN W/O CONTRAST: CPT

## 2021-02-12 DIAGNOSIS — M54.50 CHRONIC MIDLINE LOW BACK PAIN WITHOUT SCIATICA: ICD-10-CM

## 2021-02-12 DIAGNOSIS — I71.40 ABDOMINAL AORTIC ANEURYSM (AAA) WITHOUT RUPTURE: Primary | ICD-10-CM

## 2021-02-12 DIAGNOSIS — G89.29 CHRONIC MIDLINE LOW BACK PAIN WITHOUT SCIATICA: ICD-10-CM

## 2021-02-12 RX ORDER — TRAMADOL HYDROCHLORIDE 50 MG/1
50 TABLET ORAL EVERY MORNING
Qty: 90 TABLET | Refills: 0 | Status: SHIPPED | OUTPATIENT
Start: 2021-02-12 | End: 2021-05-13

## 2021-02-12 NOTE — PROGRESS NOTES
The patient wanted to further discuss the Tramadol and is requesting a refill. She does not have to take daily, but due to the colder weather her pain is increasing without the medication. Tylenol and Motrin are not helping. Discussed that we will continue to monitor her and her need for the controlled substance. Denies falls or excessive sleepiness.

## 2021-02-12 NOTE — RESULT ENCOUNTER NOTE
I have called the patient and discussed her results.    -I have placed a referral to Dr. Rikki Waters for further evaluation and monitoring of the 3.8 x 5.2 aortic aneurysm  -Stable adrenal adenoma  -Gall stones noted, denies pain or n/v. Will monitor  -Diverticulosis, denies pain or n/v. Will monitor  -lumbar spondylosis, will continue to monitor.

## 2021-02-25 PROBLEM — I71.40 ABDOMINAL AORTIC ANEURYSM (AAA) WITHOUT RUPTURE: Status: ACTIVE | Noted: 2021-02-25

## 2021-03-08 ENCOUNTER — HOSPITAL ENCOUNTER (OUTPATIENT)
Dept: CT IMAGING | Age: 71
Discharge: HOME | End: 2021-03-08
Payer: MEDICARE

## 2021-03-08 DIAGNOSIS — Z87.891 PERSONAL HISTORY OF TOBACCO USE: ICD-10-CM

## 2021-03-08 PROCEDURE — 71271 CT THORAX LUNG CANCER SCR C-: CPT

## 2021-03-12 DIAGNOSIS — F32.A DEPRESSION, UNSPECIFIED DEPRESSION TYPE: ICD-10-CM

## 2021-03-12 RX ORDER — BUPROPION HYDROCHLORIDE 300 MG/1
TABLET ORAL
Qty: 30 TABLET | Refills: 3 | Status: SHIPPED | OUTPATIENT
Start: 2021-03-12

## 2021-03-19 PROBLEM — R07.89 OTHER CHEST PAIN: Status: ACTIVE | Noted: 2021-03-19

## 2021-03-25 RX ORDER — BUSPIRONE HYDROCHLORIDE 5 MG/1
5 TABLET ORAL 3 TIMES DAILY
Qty: 90 TABLET | Refills: 0 | Status: SHIPPED | OUTPATIENT
Start: 2021-03-25 | End: 2022-08-18 | Stop reason: DRUGHIGH

## 2021-03-26 DIAGNOSIS — F51.01 PRIMARY INSOMNIA: ICD-10-CM

## 2021-04-06 ENCOUNTER — OFFICE VISIT (OUTPATIENT)
Dept: FAMILY MEDICINE CLINIC | Age: 71
End: 2021-04-06
Payer: MEDICARE

## 2021-04-06 VITALS
BODY MASS INDEX: 27.95 KG/M2 | SYSTOLIC BLOOD PRESSURE: 110 MMHG | WEIGHT: 138.4 LBS | OXYGEN SATURATION: 91 % | RESPIRATION RATE: 16 BRPM | DIASTOLIC BLOOD PRESSURE: 60 MMHG | HEART RATE: 72 BPM | TEMPERATURE: 98 F

## 2021-04-06 DIAGNOSIS — J45.21 MILD INTERMITTENT ASTHMA WITH ACUTE EXACERBATION: Primary | ICD-10-CM

## 2021-04-06 DIAGNOSIS — G89.29 CHRONIC MIDLINE LOW BACK PAIN WITHOUT SCIATICA: ICD-10-CM

## 2021-04-06 DIAGNOSIS — M54.50 CHRONIC MIDLINE LOW BACK PAIN WITHOUT SCIATICA: ICD-10-CM

## 2021-04-06 DIAGNOSIS — I71.40 ABDOMINAL AORTIC ANEURYSM (AAA) WITHOUT RUPTURE: ICD-10-CM

## 2021-04-06 DIAGNOSIS — J44.9 CHRONIC OBSTRUCTIVE PULMONARY DISEASE, UNSPECIFIED COPD TYPE (HCC): ICD-10-CM

## 2021-04-06 PROBLEM — J44.1 COPD EXACERBATION (HCC): Status: RESOLVED | Noted: 2018-02-26 | Resolved: 2021-04-06

## 2021-04-06 PROBLEM — Z23 NEEDS FLU SHOT: Status: RESOLVED | Noted: 2018-09-28 | Resolved: 2021-04-06

## 2021-04-06 PROCEDURE — 3023F SPIROM DOC REV: CPT | Performed by: PHYSICIAN ASSISTANT

## 2021-04-06 PROCEDURE — G8427 DOCREV CUR MEDS BY ELIG CLIN: HCPCS | Performed by: PHYSICIAN ASSISTANT

## 2021-04-06 PROCEDURE — 1036F TOBACCO NON-USER: CPT | Performed by: PHYSICIAN ASSISTANT

## 2021-04-06 PROCEDURE — 1090F PRES/ABSN URINE INCON ASSESS: CPT | Performed by: PHYSICIAN ASSISTANT

## 2021-04-06 PROCEDURE — G8417 CALC BMI ABV UP PARAM F/U: HCPCS | Performed by: PHYSICIAN ASSISTANT

## 2021-04-06 PROCEDURE — 4040F PNEUMOC VAC/ADMIN/RCVD: CPT | Performed by: PHYSICIAN ASSISTANT

## 2021-04-06 PROCEDURE — 3017F COLORECTAL CA SCREEN DOC REV: CPT | Performed by: PHYSICIAN ASSISTANT

## 2021-04-06 PROCEDURE — G8400 PT W/DXA NO RESULTS DOC: HCPCS | Performed by: PHYSICIAN ASSISTANT

## 2021-04-06 PROCEDURE — 1123F ACP DISCUSS/DSCN MKR DOCD: CPT | Performed by: PHYSICIAN ASSISTANT

## 2021-04-06 PROCEDURE — G8926 SPIRO NO PERF OR DOC: HCPCS | Performed by: PHYSICIAN ASSISTANT

## 2021-04-06 PROCEDURE — 99214 OFFICE O/P EST MOD 30 MIN: CPT | Performed by: PHYSICIAN ASSISTANT

## 2021-04-06 RX ORDER — BENZONATATE 100 MG/1
100 CAPSULE ORAL 3 TIMES DAILY PRN
Qty: 30 CAPSULE | Refills: 0 | Status: SHIPPED | OUTPATIENT
Start: 2021-04-06 | End: 2021-04-13

## 2021-04-06 RX ORDER — TRAMADOL HYDROCHLORIDE 50 MG/1
50 TABLET ORAL 2 TIMES DAILY PRN
Qty: 60 TABLET | Refills: 2 | Status: SHIPPED | OUTPATIENT
Start: 2021-04-06 | End: 2021-04-09

## 2021-04-06 RX ORDER — PREDNISONE 20 MG/1
20 TABLET ORAL DAILY
Qty: 5 TABLET | Refills: 0 | Status: SHIPPED | OUTPATIENT
Start: 2021-04-06 | End: 2021-04-11

## 2021-04-06 ASSESSMENT — ENCOUNTER SYMPTOMS
ABDOMINAL PAIN: 0
SHORTNESS OF BREATH: 0
COUGH: 1
WHEEZING: 1
BACK PAIN: 1

## 2021-04-06 ASSESSMENT — PATIENT HEALTH QUESTIONNAIRE - PHQ9
SUM OF ALL RESPONSES TO PHQ QUESTIONS 1-9: 2

## 2021-04-06 NOTE — PROGRESS NOTES
Fanta Antunez  1950  70 y.o.  female    SUBJECTIVE:    Chief Complaint   Patient presents with    Follow-up     patient is here for a 3 month follow up    Other     she is requesting her tramadol increased     Cough     has a cough at night when she lays down has tried OTC meds without any relief       HPI  Cough-worse with lying down, seemed to get worse after getting covid vaccine approx a week ago, has noticed increased wheezing. No fever/chills/shortness of breath. Chronic pain-pt reports chronic low back pain/OA herb hands. Pain severe at times. Had been using tramadol qid but over last year has weaned down to one a day. Pt states pain is not controlled at this dose, causes difficulty with ADLs and leading to poorer quality of life due to pain. Pt is raising great grandchildren and pain control allows her to remain functional and able to provide care to great grandchildren    AAA-following with Dr Torsten Sharpe, monitoring for now    COPD-chronic, compliant with meds, stable at this time    [facility] Scores 4/6/2021 11/12/2020 10/1/2020 7/17/2020 2/17/2020 1/28/2020 1/23/2019   PHQ2 Score 2 0 2 2 2 3 2   PHQ9 Score 2 0 2 2 2 15 2     Interpretation of Total Score Depression Severity: 1-4 = Minimal depression, 5-9 = Mild depression, 10-14 = Moderate depression, 15-19 = Moderately severe depression, 20-27 = Severe depression     Current Outpatient Medications on File Prior to Visit   Medication Sig Dispense Refill    busPIRone (BUSPAR) 5 MG tablet Take 1 tablet by mouth 3 times daily 90 tablet 0    buPROPion (WELLBUTRIN XL) 300 MG extended release tablet TAKE ONE TABLET BY MOUTH IN THE MORNING 30 tablet 3    traMADol (ULTRAM) 50 MG tablet Take 1 tablet by mouth every morning for 90 days.  90 tablet 0    albuterol sulfate HFA (VENTOLIN HFA) 108 (90 Base) MCG/ACT inhaler Inhale 2 puffs into the lungs every 6 hours as needed for Wheezing 1 Inhaler 5    atorvastatin (LIPITOR) 10 MG tablet Take 1 tablet by mouth daily 90 tablet 1    umeclidinium-vilanterol (ANORO ELLIPTA) 62.5-25 MCG/INH AEPB inhaler Inhale 1 puff into the lungs daily 1 each 5    traZODone (DESYREL) 100 MG tablet Take 1-2 at night as needed for sleep. 60 tablet 5    tiZANidine (ZANAFLEX) 4 MG tablet Take 1 tablet by mouth every 8 hours as needed (muscle spasm) 40 tablet 0    ipratropium-albuterol (DUONEB) 0.5-2.5 (3) MG/3ML SOLN nebulizer solution Take 3 mLs by nebulization 4 times daily 360 mL 0    zoster recombinant adjuvanted vaccine [facility]) 50 MCG/0.5ML SUSR injection Inject 0.5 mLs into the muscle See Admin Instructions 1 dose now and repeat in 2-6 months (Patient not taking: Reported on 12/28/2020) 0.5 mL 0    acetaminophen (TYLENOL) 325 MG tablet Take 2 tablets by mouth every 4 hours as needed for Pain or Fever (Patient not taking: Reported on 4/6/2021) 120 tablet 3     No current facility-administered medications on file prior to visit. No Known Allergies    Past Medical History:   Diagnosis Date    Aneurysm of abdominal aorta (HCC)     Asthma     Back ache     reason for taking tramadol    COPD [facility]chronic obstructive pulmonary disease) [address] [address]     H/O echocardiogram 02/18/2020    EF 50-55%. Sclerotic, but non-stenotic aortic valve. Moderate AR. Mild TR, MR.     History of nuclear stress test 02/18/2020    EF 75%. This is a normal study.     Needs flu shot 9/28/2018       Past Surgical History:   Procedure Laterality Date    APPENDECTOMY      HYSTERECTOMY      MIDDLE EAR SURGERY         Social History     Socioeconomic History    Marital status: Single     Spouse name: None    Number of children: None    Years of education: None    Highest education level: None   Occupational History    None   Social Needs    Financial resource strain: None    Food insecurity     Worry: None     Inability: None    Transportation needs     Medical: None     Non-medical: None   Tobacco Use    Smoking status: Former Smoker     Packs/day: 1.00     Years: 30.00     Pack years: 30.00     Quit date: 2015     Years since quittin.2    Smokeless tobacco: Never Used   Substance and Sexual Activity    Alcohol use: No    Drug use: No    Sexual activity: Never   Lifestyle    Physical activity     Days per week: None     Minutes per session: None    Stress: None   Relationships    Social connections     Talks on phone: None     Gets together: None     Attends Holiness service: None     Active member of club or organization: None     Attends meetings of clubs or organizations: None     Relationship status: None    Intimate partner violence     Fear of current or ex partner: None     Emotionally abused: None     Physically abused: None     Forced sexual activity: None   Other Topics Concern    None   Social History Narrative    None       Review of Systems   Constitutional: Negative for chills and fever. Respiratory: Positive for cough and wheezing. Negative for shortness of breath. Cardiovascular: Negative for chest pain. Gastrointestinal: Negative for abdominal pain. Musculoskeletal: Positive for arthralgias and back pain. Skin: Negative for rash. Neurological: Negative for dizziness and headaches. Psychiatric/Behavioral: Negative for self-injury. The patient is not nervous/anxious. OBJECTIVE:    /60 (Site: Right Upper Arm, Position: Sitting, Cuff Size: Medium Adult)   Pulse 72   Temp 98 °F (36.7 °C) (Infrared)   Resp 16   Wt 138 lb 6.4 oz (62.8 kg)   SpO2 91%   BMI 27.95 kg/m²     Physical Exam  Vitals signs reviewed. Constitutional:       General: She is not in acute distress. Appearance: Normal appearance. HENT:      Head: Normocephalic. Right Ear: External ear normal.      Left Ear: External ear normal.   Eyes:      Extraocular Movements: Extraocular movements intact. Cardiovascular:      Rate and Rhythm: Normal rate and regular rhythm. Heart sounds: Normal heart sounds.    Pulmonary: Effort: Pulmonary effort is normal.      Breath sounds: Wheezing present. Musculoskeletal:      Lumbar back: She exhibits pain. Skin:     General: Skin is warm and dry. Neurological:      Mental Status: She is alert and oriented to person, place, and time. Psychiatric:         Mood and Affect: Mood normal.         Thought Content: Thought content normal.         Judgment: Judgment normal.         Aria Yeh:    Problem List        Circulatory    Abdominal aortic aneurysm (AAA) without rupture ([facility] [address]     Continue f/u with Dr. Ranjana Villela as scheduled            Respiratory    Mild intermittent asthma without complication - Primary     Steroid burst as directed, use albuterol q 6 hours prn         Relevant Medications    ipratropium-albuterol (DUONEB) 0.5-2.5 (3) MG/3ML SOLN nebulizer solution    umeclidinium-vilanterol (ANORO ELLIPTA) 62.5-25 MCG/INH AEPB inhaler    albuterol sulfate HFA (VENTOLIN HFA) 108 (90 Base) MCG/ACT inhaler    COPD (chronic obstructive pulmonary disease) ([facility])     Continue anoro as directed         Relevant Medications    ipratropium-albuterol (DUONEB) 0.5-2.5 (3) MG/3ML SOLN nebulizer solution    umeclidinium-vilanterol (ANORO ELLIPTA) 62.5-25 MCG/INH AEPB inhaler    albuterol sulfate HFA (VENTOLIN HFA) 108 (90 Base) MCG/ACT inhaler    predniSONE (DELTASONE) 20 MG tablet    benzonatate (TESSALON) 100 MG capsule       Other    Chronic midline low back pain without sciatica     OARRS reviewed today, will increase tramadol back to bid, Risks/benefits/SE reviewed, pt voices understanding  Recheck in 3 months,          Relevant Medications    acetaminophen (TYLENOL) 325 MG tablet    tiZANidine (ZANAFLEX) 4 MG tablet    traMADol (ULTRAM) 50 MG tablet    traMADol (ULTRAM) 50 MG tablet    predniSONE (DELTASONE) 20 MG tablet               Return in about 3 months (around 7/6/2021).

## 2021-04-06 NOTE — LETTER
CONTROLLED SUBSTANCE MEDICATION AGREEMENT     Patient Name: Nan Contreras  Patient YOB: 1950   I understand, that controlled substance medications may be used to help better manage my symptoms and to improve my ability to function at home, work and in social settings. However, I also understand that these medications do have risks, which have been discussed with me, including possible development of physical or psychological dependence. I understand that successful treatment requires mutual trust and honesty between me and my provider. I understand and agree that following this Medication Agreement is necessary in continuing my provider-patient relationship and the success of my treatment plan. Explanation from my Provider: Benefits and Goals of Controlled Substance Medications: There are two potential goals for your treatment: (1) decreased pain and suffering (2) improved daily life functions. There are many possible treatments for your chronic condition(s). Alternatives such as physical therapy, yoga, massage, home daily exercise, meditation, relaxation techniques, injections, chiropractic manipulations, surgery, cognitive therapy, hypnosis and many medications that are not habit-forming may be used. Use of controlled substance medications may be helpful, but they are unlikely to resolve all symptoms or restore all function. Explanation from my Provider: Risks of Controlled Substance Medications:  Opioid pain medications: These medications can lead to problems such as addiction/dependence, sedation, lightheadedness/dizziness, memory issues, falls, constipation, nausea, or vomiting. They may also impair the ability to drive or operate machinery. Additionally, these medications may lower testosterone levels, leading to loss of bone strength, stamina and sex drive.   They may cause problems with breathing, sleep apnea and reduced coughing, which is especially dangerous for patients with lung disease. Overdose or dangerous interactions with alcohol and other medications may occur, leading to death. Hyperalgesia may develop, which means patients receiving opioids for the treatment of pain may become more sensitive to certain painful stimuli, and in some cases, experience pain from ordinarily non-painful stimuli. Women between the ages of 14-53 who could become pregnant should carefully weigh the risks and benefits of opioids with their physicians, as these medications increase the risk of pregnancy complications, including miscarriage,  delivery and stillbirth. It is also possible for babies to be born addicted to opioids. Opioid dependence withdrawal symptoms may include; feelings of uneasiness, increased pain, irritability, belly pain, diarrhea, sweats and goose-flesh. Benzodiazepines and non-benzodiazepine sleep medications: These medications can lead to problems such as addiction/dependence, sedation, fatigue, lightheadedness, dizziness, incoordination, falls, depression, hallucinations, and impaired judgment, memory and concentration. The ability to drive and operate machinery may also be affected. Abnormal sleep-related behaviors have been reported, including sleepwalking, driving, making telephone calls, eating, or having sex while not fully awake. These medications can suppress breathing and worsen sleep apnea, particularly when combined with alcohol or other sedating medications, potentially leading to death. Dependence withdrawal symptoms may include tremors, anxiety, hallucinations and seizures. Stimulants:  Common adverse effects include addiction/dependence, increased blood  pressure and heart rate, decreased appetite, nausea, involuntary weight loss, insomnia,                                                                                                                     Initials:_______   irritability, and headaches.   These risks may increase when these written agreement among other prescribers of controlled substances outlining the responsibility of the medications being prescribed.  I understand that the if the controlled medication is not helping to achieve goals, the dosage may be tapered and no longer prescribed. 3. MY RESPONSIBILITY FOR COMMUNICATION / PRESCRIPTION RENEWALS   I agree that all controlled substance medications that I take will be prescribed only by my provider. If another healthcare provider prescribes me medication in an emergency, I will notify my provider within seventy-two (72) hours.  I will arrange for refills at the prescribed interval ONLY during regular office hours. I will not ask for refills earlier than agreed, after-hours, on holidays or weekends. Refills may take up to 72 hours for processing and prescriptions to reach the pharmacy.  I will inform my other health care providers that I am taking these medications and of the existence of this [id number]. In the event of an emergency, I will provide the same information to the emergency department prescribers.  I will keep my provider updated on the pharmacy I am using for controlled medication prescription filling. Initials:_______  4. MY RESPONSIBILITY FOR PROTECTING MEDICATIONS   I will protect my prescriptions and medications. I understand that lost or misplaced prescriptions will not be replaced.  I will keep medications only for my own use and will not share them with others. I will keep all medications away from children.  I agree that if my medications are adjusted or discontinued, I will properly dispose of any remaining medications. I understand that I will be required to dispose of any remaining controlled medications as, directed by my prescriber, prior to being provided with any prescriptions for other controlled medications.   Medication drop box locations can be found at: HitProtect.dk    5. MY RESPONSIBILITY WITH ILLEGAL DRUGS    I will not use illegal or street drugs or another person's prescription medications not prescribed to me.  If there are identified addiction type symptoms, then referral to a program may be provided by my provider and I agree to follow through with this recommendation. 6. MY RESPONSIBILITY FOR COOPERATION WITH INVESTIGATIONS   I understand that my provider will comply with any applicable law and may discuss my use and/or possible misuse/abuse of controlled substances and alcohol, as appropriate, with any health care provider involved in my care, pharmacist, or legal authority.  I authorize my provider and pharmacy to cooperate fully with law enforcement agencies (as permitted by law) in the investigation of any possible misuse, sale, or other diversion of my controlled substances.  I agree to waive any applicable privilege or right of privacy or confidentiality with respect to these authorizations. 7. PROVIDERS RIGHT TO MONITOR FOR SAFETY: PRESCRIPTION MONITORING / DRUG TESTING   I consent to drug/toxicology screening and will submit to a drug screen upon my providers request to assure I am only taking the prescribed drugs for my safety monitoring. I understand that a drug screen is a laboratory test in which a sample of my urine, blood or saliva is checked to see what drugs I have been taking. This may entail an observed urine specimen, which means that a nurse or other health care provider may watch me provide urine, and I will cooperate if I am asked to provide an observed specimen.  I understand that my provider will check a copy of my State Prescription Monitoring Program () Report in order to safely prescribe medications.  Pill Counts: I consent to pill counts when requested.   I may be asked to bring all my prescribed controlled substance medications, in their original bottles, to all of my scheduled appointments. In addition, my provider may ask me to come to the practice at any time for a random pill count. 8. TERMINATION OF THIS AGREEMENT  For my safety, my prescriber has the right to stop prescribing controlled substance medications and may end this agreement. Initials:_______   Conditions that may result in termination of this agreement:  a. I do not show any improvement in pain, or my activity has not improved. b. I develop rapid tolerance or loss of improvement, as described in my treatment plan.  c. I develop significant side effects from the medication. d. My behavior is not consistent with the responsibilities outlined above, thereby causing safety concerns to continue prescribing controlled substance medications. e. I fail to follow the terms of this agreement. f. Other:____________________________       UNDERSTANDING THIS MEDICATION AGREEMENT:    I have read the above and have had all my questions answered. For chronic disease management, I know that my symptoms can be managed with many types of treatments. A chronic medication trial may be part of my treatment, but I must be an active participant in my care. Medication therapy is only one part of my symptom management plan. In some cases, there may be limited scientific evidence to support the chronic use of certain medications to improve symptoms and daily function. Furthermore, in certain circumstances, there may be scientific information that suggests that the use of chronic controlled substances may worsen my symptoms and increase my risk of unintentional death directly related to this medication therapy. I know that if my provider feels my risk from controlled medications is greater than my benefit, I will have my controlled substance medication(s) compassionately lowered or removed altogether.      I further agree to allow this office to contact my HIPAA contact if there are concerns about my safety and use of the controlled medications. I have agreed to use the prescribed controlled substance medications to me as instructed by my provider and as stated in this Medication Agreement. My initial on each page and my signature below shows that I have read each page and I have had the opportunity to ask questions with answers provided by my provider.     Patient Name (Printed): _____________________________________  Patient Signature:  ______________________   Date: _____________    Prescriber Name (Printed): ___________________________________  Prescriber Signature: _____________________  Date: _____________

## 2021-04-06 NOTE — ASSESSMENT & PLAN NOTE
OARRS reviewed today, will increase tramadol back to bid, Risks/benefits/SE reviewed, pt voices understanding  Recheck in 3 months,

## 2021-04-22 ENCOUNTER — TELEPHONE (OUTPATIENT)
Dept: FAMILY MEDICINE CLINIC | Age: 71
End: 2021-04-22

## 2021-04-23 DIAGNOSIS — F51.01 PRIMARY INSOMNIA: ICD-10-CM

## 2021-04-23 RX ORDER — DEXTROMETHORPHAN HYDROBROMIDE AND PROMETHAZINE HYDROCHLORIDE 15; 6.25 MG/5ML; MG/5ML
5 SYRUP ORAL 4 TIMES DAILY PRN
Qty: 240 ML | Refills: 0 | Status: SHIPPED | OUTPATIENT
Start: 2021-04-23 | End: 2021-04-30

## 2021-04-23 RX ORDER — TRAZODONE HYDROCHLORIDE 100 MG/1
TABLET ORAL
Qty: 60 TABLET | Refills: 5 | Status: SHIPPED | OUTPATIENT
Start: 2021-04-23 | End: 2021-09-27

## 2021-04-23 RX ORDER — TRAZODONE HYDROCHLORIDE 100 MG/1
TABLET ORAL
Qty: 60 TABLET | Refills: 5 | OUTPATIENT
Start: 2021-04-23

## 2021-05-18 ENCOUNTER — OFFICE VISIT (OUTPATIENT)
Dept: INTERNAL MEDICINE CLINIC | Age: 71
End: 2021-05-18
Payer: MEDICARE

## 2021-05-18 ENCOUNTER — HOSPITAL ENCOUNTER (OUTPATIENT)
Age: 71
Discharge: HOME | End: 2021-05-18
Payer: MEDICARE

## 2021-05-18 ENCOUNTER — HOSPITAL ENCOUNTER (OUTPATIENT)
Dept: GENERAL RADIOLOGY | Age: 71
Discharge: HOME | End: 2021-05-18
Payer: MEDICARE

## 2021-05-18 VITALS — HEART RATE: 76 BPM | DIASTOLIC BLOOD PRESSURE: 80 MMHG | SYSTOLIC BLOOD PRESSURE: 142 MMHG | OXYGEN SATURATION: 97 %

## 2021-05-18 DIAGNOSIS — M25.562 ACUTE PAIN OF LEFT KNEE: Primary | ICD-10-CM

## 2021-05-18 DIAGNOSIS — M25.562 ACUTE PAIN OF LEFT KNEE: ICD-10-CM

## 2021-05-18 PROCEDURE — 4040F PNEUMOC VAC/ADMIN/RCVD: CPT | Performed by: NURSE PRACTITIONER

## 2021-05-18 PROCEDURE — 1036F TOBACCO NON-USER: CPT | Performed by: NURSE PRACTITIONER

## 2021-05-18 PROCEDURE — 1123F ACP DISCUSS/DSCN MKR DOCD: CPT | Performed by: NURSE PRACTITIONER

## 2021-05-18 PROCEDURE — 99213 OFFICE O/P EST LOW 20 MIN: CPT | Performed by: NURSE PRACTITIONER

## 2021-05-18 PROCEDURE — G8427 DOCREV CUR MEDS BY ELIG CLIN: HCPCS | Performed by: NURSE PRACTITIONER

## 2021-05-18 PROCEDURE — G8400 PT W/DXA NO RESULTS DOC: HCPCS | Performed by: NURSE PRACTITIONER

## 2021-05-18 PROCEDURE — G8417 CALC BMI ABV UP PARAM F/U: HCPCS | Performed by: NURSE PRACTITIONER

## 2021-05-18 PROCEDURE — 73562 X-RAY EXAM OF KNEE 3: CPT

## 2021-05-18 PROCEDURE — 3017F COLORECTAL CA SCREEN DOC REV: CPT | Performed by: NURSE PRACTITIONER

## 2021-05-18 PROCEDURE — 1090F PRES/ABSN URINE INCON ASSESS: CPT | Performed by: NURSE PRACTITIONER

## 2021-05-18 RX ORDER — TRAMADOL HYDROCHLORIDE 50 MG/1
TABLET ORAL
COMMUNITY
Start: 2021-05-18 | End: 2021-07-08 | Stop reason: SDUPTHER

## 2021-05-18 ASSESSMENT — ENCOUNTER SYMPTOMS
WHEEZING: 0
EYE DISCHARGE: 0
PHOTOPHOBIA: 0
BACK PAIN: 0
RHINORRHEA: 0
SORE THROAT: 0
EYE REDNESS: 0
CHEST TIGHTNESS: 0
SINUS PRESSURE: 0
EYE PAIN: 0
SHORTNESS OF BREATH: 0
COLOR CHANGE: 0
COUGH: 0
SINUS PAIN: 0

## 2021-05-18 NOTE — PROGRESS NOTES
5/18/21    Chief Complaint   Patient presents with    Knee Pain     Patient complains of left knee pain. She states this is a new issue and has not had this in the past.         Hermelindo Johnson, (1950), is a 70 y.o. female, is here for evaluation of the following medical concerns:    HPI    She is being seen today through the [facility]. PCP:  Gen Choi, JESICA    Left Knee Pain:  He is being seen today for a 3 week history of left knee pain that has significantly worsened for the past 3-4 days. States that original injury 3 weeks ago happened after hitting both knees on her [place] with what she describes as possible hyperextension. Pain has been minimal until 3-4 days ago with no new injury pain increased to 8/10 pain increases with activity, with increased swelling. Pain begins medial knee and will radiate to posterior knee. She is already prescribed Tramadol for PRN for chronic pain. Taking Tylenol 1000 mg 3-4 times per day. Has tried Tylenol and knee sleeve. Denies twisting injury, or new injury. Denies fever, redness, calf pain, warmth, lower extremity edema. Review of Systems   Constitutional: Negative for activity change, appetite change, chills, diaphoresis, fatigue, fever and unexpected weight change. HENT: Negative for congestion, ear discharge, ear pain, postnasal drip, rhinorrhea, sinus pressure, sinus pain, sneezing and sore throat. Eyes: Negative for photophobia, pain, discharge and redness. Respiratory: Negative for cough, chest tightness, shortness of breath and wheezing. Cardiovascular: Negative for chest pain, palpitations and leg swelling. Musculoskeletal: Positive for arthralgias and joint swelling. Negative for back pain and myalgias. Left knee pain   Skin: Negative for color change, pallor, rash and wound. Allergic/Immunologic: Negative for immunocompromised state.    Neurological: Negative for dizziness, syncope, weakness, light-headedness and HYSTERECTOMY      MIDDLE EAR SURGERY         Social History     Tobacco Use    Smoking status: Former Smoker     Packs/day: 1.00     Years: 30.00     Pack years: 30.00     Quit date: 2015     Years since quittin.4    Smokeless tobacco: Never Used   Vaping Use    Vaping Use: Never used   Substance Use Topics    Alcohol use: No    Drug use: No       Family History   Problem Relation Age of Onset    Asthma Mother     Diabetes Father     Asthma Sister     Stroke Sister     Cancer Brother     Cancer Maternal Grandmother     Heart Disease Maternal Grandmother     Colon Cancer Maternal Grandfather        Lab Results   Component Value Date    WBC 7.8 2020    HGB 13.7 2020    HCT 40.8 2020    MCV 97.7 2020     2020     No results found for: LABA1C  Lab Results   Component Value Date    TSH 3.76 2020     Lab Results   Component Value Date    CHOL 234 (H) 2018    TRIG 79 2018    HDL 62 (H) 2020    LDLCALC 155 (H) 2020    LABVLDL 24 2020         No results found for this visit on 21. Wt Readings from Last 3 Encounters:   21 138 lb 6.4 oz (62.8 kg)   21 137 lb (62.1 kg)   21 139 lb (63 kg)     . FLOWAMB[6   BP Readings from Last 3 Encounters:   21 (!) 142/80   21 110/60   21 124/70     Pulse Readings from Last 3 Encounters:   21 76   21 72   21 77        Physical Exam  Musculoskeletal:      Right knee: Normal.      Left knee: Swelling, effusion and bony tenderness present. No deformity, erythema, ecchymosis, lacerations or crepitus. Decreased range of motion. Tenderness present over the medial joint line. No LCL laxity, MCL laxity, ACL laxity or PCL laxity. Normal alignment, normal meniscus and normal patellar mobility. Normal pulse. Instability Tests: Anterior drawer test negative. Posterior drawer test negative.         Legs:            PHQ Scores 2020 10/1/2020 7/17/2020 2/17/2020 1/28/2020 1/23/2019   PHQ2 Score 2 0 2 2 2 3 2   PHQ9 Score 2 0 2 2 2 15 2     Interpretation of Total Score Depression Severity: 1-4 = Minimal depression, 5-9 = Mild depression, 10-14 = Moderate depression, 15-19 = Moderately severe depression, 20-27 = Severe depression    Narrative   EXAMINATION:   THREE XRAY VIEWS OF THE LEFT KNEE       5/18/2021 12:23 pm       COMPARISON:   None.       HISTORY:   ORDERING SYSTEM PROVIDED HISTORY: Acute pain of left knee   TECHNOLOGIST PROVIDED HISTORY:   Reason for exam:->3 day history of left knee pain and swelling       FINDINGS:   Moderate osteoarthritis affects the left knee with joint space narrowing most   notably affecting the medial tibiofemoral compartment and tricompartmental   osteophytes.  There is a small knee joint effusion.  No radiographic evidence   of acute fracture or dislocation is seen.  Vascular calcification changes are   seen.  The bones appear demineralized.           Impression   Moderate osteoarthritis affecting the left knee, most notably affecting the   medial tibiofemoral compartment and small knee joint effusion.       The bones appear demineralized without radiographic evidence of acute   fracture or dislocation seen.           ASSESSMENT AND PLAN:    1. Acute pain of left knee  Imaging ordered for further evaluation. Moderate osteoarthritis with small knee joint effusion. Recommended to to complete previously ordered DEXA scan for further evaluation of bone demineralization. Aamir wrap placed for comfort and compression. Education provided on RICE, Tylenol 1000 mg TID PRN pain. Referral to ortho for further evaluation for possible tear, or bursitis if pain worsens or does not improve. Advised to f/u with PCP or ED for redness, warmth, increased pain or swelling. Verbalizes understanding.   - XR KNEE LEFT (3 VIEWS);  Future  - [facility] - Praneeth Clarke MD, Oprthopedic Surgery, [facility] Farnam        Return if symptoms worsen

## 2021-05-18 NOTE — PATIENT INSTRUCTIONS
Patient Education        Joint Pain: Care Instructions  Your Care Instructions     Many people have small aches and pains from overuse or injury to muscles and joints. Joint injuries often happen during sports or recreation, work tasks, or projects around the home. An overuse injury can happen when you put too much stress on a joint or when you do an activity that stresses the joint over and over, such as using the computer or rowing a boat. You can take action at home to help your muscles and joints get better. You should feel better in 1 to 2 weeks, but it can take 3 months or more to heal completely. Follow-up care is a key part of your treatment and safety. Be sure to make and go to all appointments, and call your doctor if you are having problems. It's also a good idea to know your test results and keep a list of the medicines you take. How can you care for yourself at home? · Do not put weight on the injured joint for at least a day or two. · For the first day or two after an injury, do not take hot showers or baths, and do not use hot packs. The heat could make swelling worse. · Put ice or a cold pack on the sore joint for 10 to 20 minutes at a time. Try to do this every 1 to 2 hours for the next 3 days (when you are awake) or until the swelling goes down. Put a thin cloth between the ice and your skin. · Wrap the injury in an elastic bandage. Do not wrap it too tightly because this can cause more swelling. · Prop up the sore joint on a pillow when you ice it or anytime you sit or lie down during the next 3 days. Try to keep it above the level of your heart. This will help reduce swelling. · Take an over-the-counter pain medicine, such as acetaminophen (Tylenol), ibuprofen (Advil, Motrin), or naproxen (Aleve). Read and follow all instructions on the label. · After 1 or 2 days of rest, begin moving the joint gently.  While the joint is still healing, you can begin to exercise using activities that do not strain or hurt the painful joint. When should you call for help? Call your doctor now or seek immediate medical care if:    · You have signs of infection, such as:  ? Increased pain, swelling, warmth, and redness. ? Red streaks leading from the joint. ? A fever. Watch closely for changes in your health, and be sure to contact your doctor if:    · Your movement or symptoms are not getting better after 1 to 2 weeks of home treatment. Where can you learn more? Go to https://ViFlux.[employer]. org and sign in to your [employer] account. Enter P205 in the [facility] box to learn more about \"Joint Pain: Care Instructions. \"     If you do not have an account, please click on the \"Sign Up Now\" link. Current as of: November 16, 2020               Content Version: 12.8  © 2006-2021 [employer]. Care instructions adapted under license by [facility] ([facility]). If you have questions about a medical condition or this instruction, always ask your healthcare professional. [place] [address] any warranty or liability for your use of this information. Patient Education        Knee Pain or Injury: Care Instructions  Your Care Instructions     Injuries are a common cause of knee problems. Sudden (acute) injuries may be caused by a direct blow to the knee. They can also be caused by abnormal twisting, bending, or falling on the knee. Pain, bruising, or swelling may be severe, and may start within minutes of the injury. Overuse is another cause of knee pain. Other causes are climbing stairs, kneeling, and other activities that use the knee. Everyday wear and tear, especially as you get older, also can cause knee pain. Rest, along with home treatment, often relieves pain and allows your knee to heal. If you have a serious knee injury, you may need tests and treatment. Follow-up care is a key part of your treatment and safety.  Be sure to make and go to all appointments, and call your doctor if you are having problems. It's also a good idea to know your test results and keep a list of the medicines you take. How can you care for yourself at home? · Be safe with medicines. Read and follow all instructions on the label. ? If the doctor gave you a prescription medicine for pain, take it as prescribed. ? If you are not taking a prescription pain medicine, ask your doctor if you can take an over-the-counter medicine. · Rest and protect your knee. Take a break from any activity that may cause pain. · Put ice or a cold pack on your knee for 10 to 20 minutes at a time. Put a thin cloth between the ice and your skin. · Prop up a sore knee on a pillow when you ice it or anytime you sit or lie down for the next 3 days. Try to keep it above the level of your heart. This will help reduce swelling. · If your knee is not swollen, you can put moist heat, a heating pad, or a warm cloth on your knee. · If your doctor recommends an elastic bandage, sleeve, or other type of support for your knee, wear it as directed. · Follow your doctor's instructions about how much weight you can put on your leg. Use a cane, crutches, or a walker as instructed. · Follow your doctor's instructions about activity during your healing process. If you can do mild exercise, slowly increase your activity. · Reach and stay at a healthy weight. Extra weight can strain the joints, especially the knees and hips, and make the pain worse. Losing even a few pounds may help. When should you call for help? Call 911 anytime you think you may need emergency care. For example, call if:    · You have symptoms of a blood clot in your lung (called a pulmonary embolism). These may include:  ? Sudden chest pain. ? Trouble breathing. ? Coughing up blood.    Call your doctor now or seek immediate medical care if:    · You have severe or increasing pain.     · Your leg or foot turns cold or changes color.     · You cannot stand or put weight on your knee.     · Your knee looks twisted or bent out of shape.     · You cannot move your knee.     · You have signs of infection, such as:  ? Increased pain, swelling, warmth, or redness. ? Red streaks leading from the knee. ? Pus draining from a place on your knee. ? A fever.     · You have signs of a blood clot in your leg (called a deep vein thrombosis), such as:  ? Pain in your calf, back of the knee, thigh, or groin. ? Redness and swelling in your leg or groin. Watch closely for changes in your health, and be sure to contact your doctor if:    · You have tingling, weakness, or numbness in your knee.     · You have any new symptoms, such as swelling.     · You have bruises from a knee injury that last longer than 2 weeks.     · You do not get better as expected. Where can you learn more? Go to https://[employer]CareerElite.fundfindr. org and sign in to your [employer] account. Enter K195 in the [employer] box to learn more about \"Knee Pain or Injury: Care Instructions. \"     If you do not have an account, please click on the \"Sign Up Now\" link. Current as of: February 26, 2020               Content Version: 12.8  © 2006-2021 [employer]. Care instructions adapted under license by [facility] ([facility]). If you have questions about a medical condition or this instruction, always ask your healthcare professional. [place] [address] any warranty or liability for your use of this information.

## 2021-05-28 ENCOUNTER — OFFICE VISIT (OUTPATIENT)
Dept: ORTHOPEDIC SURGERY | Age: 71
End: 2021-05-28
Payer: MEDICARE

## 2021-05-28 VITALS
BODY MASS INDEX: 27.82 KG/M2 | OXYGEN SATURATION: 97 % | HEART RATE: 78 BPM | HEIGHT: 59 IN | WEIGHT: 138 LBS | RESPIRATION RATE: 15 BRPM

## 2021-05-28 DIAGNOSIS — M17.12 PRIMARY OSTEOARTHRITIS OF LEFT KNEE: Primary | ICD-10-CM

## 2021-05-28 PROCEDURE — 20610 DRAIN/INJ JOINT/BURSA W/O US: CPT | Performed by: PHYSICIAN ASSISTANT

## 2021-05-28 PROCEDURE — 1090F PRES/ABSN URINE INCON ASSESS: CPT | Performed by: PHYSICIAN ASSISTANT

## 2021-05-28 PROCEDURE — G8417 CALC BMI ABV UP PARAM F/U: HCPCS | Performed by: PHYSICIAN ASSISTANT

## 2021-05-28 PROCEDURE — G8427 DOCREV CUR MEDS BY ELIG CLIN: HCPCS | Performed by: PHYSICIAN ASSISTANT

## 2021-05-28 PROCEDURE — 99202 OFFICE O/P NEW SF 15 MIN: CPT | Performed by: PHYSICIAN ASSISTANT

## 2021-05-28 ASSESSMENT — ENCOUNTER SYMPTOMS
GASTROINTESTINAL NEGATIVE: 1
EYES NEGATIVE: 1
RESPIRATORY NEGATIVE: 1

## 2021-05-28 NOTE — PROGRESS NOTES
Patient presents to the office today of evaluation of the left knee. Pt states ongoing pain in the left knee for about a month now. Pt states she can not remember exactly what she did to her left knee. Pt states she did hit her knee on her [employer] about a month ago and it caused increase swelling and pain along the medial aspect of the left knee. Pt states pain will increase at night and will become an achy feeling. She has tried tramadol with no relief. Pt states she is having a difficult time getting up from a sitting position or going up and down the stairs.

## 2021-05-28 NOTE — PATIENT INSTRUCTIONS
Continue weight-bearing as tolerated. Continue range of motion exercises as instructed. Ice and elevate as needed. Tylenol or Motrin for pain.   Steroid injection given today    Follow up in 6 weeks

## 2021-05-28 NOTE — PROGRESS NOTES
Review of Systems   Constitutional: Negative. HENT: Negative. Eyes: Negative. Respiratory: Negative. Cardiovascular: Negative. Gastrointestinal: Negative. Genitourinary: Negative. Musculoskeletal: Positive for arthralgias and myalgias. Skin: Negative. Negative for rash and wound. Neurological: Negative. Psychiatric/Behavioral: Negative. HPI:  Cheryl Garcia is a 70 y.o. female that presents the office today with intermittent left knee pain that has worsened over the last month after she hit her knee on a van. Most of her pain is along the medial aspect of her knee but she also has pain over the lateral joint line as well. She states that for years every now and then if she would move wrong she would have exquisite knee pain but is just gotten worse over the last month. She rates her knee pain at a 7/10, aching and worse with weightbearing. She has tried tramadol with no relief. The patient was referred by LAMBERT Sierra -*   for Left knee pain. Past Medical History:   Diagnosis Date    Aneurysm of abdominal aorta (HCC)     Asthma     Back ache     reason for taking tramadol    COPD (chronic obstructive pulmonary disease) ([address]     H/O echocardiogram 02/18/2020    EF 50-55%. Sclerotic, but non-stenotic aortic valve. Moderate AR. Mild TR, MR.     History of nuclear stress test 02/18/2020    EF 75%. This is a normal study.     Needs flu shot 9/28/2018       Past Surgical History:   Procedure Laterality Date    APPENDECTOMY      HYSTERECTOMY      MIDDLE EAR SURGERY         Family History   Problem Relation Age of Onset    Asthma Mother     Diabetes Father     Asthma Sister     Stroke Sister     Cancer Brother     Cancer Maternal Grandmother     Heart Disease Maternal Grandmother     Colon Cancer Maternal Grandfather        Social History     Socioeconomic History    Marital status: Single     Spouse name: None    Number of children: None  Years of education: None    Highest education level: None   Occupational History    None   Tobacco Use    Smoking status: Former Smoker     Packs/day: 1.00     Years: 30.00     Pack years: 30.00     Quit date: 2015     Years since quittin.4    Smokeless tobacco: Never Used   Vaping Use    Vaping Use: Never used   Substance and Sexual Activity    Alcohol use: No    Drug use: No    Sexual activity: Never   Other Topics Concern    None   Social History Narrative    None     Social Determinants of Health     Financial Resource Strain:     Difficulty of Paying Living Expenses:    Food Insecurity:     Worried About Running Out of Food in the Last Year:     Ran Out of Food in the Last Year:    Transportation Needs:     Lack of Transportation (Medical):  Lack of Transportation (Non-Medical):    Physical Activity:     Days of Exercise per Week:     Minutes of Exercise per Session:    Stress:     Feeling of Stress :    Social Connections:     Frequency of Communication with Friends and Family:     Frequency of Social Gatherings with Friends and Family:     Attends Zoroastrianism Services:     Active Member of Clubs or Organizations:     Attends Club or Organization Meetings:     Marital Status:    Intimate Partner Violence:     Fear of Current or Ex-Partner:     Emotionally Abused:     Physically Abused:     Sexually Abused:        Current Outpatient Medications   Medication Sig Dispense Refill    traMADol (ULTRAM) 50 MG tablet       traZODone (DESYREL) 100 MG tablet Take 1-2 at night as needed for sleep.  60 tablet 5    busPIRone (BUSPAR) 5 MG tablet Take 1 tablet by mouth 3 times daily 90 tablet 0    buPROPion (WELLBUTRIN XL) 300 MG extended release tablet TAKE ONE TABLET BY MOUTH IN THE MORNING 30 tablet 3    albuterol sulfate HFA (VENTOLIN HFA) 108 (90 Base) MCG/ACT inhaler Inhale 2 puffs into the lungs every 6 hours as needed for Wheezing 1 Inhaler 5    atorvastatin (LIPITOR) significant degenerative changes throughout the knee with the medial compartment being most affected where there is bone-on-bone articulation in the medial compartment. There are no acute fractures or dislocations. The official read and interpretation of these x-rays will be done by the the [facility]       Impression:  left knee DJD      Plan:  Natural history and expected course discussed. Questions answered. Patient Instructions   Continue weight-bearing as tolerated. Continue range of motion exercises as instructed. Ice and elevate as needed. Tylenol or Motrin for pain. Steroid injection given today    Follow up in 6 weeks    Left knee injection procedure note    Pre-procedure diagnosis:  Left knee DJD    Post-procedure diagnosis:  same    Procedure: The planned procedure/risks/benefits/alternatives were discussed with the patient. Risks include, but are not limited to, increased pain, drug reaction, infection, bleeding, lack of improvement, neurovascular injury, and increased blood sugar levels. The patient understood and all of their questions were answered. The Left knee was prepped with alcohol then a 22 gauge needle was advanced into the inferior-lateral joint without difficulty. The joint was then injected with 1 ml 1% lidocaine, 1ml of Kenalog (40 mg/ml), 1ml 0.5% bupivacaine the injection site was cleansed with isopropyl alcohol and a band-aid was placed. Complications:  None, the patient tolerated the procedure well. Instructions: The patient was advised to rest the knee and decrease activity for the next 24 to 48 hours. May use prescription or OTC pain relievers as needed for any post-injection pain as well as ice.

## 2021-07-08 ENCOUNTER — OFFICE VISIT (OUTPATIENT)
Dept: FAMILY MEDICINE CLINIC | Age: 71
End: 2021-07-08
Payer: MEDICARE

## 2021-07-08 VITALS
WEIGHT: 135.8 LBS | SYSTOLIC BLOOD PRESSURE: 137 MMHG | DIASTOLIC BLOOD PRESSURE: 74 MMHG | TEMPERATURE: 97.9 F | OXYGEN SATURATION: 96 % | HEART RATE: 74 BPM | RESPIRATION RATE: 14 BRPM | BODY MASS INDEX: 27.43 KG/M2

## 2021-07-08 DIAGNOSIS — F32.4 MAJOR DEPRESSIVE DISORDER WITH SINGLE EPISODE, IN PARTIAL REMISSION (HCC): ICD-10-CM

## 2021-07-08 DIAGNOSIS — J44.9 CHRONIC OBSTRUCTIVE PULMONARY DISEASE, UNSPECIFIED COPD TYPE (HCC): ICD-10-CM

## 2021-07-08 DIAGNOSIS — M54.50 CHRONIC MIDLINE LOW BACK PAIN WITHOUT SCIATICA: Primary | ICD-10-CM

## 2021-07-08 DIAGNOSIS — E27.8 LEFT ADRENAL MASS (HCC): ICD-10-CM

## 2021-07-08 DIAGNOSIS — I20.8 STABLE ANGINA PECTORIS (HCC): ICD-10-CM

## 2021-07-08 DIAGNOSIS — G89.29 CHRONIC MIDLINE LOW BACK PAIN WITHOUT SCIATICA: Primary | ICD-10-CM

## 2021-07-08 PROBLEM — I20.89 STABLE ANGINA PECTORIS: Status: ACTIVE | Noted: 2021-07-08

## 2021-07-08 PROCEDURE — G8926 SPIRO NO PERF OR DOC: HCPCS | Performed by: PHYSICIAN ASSISTANT

## 2021-07-08 PROCEDURE — G8427 DOCREV CUR MEDS BY ELIG CLIN: HCPCS | Performed by: PHYSICIAN ASSISTANT

## 2021-07-08 PROCEDURE — 1123F ACP DISCUSS/DSCN MKR DOCD: CPT | Performed by: PHYSICIAN ASSISTANT

## 2021-07-08 PROCEDURE — 3023F SPIROM DOC REV: CPT | Performed by: PHYSICIAN ASSISTANT

## 2021-07-08 PROCEDURE — G8400 PT W/DXA NO RESULTS DOC: HCPCS | Performed by: PHYSICIAN ASSISTANT

## 2021-07-08 PROCEDURE — 99214 OFFICE O/P EST MOD 30 MIN: CPT | Performed by: PHYSICIAN ASSISTANT

## 2021-07-08 PROCEDURE — 3017F COLORECTAL CA SCREEN DOC REV: CPT | Performed by: PHYSICIAN ASSISTANT

## 2021-07-08 PROCEDURE — 1090F PRES/ABSN URINE INCON ASSESS: CPT | Performed by: PHYSICIAN ASSISTANT

## 2021-07-08 PROCEDURE — G8417 CALC BMI ABV UP PARAM F/U: HCPCS | Performed by: PHYSICIAN ASSISTANT

## 2021-07-08 PROCEDURE — 1036F TOBACCO NON-USER: CPT | Performed by: PHYSICIAN ASSISTANT

## 2021-07-08 PROCEDURE — 4040F PNEUMOC VAC/ADMIN/RCVD: CPT | Performed by: PHYSICIAN ASSISTANT

## 2021-07-08 RX ORDER — TIZANIDINE 4 MG/1
4 TABLET ORAL EVERY 8 HOURS PRN
Qty: 40 TABLET | Refills: 2 | Status: SHIPPED | OUTPATIENT
Start: 2021-07-08 | End: 2021-09-27

## 2021-07-08 RX ORDER — PREDNISONE 20 MG/1
20 TABLET ORAL DAILY
Qty: 5 TABLET | Refills: 0 | Status: SHIPPED | OUTPATIENT
Start: 2021-07-08 | End: 2021-07-13

## 2021-07-08 RX ORDER — TRAMADOL HYDROCHLORIDE 50 MG/1
50 TABLET ORAL 2 TIMES DAILY PRN
Qty: 60 TABLET | Refills: 2 | Status: SHIPPED | OUTPATIENT
Start: 2021-07-08 | End: 2021-10-11 | Stop reason: SDUPTHER

## 2021-07-08 SDOH — ECONOMIC STABILITY: FOOD INSECURITY: WITHIN THE PAST 12 MONTHS, YOU WORRIED THAT YOUR FOOD WOULD RUN OUT BEFORE YOU GOT MONEY TO BUY MORE.: NEVER TRUE

## 2021-07-08 SDOH — ECONOMIC STABILITY: FOOD INSECURITY: WITHIN THE PAST 12 MONTHS, THE FOOD YOU BOUGHT JUST DIDN'T LAST AND YOU DIDN'T HAVE MONEY TO GET MORE.: NEVER TRUE

## 2021-07-08 ASSESSMENT — ENCOUNTER SYMPTOMS
SHORTNESS OF BREATH: 0
ABDOMINAL PAIN: 0
CHEST TIGHTNESS: 1
WHEEZING: 0
COUGH: 0
BACK PAIN: 1

## 2021-07-08 ASSESSMENT — PATIENT HEALTH QUESTIONNAIRE - PHQ9
SUM OF ALL RESPONSES TO PHQ QUESTIONS 1-9: 2
2. FEELING DOWN, DEPRESSED OR HOPELESS: 1
SUM OF ALL RESPONSES TO PHQ QUESTIONS 1-9: 2
SUM OF ALL RESPONSES TO PHQ9 QUESTIONS 1 & 2: 2
SUM OF ALL RESPONSES TO PHQ QUESTIONS 1-9: 2
1. LITTLE INTEREST OR PLEASURE IN DOING THINGS: 1

## 2021-07-08 ASSESSMENT — SOCIAL DETERMINANTS OF HEALTH (SDOH): HOW HARD IS IT FOR YOU TO PAY FOR THE VERY BASICS LIKE FOOD, HOUSING, MEDICAL CARE, AND HEATING?: NOT HARD AT ALL

## 2021-07-08 NOTE — PROGRESS NOTES
Shakira Hernandez  1950  70 y.o.  female    SUBJECTIVE:    Chief Complaint   Patient presents with    Follow-up     Patient presents today for a 3 month follow up.  Asthma    Knee Pain     Patient is seeing Dr. Bacilio RHODES   Chronic pain-pt reports chronic low back pain/OA herb hands. Pain severe at times and causes difficulty with ADLs and leading to poorer quality of life due to pain. Pt is raising great grandchildren and pain control allows her to remain functional and able to provide care to great grandchildren    Angina-stable, denies any recent chest pain, doing well at this time    Depression-chronic, doing well with current medications    Left adrenal mass-stable on CT earlier this year    COPD-stable, does occ use steroid when flared. Compliant with anoro/rescue meds prn. PHQ Scores 7/8/2021 4/6/2021 11/12/2020 10/1/2020 7/17/2020 2/17/2020 1/28/2020   PHQ2 Score 2 2 0 2 2 2 3   PHQ9 Score 2 2 0 2 2 2 15     Interpretation of Total Score Depression Severity: 1-4 = Minimal depression, 5-9 = Mild depression, 10-14 = Moderate depression, 15-19 = Moderately severe depression, 20-27 = Severe depression     Current Outpatient Medications on File Prior to Visit   Medication Sig Dispense Refill    traZODone (DESYREL) 100 MG tablet Take 1-2 at night as needed for sleep.  60 tablet 5    busPIRone (BUSPAR) 5 MG tablet Take 1 tablet by mouth 3 times daily 90 tablet 0    buPROPion (WELLBUTRIN XL) 300 MG extended release tablet TAKE ONE TABLET BY MOUTH IN THE MORNING 30 tablet 3    albuterol sulfate HFA (VENTOLIN HFA) 108 (90 Base) MCG/ACT inhaler Inhale 2 puffs into the lungs every 6 hours as needed for Wheezing 1 Inhaler 5    atorvastatin (LIPITOR) 10 MG tablet Take 1 tablet by mouth daily 90 tablet 1    umeclidinium-vilanterol (ANORO ELLIPTA) 62.5-25 MCG/INH AEPB inhaler Inhale 1 puff into the lungs daily 1 each 5    ipratropium-albuterol (DUONEB) 0.5-2.5 (3) MG/3ML SOLN nebulizer solution Take 3 mLs by nebulization 4 times daily 360 mL 0    acetaminophen (TYLENOL) 325 MG tablet Take 2 tablets by mouth every 4 hours as needed for Pain or Fever 120 tablet 3     No current facility-administered medications on file prior to visit. No Known Allergies    Past Medical History:   Diagnosis Date    Aneurysm of abdominal aorta (HCC)     Asthma     Back ache     reason for taking tramadol    COPD (chronic obstructive pulmonary disease) ([address]     H/O echocardiogram 2020    EF 50-55%. Sclerotic, but non-stenotic aortic valve. Moderate AR. Mild TR, MR.     History of nuclear stress test 2020    EF 75%. This is a normal study.  Needs flu shot 2018       Past Surgical History:   Procedure Laterality Date    APPENDECTOMY      HYSTERECTOMY      MIDDLE EAR SURGERY         Social History     Socioeconomic History    Marital status: Single     Spouse name: None    Number of children: None    Years of education: None    Highest education level: None   Occupational History    None   Tobacco Use    Smoking status: Former Smoker     Packs/day: 1.00     Years: 30.00     Pack years: 30.00     Quit date: 2015     Years since quittin.5    Smokeless tobacco: Never Used   Vaping Use    Vaping Use: Never used   Substance and Sexual Activity    Alcohol use: No    Drug use: No    Sexual activity: Never   Other Topics Concern    None   Social History Narrative    None     Social Determinants of Health     Financial Resource Strain: Low Risk     Difficulty of Paying Living Expenses: Not hard at all   Food Insecurity: No Food Insecurity    Worried About Running Out of Food in the Last Year: Never true    Amor of Food in the Last Year: Never true   Transportation Needs:     Lack of Transportation (Medical):      Lack of Transportation (Non-Medical):    Physical Activity:     Days of Exercise per Week:     Minutes of Exercise per Session:    Stress:     Feeling of Stress : Social Connections:     Frequency of Communication with Friends and Family:     Frequency of Social Gatherings with Friends and Family:     Attends Orthodoxy Services:     Active Member of Clubs or Organizations:     Attends Club or Organization Meetings:     Marital Status:    Intimate Partner Violence:     Fear of Current or Ex-Partner:     Emotionally Abused:     Physically Abused:     Sexually Abused:        Review of Systems   Constitutional: Negative for fever. Respiratory: Positive for chest tightness. Negative for cough, shortness of breath and wheezing. Cardiovascular: Negative for chest pain. Gastrointestinal: Negative for abdominal pain. Musculoskeletal: Positive for arthralgias and back pain. Skin: Negative for rash. Neurological: Negative for dizziness and headaches. Hematological: Negative for adenopathy. Psychiatric/Behavioral: Positive for dysphoric mood. Negative for self-injury and suicidal ideas. The patient is not nervous/anxious. OBJECTIVE:    /74 (Site: Right Upper Arm, Position: Sitting, Cuff Size: Large Adult)   Pulse 74   Temp 97.9 °F (36.6 °C) (Temporal)   Resp 14   Wt 135 lb 12.8 oz (61.6 kg)   SpO2 96%   BMI 27.43 kg/m²     Physical Exam  Vitals reviewed. Constitutional:       Appearance: Normal appearance. HENT:      Head: Normocephalic. Right Ear: External ear normal.      Left Ear: External ear normal.   Cardiovascular:      Rate and Rhythm: Normal rate and regular rhythm. Heart sounds: Normal heart sounds. Pulmonary:      Effort: Pulmonary effort is normal.      Breath sounds: Normal breath sounds. Musculoskeletal:      Lumbar back: Tenderness present. Skin:     General: Skin is warm and dry. Neurological:      Mental Status: She is alert and oriented to person, place, and time. Psychiatric:         Mood and Affect: Mood normal.         Thought Content:  Thought content normal.         Judgment: Judgment normal. Yesy Rocha:    Problem List        Circulatory    Stable angina pectoris (HCC)     Stable            Relevant Medications    acetaminophen (TYLENOL) 325 MG tablet    buPROPion (WELLBUTRIN XL) 300 MG extended release tablet    traZODone (DESYREL) 100 MG tablet    tiZANidine (ZANAFLEX) 4 MG tablet    traMADol (ULTRAM) 50 MG tablet    predniSONE (DELTASONE) 20 MG tablet       Respiratory    COPD (chronic obstructive pulmonary disease) ([facility]     Will send script for steroids for pt to have on hand if exacerbates          Relevant Medications    ipratropium-albuterol (DUONEB) 0.5-2.5 (3) MG/3ML SOLN nebulizer solution    umeclidinium-vilanterol (ANORO ELLIPTA) 62.5-25 MCG/INH AEPB inhaler    albuterol sulfate HFA (VENTOLIN HFA) 108 (90 Base) MCG/ACT inhaler    predniSONE (DELTASONE) 20 MG tablet       Other    Major depressive disorder with single episode, in partial remission (HCC) (Chronic)     Does not feel need to adjust meds, Denies SI/HI/AVH            Relevant Medications    buPROPion (WELLBUTRIN XL) 300 MG extended release tablet    busPIRone (BUSPAR) 5 MG tablet    traZODone (DESYREL) 100 MG tablet    Left adrenal mass (HCC) (Chronic)     Stable on CT earlier this year          Chronic midline low back pain without sciatica - Primary     O[facility]RS reviewed, medication refilled today, recheck in 3 months          Relevant Medications    acetaminophen (TYLENOL) 325 MG tablet    tiZANidine (ZANAFLEX) 4 MG tablet    traMADol (ULTRAM) 50 MG tablet    predniSONE (DELTASONE) 20 MG tablet          Periodic Controlled Substance Monitoring: Possible medication side effects, risk of tolerance/dependence & alternative treatments discussed., No signs of potential drug abuse or diversion identified. Luis Felipe Simmons PA-C)    Return in about 3 months (around 10/8/2021).

## 2021-07-23 ENCOUNTER — OFFICE VISIT (OUTPATIENT)
Dept: ORTHOPEDIC SURGERY | Age: 71
End: 2021-07-23
Payer: MEDICARE

## 2021-07-23 ENCOUNTER — TELEPHONE (OUTPATIENT)
Dept: ORTHOPEDIC SURGERY | Age: 71
End: 2021-07-23

## 2021-07-23 VITALS
BODY MASS INDEX: 27.21 KG/M2 | RESPIRATION RATE: 16 BRPM | WEIGHT: 135 LBS | HEIGHT: 59 IN | OXYGEN SATURATION: 96 % | HEART RATE: 75 BPM

## 2021-07-23 DIAGNOSIS — M17.12 PRIMARY OSTEOARTHRITIS OF LEFT KNEE: Primary | ICD-10-CM

## 2021-07-23 PROCEDURE — 99212 OFFICE O/P EST SF 10 MIN: CPT | Performed by: PHYSICIAN ASSISTANT

## 2021-07-23 PROCEDURE — G8417 CALC BMI ABV UP PARAM F/U: HCPCS | Performed by: PHYSICIAN ASSISTANT

## 2021-07-23 PROCEDURE — G8400 PT W/DXA NO RESULTS DOC: HCPCS | Performed by: PHYSICIAN ASSISTANT

## 2021-07-23 PROCEDURE — 1090F PRES/ABSN URINE INCON ASSESS: CPT | Performed by: PHYSICIAN ASSISTANT

## 2021-07-23 PROCEDURE — 1123F ACP DISCUSS/DSCN MKR DOCD: CPT | Performed by: PHYSICIAN ASSISTANT

## 2021-07-23 PROCEDURE — 3017F COLORECTAL CA SCREEN DOC REV: CPT | Performed by: PHYSICIAN ASSISTANT

## 2021-07-23 PROCEDURE — 1036F TOBACCO NON-USER: CPT | Performed by: PHYSICIAN ASSISTANT

## 2021-07-23 PROCEDURE — G8427 DOCREV CUR MEDS BY ELIG CLIN: HCPCS | Performed by: PHYSICIAN ASSISTANT

## 2021-07-23 PROCEDURE — 4040F PNEUMOC VAC/ADMIN/RCVD: CPT | Performed by: PHYSICIAN ASSISTANT

## 2021-07-23 RX ORDER — PREDNISONE 20 MG/1
20 TABLET ORAL 2 TIMES DAILY
Qty: 14 TABLET | Refills: 0 | Status: SHIPPED | OUTPATIENT
Start: 2021-07-23 | End: 2021-07-30

## 2021-07-23 ASSESSMENT — ENCOUNTER SYMPTOMS
GASTROINTESTINAL NEGATIVE: 1
EYES NEGATIVE: 1
RESPIRATORY NEGATIVE: 1

## 2021-07-23 NOTE — PROGRESS NOTES
Review of Systems   Constitutional: Negative. HENT: Negative. Eyes: Negative. Respiratory: Negative. Cardiovascular: Negative. Gastrointestinal: Negative. Genitourinary: Negative. Musculoskeletal: Positive for arthralgias. Skin: Negative. Negative for rash and wound. Neurological: Negative. Psychiatric/Behavioral: Negative. HPI:  Tommie Mayer is a 70 y.o. female that presents the office today to follow-up on her left knee pain. She was given a shot of corticosteroid on 5/28/2021 which helped up until about a week ago. Now the pain in her knee is back. She has aching pain along the medial aspect of the knee along the front of the knee. She also notices some swelling. She has been trying to wrap the knee but the wrap keeps falling off. Past Medical History:   Diagnosis Date    Aneurysm of abdominal aorta (HCC)     Asthma     Back ache     reason for taking tramadol    COPD (chronic obstructive pulmonary disease) ([address]     H/O echocardiogram 02/18/2020    EF 50-55%. Sclerotic, but non-stenotic aortic valve. Moderate AR. Mild TR, MR.     History of nuclear stress test 02/18/2020    EF 75%. This is a normal study.     Needs flu shot 9/28/2018       Past Surgical History:   Procedure Laterality Date    APPENDECTOMY      HYSTERECTOMY      MIDDLE EAR SURGERY         Family History   Problem Relation Age of Onset    Asthma Mother     Diabetes Father     Asthma Sister     Stroke Sister     Cancer Brother     Cancer Maternal Grandmother     Heart Disease Maternal Grandmother     Colon Cancer Maternal Grandfather        Social History     Socioeconomic History    Marital status: Single     Spouse name: None    Number of children: None    Years of education: None    Highest education level: None   Occupational History    None   Tobacco Use    Smoking status: Former Smoker     Packs/day: 1.00     Years: 30.00     Pack years: 30.00     Quit date: 2015     Years since quittin.5    Smokeless tobacco: Never Used   Vaping Use    Vaping Use: Never used   Substance and Sexual Activity    Alcohol use: No    Drug use: No    Sexual activity: Never   Other Topics Concern    None   Social History Narrative    None     Social Determinants of Health     Financial Resource Strain: Low Risk     Difficulty of Paying Living Expenses: Not hard at all   Food Insecurity: No Food Insecurity    Worried About Running Out of Food in the Last Year: Never true    Amor of Food in the Last Year: Never true   Transportation Needs:     Lack of Transportation (Medical):  Lack of Transportation (Non-Medical):    Physical Activity:     Days of Exercise per Week:     Minutes of Exercise per Session:    Stress:     Feeling of Stress :    Social Connections:     Frequency of Communication with Friends and Family:     Frequency of Social Gatherings with Friends and Family:     Attends Spiritism Services:     Active Member of Clubs or Organizations:     Attends Club or Organization Meetings:     Marital Status:    Intimate Partner Violence:     Fear of Current or Ex-Partner:     Emotionally Abused:     Physically Abused:     Sexually Abused:        Current Outpatient Medications   Medication Sig Dispense Refill    predniSONE (DELTASONE) 20 MG tablet Take 1 tablet by mouth 2 times daily for 7 days 14 tablet 0    tiZANidine (ZANAFLEX) 4 MG tablet Take 1 tablet by mouth every 8 hours as needed (muscle spasm) 40 tablet 2    traMADol (ULTRAM) 50 MG tablet Take 1 tablet by mouth 2 times daily as needed for Pain for up to 90 days. Do not fill until 2021 60 tablet 2    traZODone (DESYREL) 100 MG tablet Take 1-2 at night as needed for sleep.  60 tablet 5    busPIRone (BUSPAR) 5 MG tablet Take 1 tablet by mouth 3 times daily 90 tablet 0    buPROPion (WELLBUTRIN XL) 300 MG extended release tablet TAKE ONE TABLET BY MOUTH IN THE MORNING 30 tablet 3    albuterol sulfate HFA (VENTOLIN HFA) 108 (90 Base) MCG/ACT inhaler Inhale 2 puffs into the lungs every 6 hours as needed for Wheezing 1 Inhaler 5    atorvastatin (LIPITOR) 10 MG tablet Take 1 tablet by mouth daily 90 tablet 1    umeclidinium-vilanterol (ANORO ELLIPTA) 62.5-25 MCG/INH AEPB inhaler Inhale 1 puff into the lungs daily 1 each 5    ipratropium-albuterol (DUONEB) 0.5-2.5 (3) MG/3ML SOLN nebulizer solution Take 3 mLs by nebulization 4 times daily 360 mL 0    acetaminophen (TYLENOL) 325 MG tablet Take 2 tablets by mouth every 4 hours as needed for Pain or Fever 120 tablet 3     No current facility-administered medications for this visit. No Known Allergies    Review of Systems:  See above      Physical Exam:   Pulse 75   Resp 16   Ht 4' 11\" (1.499 m)   Wt 135 lb (61.2 kg)   SpO2 96%   BMI 27.27 kg/m²        Gait is Antalgic. The patient can bear weight on the injured extremity. Gen/Psych:Examination reveals a pleasant individual in no acute distress. The patient is oriented to time, place and person. The patient's mood and affect are appropriate. Patient appears well nourished. Body habitus is normal     Lymph:  No lymphedema in bilateral lower extremities     Skin intact in bilateral lower extremities with no ulcerations, lesions, rash, erythema. Vascular: There are mild varicosities in bilateral lower extremities, sensation intact to light touch over bilateral lower extremities. left leg/knee exam:  Leg alignment:     neutral  Quadriceps/hamstring atrophy:   no  Knee effusion:    no   Knee erythema:   no  ROM:     Full, 0-120 degrees  Varus laxity at 0 and 30 deg's: no  Valguslaxity at 0 and 30 deg's: no  Recurvatum:    no  Tenderness at:    Anterior medial joint line, proximal tibia    Bilateral Knee strength is 5/5 flexion and extension  There is + L2-S1 motor and sensory function in bilateral lower extremities    Outside record review: office notes and prior x-rays reviewed    Imaging studies:  X-rays of the left knee reviewed from prior visits show severe medial compartment osteoarthritic changes with bone-on-bone articulation the medial compartment. The official read and interpretation of these x-rays will be done by the the [facility], [place], [facility] by [facility]         Impression:  left knee DJD      Plan:  Natural history and expected course discussed. Questions answered. Patient Instructions   Wrap the knee daily to help with swelling  Use ice as needed after activity. Work on maintaining range of motion both flexion and extension (bending and straightening)  Take prednisone as prescribed for the next week  If you would like another steroid injection the earliest you can have one is August 28  Call office to schedule visit if you would like to do the steroid injection.

## 2021-07-23 NOTE — TELEPHONE ENCOUNTER
Patient has had steroid injection in the knee and pain returned. She does not want knee replacement at this time and would like to consider viscosupplementation injections.

## 2021-07-23 NOTE — PATIENT INSTRUCTIONS
Wrap the knee daily to help with swelling  Use ice as needed after activity. Work on maintaining range of motion both flexion and extension (bending and straightening)  Take prednisone as prescribed for the next week  If you would like another steroid injection the earliest you can have one is August 28  Call office to schedule visit if you would like to do the steroid injection.

## 2021-08-03 NOTE — TELEPHONE ENCOUNTER
I called May[facility] Burns and spoke to [facility]. I was informed that no Ashley Simmering is needed for [facility] .  Call Ref # [id number]

## 2021-09-14 ENCOUNTER — NURSE TRIAGE (OUTPATIENT)
Dept: OTHER | Facility: CLINIC | Age: 71
End: 2021-09-14

## 2021-09-14 ENCOUNTER — TELEPHONE (OUTPATIENT)
Dept: FAMILY MEDICINE CLINIC | Age: 71
End: 2021-09-14

## 2021-09-14 DIAGNOSIS — J44.9 MODERATE COPD (CHRONIC OBSTRUCTIVE PULMONARY DISEASE) (HCC): ICD-10-CM

## 2021-09-14 RX ORDER — PREDNISONE 20 MG/1
20 TABLET ORAL DAILY
Qty: 5 TABLET | Refills: 0 | Status: SHIPPED | OUTPATIENT
Start: 2021-09-14 | End: 2021-09-19

## 2021-09-14 RX ORDER — ALBUTEROL SULFATE 90 UG/1
2 AEROSOL, METERED RESPIRATORY (INHALATION) EVERY 6 HOURS PRN
Status: CANCELLED | OUTPATIENT
Start: 2021-09-14

## 2021-09-14 RX ORDER — IPRATROPIUM BROMIDE AND ALBUTEROL SULFATE 2.5; .5 MG/3ML; MG/3ML
1 SOLUTION RESPIRATORY (INHALATION) 4 TIMES DAILY
Qty: 360 ML | Refills: 0 | Status: SHIPPED | OUTPATIENT
Start: 2021-09-14

## 2021-09-14 RX ORDER — ALBUTEROL SULFATE 90 UG/1
2 AEROSOL, METERED RESPIRATORY (INHALATION) EVERY 6 HOURS PRN
Qty: 1 EACH | Refills: 5 | Status: SHIPPED | OUTPATIENT
Start: 2021-09-14

## 2021-09-14 NOTE — TELEPHONE ENCOUNTER
Received call from [facility] at [facility] with [facility]. Brief description of triage: shortness of breath    Triage indicates for patient to be seen today by pcp or ucc if appt not available. Care advice provided, patient verbalizes understanding; denies any other questions or concerns; instructed to call back for any new or worsening symptoms. Writer provided warm transfer to [facility] at [facility] for appointment scheduling. Attention Provider: Thank you for allowing me to participate in the care of your patient. The patient was connected to triage in response to information provided to the ECC. Please do not respond through this encounter as the response is not directed to a shared pool. Reason for Disposition   Longstanding difficulty breathing (e.g., CHF, COPD, emphysema) and worse than normal    Answer Assessment - Initial Assessment Questions  1. RESPIRATORY STATUS: \"Describe your breathing? \" (e.g., wheezing, shortness of breath, unable to speak, severe coughing)      Shortness of breath, cough      2. ONSET: \"When did this breathing problem begin? \"   3 days'    3. PATTERN \"Does the difficult breathing come and go, or has it been constant since it started? \"    constant    4. SEVERITY: \"How bad is your breathing? \" (e.g., mild, moderate, severe)     - MILD: No SOB at rest, mild SOB with walking, speaks normally in sentences, can lay down, no retractions, pulse < 100.     - MODERATE: SOB at rest, SOB with minimal exertion and prefers to sit, cannot lie down flat, speaks in phrases, mild retractions, audible wheezing, pulse 100-120.     - SEVERE: Very SOB at rest, speaks in single words, struggling to breathe, sitting hunched forward, retractions, pulse > 120      Moderate    5. RECURRENT SYMPTOM: \"Have you had difficulty breathing before? \" If so, ask: \"When was the last time? \" and \"What happened that time? \"      Yes, 4- 5 months, given antibiotic and maylin      6. CARDIAC HISTORY: \"Do you have any history of heart disease? \" (e.g., heart attack, angina, bypass surgery, angioplasty)    no    7. LUNG HISTORY: \"Do you have any history of lung disease? \"  (e.g., pulmonary embolus, asthma, emphysema)     Copd, asthma    8. CAUSE: \"What do you think is causing the breathing problem? \"   Copd    9. OTHER SYMPTOMS: \"Do you have any other symptoms? (e.g., dizziness, runny nose, cough, chest pain, fever)  Cough, runny nose    10. PREGNANCY: \"Is there any chance you are pregnant? \" \"When was your last menstrual period? \"    Not addressed do to age    6. TRAVEL: \"Have you traveled out of the country in the last month? \" (e.g., travel history, exposures)      No    Protocols used: BREATHING DIFFICULTY-ADULT-[place]

## 2021-09-14 NOTE — TELEPHONE ENCOUNTER
I can send in refill of the prednisone but I also received triage note from RN. Recommended pt be seen at [facility] or ER. Is she going to be seen?

## 2021-09-14 NOTE — TELEPHONE ENCOUNTER
Client reports that she was trying to explain to the triage nurse, this time of the year with her COPD an irritating cough occurs especially at night. Client is not going to the ED or UC ,she does not feel it is needed. Client informed PCP indicated mediation will be sent to pharmacy as requested. To contact office or go to ED if symptoms do not improve with treatment. Client verbalized understanding.

## 2021-09-14 NOTE — TELEPHONE ENCOUNTER
----- Message from David Richter sent at 9/14/2021  1:36 PM EDT -----  Subject: Refill Request    QUESTIONS  Name of Medication? albuterol sulfate HFA (VENTOLIN HFA) 108 (90 Base)   MCG/ACT inhaler  Patient-reported dosage and instructions? 2 puffs every 6 hours for   wheezing  How many days do you have left? 10  Preferred Pharmacy? [facility] phone number (if available)? [phone]  ---------------------------------------------------------------------------  --------------,  Name of Medication? ipratropium-albuterol (DUONEB) 0.5-2.5 (3) MG/3ML SOLN   nebulizer solution  Patient-reported dosage and instructions? Take 3 mLs by nebulization 4   times daily  How many days do you have left? 4  Preferred Pharmacy? [facility] phone number (if available)? [phone]  ---------------------------------------------------------------------------  --------------  CALL BACK INFO  What is the best way for the office to contact you? OK to leave message on   voicemail  Preferred Call Back Phone Number?  [phone]

## 2021-09-14 NOTE — TELEPHONE ENCOUNTER
----- Message from Triston Sullivan sent at 9/14/2021  1:52 PM EDT -----  Subject: Refill Request    QUESTIONS  Name of Medication? predniSONE (DELTASONE) 20 MG tablet  Patient-reported dosage and instructions? 20mg once daily  How many days do you have left? 0  Preferred Pharmacy? [facility] phone number (if available)? [phone]  ---------------------------------------------------------------------------  --------------  CALL BACK INFO  What is the best way for the office to contact you? OK to leave message on   voicemail  Preferred Call Back Phone Number?  [phone]

## 2021-09-27 DIAGNOSIS — F51.01 PRIMARY INSOMNIA: ICD-10-CM

## 2021-09-27 RX ORDER — TRAZODONE HYDROCHLORIDE 100 MG/1
TABLET ORAL
Qty: 60 TABLET | Refills: 5 | Status: SHIPPED | OUTPATIENT
Start: 2021-09-27 | End: 2022-03-10 | Stop reason: SDUPTHER

## 2021-09-27 RX ORDER — TIZANIDINE 4 MG/1
4 TABLET ORAL EVERY 8 HOURS PRN
Qty: 40 TABLET | Refills: 2 | Status: SHIPPED | OUTPATIENT
Start: 2021-09-27 | End: 2022-01-24 | Stop reason: SDUPTHER

## 2021-10-11 ENCOUNTER — OFFICE VISIT (OUTPATIENT)
Dept: FAMILY MEDICINE CLINIC | Age: 71
End: 2021-10-11
Payer: MEDICARE

## 2021-10-11 VITALS
RESPIRATION RATE: 15 BRPM | OXYGEN SATURATION: 93 % | HEART RATE: 70 BPM | TEMPERATURE: 97.8 F | WEIGHT: 136 LBS | SYSTOLIC BLOOD PRESSURE: 112 MMHG | DIASTOLIC BLOOD PRESSURE: 66 MMHG | BODY MASS INDEX: 27.47 KG/M2

## 2021-10-11 DIAGNOSIS — Z78.0 POST-MENOPAUSAL: ICD-10-CM

## 2021-10-11 DIAGNOSIS — Z12.31 ENCOUNTER FOR SCREENING MAMMOGRAM FOR BREAST CANCER: ICD-10-CM

## 2021-10-11 DIAGNOSIS — G89.29 CHRONIC MIDLINE LOW BACK PAIN WITHOUT SCIATICA: Primary | ICD-10-CM

## 2021-10-11 DIAGNOSIS — J44.1 CHRONIC OBSTRUCTIVE PULMONARY DISEASE WITH ACUTE EXACERBATION (HCC): ICD-10-CM

## 2021-10-11 DIAGNOSIS — M54.50 CHRONIC MIDLINE LOW BACK PAIN WITHOUT SCIATICA: Primary | ICD-10-CM

## 2021-10-11 PROBLEM — Z23 NEED FOR PROPHYLACTIC VACCINATION AGAINST STREPTOCOCCUS PNEUMONIAE (PNEUMOCOCCUS): Status: ACTIVE | Noted: 2021-10-11

## 2021-10-11 PROCEDURE — G8427 DOCREV CUR MEDS BY ELIG CLIN: HCPCS | Performed by: PHYSICIAN ASSISTANT

## 2021-10-11 PROCEDURE — 1090F PRES/ABSN URINE INCON ASSESS: CPT | Performed by: PHYSICIAN ASSISTANT

## 2021-10-11 PROCEDURE — 3023F SPIROM DOC REV: CPT | Performed by: PHYSICIAN ASSISTANT

## 2021-10-11 PROCEDURE — G8926 SPIRO NO PERF OR DOC: HCPCS | Performed by: PHYSICIAN ASSISTANT

## 2021-10-11 PROCEDURE — G8417 CALC BMI ABV UP PARAM F/U: HCPCS | Performed by: PHYSICIAN ASSISTANT

## 2021-10-11 PROCEDURE — G8484 FLU IMMUNIZE NO ADMIN: HCPCS | Performed by: PHYSICIAN ASSISTANT

## 2021-10-11 PROCEDURE — 1123F ACP DISCUSS/DSCN MKR DOCD: CPT | Performed by: PHYSICIAN ASSISTANT

## 2021-10-11 PROCEDURE — 99214 OFFICE O/P EST MOD 30 MIN: CPT | Performed by: PHYSICIAN ASSISTANT

## 2021-10-11 PROCEDURE — 4040F PNEUMOC VAC/ADMIN/RCVD: CPT | Performed by: PHYSICIAN ASSISTANT

## 2021-10-11 PROCEDURE — G8400 PT W/DXA NO RESULTS DOC: HCPCS | Performed by: PHYSICIAN ASSISTANT

## 2021-10-11 PROCEDURE — 1036F TOBACCO NON-USER: CPT | Performed by: PHYSICIAN ASSISTANT

## 2021-10-11 PROCEDURE — 3017F COLORECTAL CA SCREEN DOC REV: CPT | Performed by: PHYSICIAN ASSISTANT

## 2021-10-11 RX ORDER — TRAMADOL HYDROCHLORIDE 50 MG/1
50 TABLET ORAL EVERY 6 HOURS PRN
COMMUNITY
End: 2021-10-11

## 2021-10-11 RX ORDER — BENZONATATE 100 MG/1
100 CAPSULE ORAL 3 TIMES DAILY PRN
Qty: 30 CAPSULE | Refills: 0 | Status: SHIPPED | OUTPATIENT
Start: 2021-10-11 | End: 2021-10-18

## 2021-10-11 RX ORDER — TRAMADOL HYDROCHLORIDE 50 MG/1
50 TABLET ORAL 2 TIMES DAILY PRN
Qty: 60 TABLET | Refills: 2 | Status: SHIPPED | OUTPATIENT
Start: 2021-10-11 | End: 2022-01-09

## 2021-10-11 RX ORDER — PREDNISONE 20 MG/1
20 TABLET ORAL DAILY
Qty: 5 TABLET | Refills: 0 | Status: SHIPPED | OUTPATIENT
Start: 2021-10-11 | End: 2021-10-16

## 2021-10-11 ASSESSMENT — PATIENT HEALTH QUESTIONNAIRE - PHQ9
SUM OF ALL RESPONSES TO PHQ QUESTIONS 1-9: 2
2. FEELING DOWN, DEPRESSED OR HOPELESS: 1
SUM OF ALL RESPONSES TO PHQ QUESTIONS 1-9: 2
1. LITTLE INTEREST OR PLEASURE IN DOING THINGS: 1
SUM OF ALL RESPONSES TO PHQ QUESTIONS 1-9: 2
SUM OF ALL RESPONSES TO PHQ9 QUESTIONS 1 & 2: 2

## 2021-10-11 NOTE — PROGRESS NOTES
Sangita Exon  1950  70 y.o.  female    SUBJECTIVE:    Chief Complaint   Patient presents with    Follow-up     Patient presents today fora 3 month follow up. HPI     Chronic pain-pt reports chronic low back pain/OA herb hands. Pain severe at times and causes difficulty with ADLs and leading to poorer quality of life due to pain. Pt is raising great grandchildren and pain control allows her to remain functional and able to provide care to great grandchildren    COPD-chronic, pt states she had recent exacerbation and seen in ER on 9/16/2021. Given duonebs and IV steroids (notes/labs/results reviewed today). Pt states she is doing much better but still has occ wheeze/cough. No fever/chills. No chest pain. Compliant with medications.      Postmenopausal-due for mammogram/bone density    PHQ Scores 10/11/2021 7/8/2021 4/6/2021 11/12/2020 10/1/2020 7/17/2020 2/17/2020   PHQ2 Score 2 2 2 0 2 2 2   PHQ9 Score 2 2 2 0 2 2 2     Interpretation of Total Score Depression Severity: 1-4 = Minimal depression, 5-9 = Mild depression, 10-14 = Moderate depression, 15-19 = Moderately severe depression, 20-27 = Severe depression     Current Outpatient Medications on File Prior to Visit   Medication Sig Dispense Refill    traZODone (DESYREL) 100 MG tablet TAKE 1 OR 2 TABLETS BY MOUTH NIGHTLY AS NEEDED FOR SLEEP 60 tablet 5    tiZANidine (ZANAFLEX) 4 MG tablet TAKE 1 TABLET BY MOUTH EVERY 8 HOURS AS NEEDED (MUSCLE SPASM) 40 tablet 2    ipratropium-albuterol (DUONEB) 0.5-2.5 (3) MG/3ML SOLN nebulizer solution Take 3 mLs by nebulization 4 times daily 360 mL 0    albuterol sulfate HFA (VENTOLIN HFA) 108 (90 Base) MCG/ACT inhaler Inhale 2 puffs into the lungs every 6 hours as needed for Wheezing 1 each 5    busPIRone (BUSPAR) 5 MG tablet Take 1 tablet by mouth 3 times daily 90 tablet 0    buPROPion (WELLBUTRIN XL) 300 MG extended release tablet TAKE ONE TABLET BY MOUTH IN THE MORNING 30 tablet 3    atorvastatin (LIPITOR) 10 MG tablet Take 1 tablet by mouth daily 90 tablet 1    umeclidinium-vilanterol (ANORO ELLIPTA) 62.5-25 MCG/INH AEPB inhaler Inhale 1 puff into the lungs daily 1 each 5    acetaminophen (TYLENOL) 325 MG tablet Take 2 tablets by mouth every 4 hours as needed for Pain or Fever 120 tablet 3     No current facility-administered medications on file prior to visit. No Known Allergies    Past Medical History:   Diagnosis Date    Aneurysm of abdominal aorta (HCC)     Asthma     Back ache     reason for taking tramadol    COPD (chronic obstructive pulmonary disease) ([facility] [address]     H/O echocardiogram 2020    EF 50-55%. Sclerotic, but non-stenotic aortic valve. Moderate AR. Mild TR, MR.     History of nuclear stress test 2020    EF 75%. This is a normal study.  Needs flu shot 2018       Past Surgical History:   Procedure Laterality Date    APPENDECTOMY      HYSTERECTOMY      MIDDLE EAR SURGERY         Social History     Socioeconomic History    Marital status: Single     Spouse name: None    Number of children: None    Years of education: None    Highest education level: None   Occupational History    None   Tobacco Use    Smoking status: Former Smoker     Packs/day: 1.00     Years: 30.00     Pack years: 30.00     Quit date: 2015     Years since quittin.8    Smokeless tobacco: Never Used   Vaping Use    Vaping Use: Never used   Substance and Sexual Activity    Alcohol use: No    Drug use: No    Sexual activity: Never   Other Topics Concern    None   Social History Narrative    None     Social Determinants of Health     Financial Resource Strain: Low Risk     Difficulty of Paying Living Expenses: Not hard at all   Food Insecurity: No Food Insecurity    Worried About Running Out of Food in the Last Year: Never true    Amor of Food in the Last Year: Never true   Transportation Needs:     Lack of Transportation (Medical):      Lack of Transportation (Non-Medical): Physical Activity:     Days of Exercise per Week:     Minutes of Exercise per Session:    Stress:     Feeling of Stress :    Social Connections:     Frequency of Communication with Friends and Family:     Frequency of Social Gatherings with Friends and Family:     Attends Orthodox Services:     Active Member of Clubs or Organizations:     Attends Club or Organization Meetings:     Marital Status:    Intimate Partner Violence:     Fear of Current or Ex-Partner:     Emotionally Abused:     Physically Abused:     Sexually Abused:        Review of Systems   Constitutional: Negative for chills and fever. Respiratory: Positive for cough and wheezing. Cardiovascular: Negative for chest pain. Gastrointestinal: Negative for abdominal pain. Musculoskeletal: Positive for arthralgias and back pain. Skin: Negative for rash. Psychiatric/Behavioral: Positive for dysphoric mood. OBJECTIVE:    /66 (Site: Left Upper Arm, Position: Sitting, Cuff Size: Medium Adult)   Pulse 70   Temp 97.8 °F (36.6 °C) (Temporal)   Resp 15   Wt 136 lb (61.7 kg)   SpO2 93%   BMI 27.47 kg/m²     Physical Exam  Vitals reviewed. Constitutional:       General: She is not in acute distress. Appearance: Normal appearance. HENT:      Head: Normocephalic. Right Ear: External ear normal.   Cardiovascular:      Rate and Rhythm: Normal rate and regular rhythm. Heart sounds: Normal heart sounds. Pulmonary:      Effort: Pulmonary effort is normal.      Breath sounds: Wheezing present. Abdominal:      Palpations: Abdomen is soft. Musculoskeletal:      Right hand: Bony tenderness present. Left hand: Bony tenderness present. Lumbar back: Tenderness present. Skin:     General: Skin is warm and dry. Neurological:      Mental Status: She is alert and oriented to person, place, and time. Psychiatric:         Mood and Affect: Mood normal.         Thought Content:  Thought content normal. Annabell Kehr:    Problem List        Respiratory    COPD (chronic obstructive pulmonary disease) (HCC)     Continue current meds, steroid burst/tessalon now for residual cough/wheeze from recent exacerbation, f/u in next week if not improving          Relevant Medications    umeclidinium-vilanterol (ANORO ELLIPTA) 62.5-25 MCG/INH AEPB inhaler    ipratropium-albuterol (DUONEB) 0.5-2.5 (3) MG/3ML SOLN nebulizer solution    albuterol sulfate HFA (VENTOLIN HFA) 108 (90 Base) MCG/ACT inhaler    predniSONE (DELTASONE) 20 MG tablet    benzonatate (TESSALON) 100 MG capsule       Other    Post-menopausal    Relevant Orders    DEXA Bone Density Axial Skeleton    Encounter for screening mammogram for breast cancer    Relevant Orders    JS Digital Screen Bilateral [[id number]]    Chronic midline low back pain without sciatica - Primary     OARRs reviewed, refill tramadol, Risks/benefits/SE reviewed, pt voices understanding  Recheck in 3 months          Relevant Medications    acetaminophen (TYLENOL) 325 MG tablet    tiZANidine (ZANAFLEX) 4 MG tablet    traMADol (ULTRAM) 50 MG tablet    predniSONE (DELTASONE) 20 MG tablet          Periodic Controlled Substance Monitoring: Possible medication side effects, risk of tolerance/dependence & alternative treatments discussed., No signs of potential drug abuse or diversion identified. Maurizio Cuadra PA-C)    Return in about 3 months (around 1/11/2022).

## 2021-10-12 ASSESSMENT — ENCOUNTER SYMPTOMS
BACK PAIN: 1
ABDOMINAL PAIN: 0
WHEEZING: 1
COUGH: 1

## 2021-10-12 NOTE — ASSESSMENT & PLAN NOTE
Continue current meds, steroid burst/tessalon now for residual cough/wheeze from recent exacerbation, f/u in next week if not improving

## 2021-10-12 NOTE — ASSESSMENT & PLAN NOTE
OARRs reviewed, refill tramadol, Risks/benefits/SE reviewed, pt voices understanding  Recheck in 3 months

## 2021-11-08 ENCOUNTER — TELEPHONE (OUTPATIENT)
Dept: FAMILY MEDICINE CLINIC | Age: 71
End: 2021-11-08

## 2021-11-08 DIAGNOSIS — J44.9 MODERATE COPD (CHRONIC OBSTRUCTIVE PULMONARY DISEASE) (HCC): ICD-10-CM

## 2021-11-08 RX ORDER — UMECLIDINIUM BROMIDE AND VILANTEROL TRIFENATATE 62.5; 25 UG/1; UG/1
1 POWDER RESPIRATORY (INHALATION) DAILY
Qty: 1 EACH | Refills: 5 | Status: SHIPPED | OUTPATIENT
Start: 2021-11-08 | End: 2022-03-10 | Stop reason: SDUPTHER

## 2021-11-10 PROBLEM — Z12.31 ENCOUNTER FOR SCREENING MAMMOGRAM FOR BREAST CANCER: Status: RESOLVED | Noted: 2021-10-11 | Resolved: 2021-11-10

## 2021-11-15 ENCOUNTER — NURSE TRIAGE (OUTPATIENT)
Dept: OTHER | Facility: CLINIC | Age: 71
End: 2021-11-15

## 2021-11-15 NOTE — TELEPHONE ENCOUNTER
Received call from [address] at [facility] with Red Flag Complaint. Brief description of triage: neck pain    Triage indicates for patient to see in office today. Pt instructed if unable to get an appointment to go to urgent care or walk in clinic. Agreeable. Care advice provided, patient verbalizes understanding; denies any other questions or concerns; instructed to call back for any new or worsening symptoms. Writer provided warm transfer to [facility] at [facility] for appointment scheduling. Attention Provider: Thank you for allowing me to participate in the care of your patient. The patient was connected to triage in response to information provided to the ECC/PSC. Please do not respond through this encounter as the response is not directed to a shared pool. Reason for Disposition   Patient wants to be seen    Answer Assessment - Initial Assessment Questions  1. ONSET: \"When did the pain begin? \"       3 weeks. Intermittent    2. LOCATION: \"Where does it hurt? \"       Back of right neck. Radiating up behind ear and into head. Causes dizziness 20-30 seconds. 3 episodes. 7-8 times radiating into right arm    3. PATTERN \"Does the pain come and go, or has it been constant since it started? \"       Intermittent neck dull pain. 4. SEVERITY: \"How bad is the pain? \"  (Scale 1-10; or mild, moderate, severe)    - MILD (1-3): doesn't interfere with normal activities     - MODERATE (4-7): interferes with normal activities or awakens from sleep     - SEVERE (8-10):  excruciating pain, unable to do any normal activities       Can get to 8-9/10. Denies pain at this time. Unknown triggers. 5. RADIATION: \"Does the pain go anywhere else, shoot into your arms? \"      Note above    6. CORD SYMPTOMS: \"Any weakness or numbness of the arms or legs? \"      States when the dull neck pain hits yes. Denies facial numbness or weakness    7. CAUSE: \"What do you think is causing the neck pain? \" Unsure    8. NECK OVERUSE: Sho Bold recent activities that involved turning or twisting the neck? \"      Denies    9. OTHER SYMPTOMS: \"Do you have any other symptoms? \" (e.g., headache, fever, chest pain, difficulty breathing, neck swelling)      No swelling of neck. No sore throat, no earache. Denies chest pain or SOB    10. PREGNANCY: \"Is there any chance you are pregnant? \" \"When was your last menstrual period? \"        Post menopausal.    Protocols used: NECK PAIN OR STIFFNESS-ADULT-[place]

## 2021-11-18 ENCOUNTER — HOSPITAL ENCOUNTER (OUTPATIENT)
Dept: CT IMAGING | Age: 71
Discharge: HOME | End: 2021-11-18
Payer: MEDICARE

## 2021-11-18 ENCOUNTER — OFFICE VISIT (OUTPATIENT)
Dept: FAMILY MEDICINE CLINIC | Age: 71
End: 2021-11-18
Payer: MEDICARE

## 2021-11-18 ENCOUNTER — TELEPHONE (OUTPATIENT)
Dept: FAMILY MEDICINE CLINIC | Age: 71
End: 2021-11-18

## 2021-11-18 VITALS
SYSTOLIC BLOOD PRESSURE: 118 MMHG | HEART RATE: 88 BPM | BODY MASS INDEX: 27.06 KG/M2 | RESPIRATION RATE: 16 BRPM | OXYGEN SATURATION: 95 % | TEMPERATURE: 98.1 F | DIASTOLIC BLOOD PRESSURE: 62 MMHG | WEIGHT: 134 LBS

## 2021-11-18 DIAGNOSIS — F32.1 CURRENT MODERATE EPISODE OF MAJOR DEPRESSIVE DISORDER WITHOUT PRIOR EPISODE (HCC): ICD-10-CM

## 2021-11-18 DIAGNOSIS — J44.1 COPD EXACERBATION (HCC): Primary | ICD-10-CM

## 2021-11-18 DIAGNOSIS — H92.01 MASTOID PAIN, RIGHT: ICD-10-CM

## 2021-11-18 DIAGNOSIS — R42 DIZZINESS: ICD-10-CM

## 2021-11-18 DIAGNOSIS — Z13.31 POSITIVE DEPRESSION SCREENING: ICD-10-CM

## 2021-11-18 PROCEDURE — G8926 SPIRO NO PERF OR DOC: HCPCS | Performed by: PHYSICIAN ASSISTANT

## 2021-11-18 PROCEDURE — G8427 DOCREV CUR MEDS BY ELIG CLIN: HCPCS | Performed by: PHYSICIAN ASSISTANT

## 2021-11-18 PROCEDURE — 99214 OFFICE O/P EST MOD 30 MIN: CPT | Performed by: PHYSICIAN ASSISTANT

## 2021-11-18 PROCEDURE — 3023F SPIROM DOC REV: CPT | Performed by: PHYSICIAN ASSISTANT

## 2021-11-18 PROCEDURE — 1036F TOBACCO NON-USER: CPT | Performed by: PHYSICIAN ASSISTANT

## 2021-11-18 PROCEDURE — 1090F PRES/ABSN URINE INCON ASSESS: CPT | Performed by: PHYSICIAN ASSISTANT

## 2021-11-18 PROCEDURE — 96372 THER/PROPH/DIAG INJ SC/IM: CPT | Performed by: PHYSICIAN ASSISTANT

## 2021-11-18 PROCEDURE — 3017F COLORECTAL CA SCREEN DOC REV: CPT | Performed by: PHYSICIAN ASSISTANT

## 2021-11-18 PROCEDURE — 1123F ACP DISCUSS/DSCN MKR DOCD: CPT | Performed by: PHYSICIAN ASSISTANT

## 2021-11-18 PROCEDURE — 70450 CT HEAD/BRAIN W/O DYE: CPT

## 2021-11-18 PROCEDURE — G8484 FLU IMMUNIZE NO ADMIN: HCPCS | Performed by: PHYSICIAN ASSISTANT

## 2021-11-18 PROCEDURE — G8417 CALC BMI ABV UP PARAM F/U: HCPCS | Performed by: PHYSICIAN ASSISTANT

## 2021-11-18 PROCEDURE — G8400 PT W/DXA NO RESULTS DOC: HCPCS | Performed by: PHYSICIAN ASSISTANT

## 2021-11-18 PROCEDURE — 4040F PNEUMOC VAC/ADMIN/RCVD: CPT | Performed by: PHYSICIAN ASSISTANT

## 2021-11-18 PROCEDURE — G8431 POS CLIN DEPRES SCRN F/U DOC: HCPCS | Performed by: PHYSICIAN ASSISTANT

## 2021-11-18 RX ORDER — ESCITALOPRAM OXALATE 10 MG/1
10 TABLET ORAL DAILY
Qty: 30 TABLET | Refills: 3 | Status: SHIPPED | OUTPATIENT
Start: 2021-11-18 | End: 2022-03-10

## 2021-11-18 RX ORDER — PREDNISONE 20 MG/1
20 TABLET ORAL DAILY
Qty: 5 TABLET | Refills: 1 | Status: SHIPPED | OUTPATIENT
Start: 2021-11-18 | End: 2021-12-08

## 2021-11-18 RX ORDER — DEXTROMETHORPHAN HYDROBROMIDE AND PROMETHAZINE HYDROCHLORIDE 15; 6.25 MG/5ML; MG/5ML
5 SYRUP ORAL 4 TIMES DAILY PRN
Qty: 240 ML | Refills: 0 | Status: SHIPPED | OUTPATIENT
Start: 2021-11-18 | End: 2021-11-25

## 2021-11-18 RX ORDER — CEFDINIR 300 MG/1
300 CAPSULE ORAL 2 TIMES DAILY
Qty: 20 CAPSULE | Refills: 0 | Status: SHIPPED | OUTPATIENT
Start: 2021-11-18 | End: 2021-11-28

## 2021-11-18 RX ORDER — BETAMETHASONE SODIUM PHOSPHATE AND BETAMETHASONE ACETATE 3; 3 MG/ML; MG/ML
12 INJECTION, SUSPENSION INTRA-ARTICULAR; INTRALESIONAL; INTRAMUSCULAR; SOFT TISSUE ONCE
Status: COMPLETED | OUTPATIENT
Start: 2021-11-18 | End: 2021-11-18

## 2021-11-18 RX ADMIN — BETAMETHASONE SODIUM PHOSPHATE AND BETAMETHASONE ACETATE 12 MG: 3; 3 INJECTION, SUSPENSION INTRA-ARTICULAR; INTRALESIONAL; INTRAMUSCULAR; SOFT TISSUE at 08:30

## 2021-11-18 ASSESSMENT — PATIENT HEALTH QUESTIONNAIRE - PHQ9
SUM OF ALL RESPONSES TO PHQ QUESTIONS 1-9: 11
8. MOVING OR SPEAKING SO SLOWLY THAT OTHER PEOPLE COULD HAVE NOTICED. OR THE OPPOSITE, BEING SO FIGETY OR RESTLESS THAT YOU HAVE BEEN MOVING AROUND A LOT MORE THAN USUAL: 0
SUM OF ALL RESPONSES TO PHQ9 QUESTIONS 1 & 2: 5
SUM OF ALL RESPONSES TO PHQ QUESTIONS 1-9: 11
6. FEELING BAD ABOUT YOURSELF - OR THAT YOU ARE A FAILURE OR HAVE LET YOURSELF OR YOUR FAMILY DOWN: 0
SUM OF ALL RESPONSES TO PHQ QUESTIONS 1-9: 11
7. TROUBLE CONCENTRATING ON THINGS, SUCH AS READING THE NEWSPAPER OR WATCHING TELEVISION: 0
9. THOUGHTS THAT YOU WOULD BE BETTER OFF DEAD, OR OF HURTING YOURSELF: 0
3. TROUBLE FALLING OR STAYING ASLEEP: 3
10. IF YOU CHECKED OFF ANY PROBLEMS, HOW DIFFICULT HAVE THESE PROBLEMS MADE IT FOR YOU TO DO YOUR WORK, TAKE CARE OF THINGS AT HOME, OR GET ALONG WITH OTHER PEOPLE: 1
2. FEELING DOWN, DEPRESSED OR HOPELESS: 2
1. LITTLE INTEREST OR PLEASURE IN DOING THINGS: 3
5. POOR APPETITE OR OVEREATING: 0
4. FEELING TIRED OR HAVING LITTLE ENERGY: 3

## 2021-11-18 ASSESSMENT — ENCOUNTER SYMPTOMS
COUGH: 1
WHEEZING: 1
ABDOMINAL PAIN: 0
SHORTNESS OF BREATH: 1

## 2021-11-18 ASSESSMENT — COLUMBIA-SUICIDE SEVERITY RATING SCALE - C-SSRS
1. WITHIN THE PAST MONTH, HAVE YOU WISHED YOU WERE DEAD OR WISHED YOU COULD GO TO SLEEP AND NOT WAKE UP?: NO
2. HAVE YOU ACTUALLY HAD ANY THOUGHTS OF KILLING YOURSELF?: NO
6. HAVE YOU EVER DONE ANYTHING, STARTED TO DO ANYTHING, OR PREPARED TO DO ANYTHING TO END YOUR LIFE?: NO

## 2021-11-18 NOTE — TELEPHONE ENCOUNTER
Let pt know CT negative for acute findings. Does have some thickening in the mastoid cells but it is stable. I would still recommend ENT f/u at this time.

## 2021-11-18 NOTE — ASSESSMENT & PLAN NOTE
Steroid burst now, increase duonebs to q6 hours, ATB as directed, f/u in next few days if not improving

## 2021-11-18 NOTE — TELEPHONE ENCOUNTER
Ct department called with STAT CT results. Impression: no acute intracranial abnormality. stable partial calcification right mastoid air cells      Brain/ventricles: No acute abnormality of the visualized skull or soft   tissues. Sinuses: Stable partial opacification right mastoid air cells.  Mucosal   thickening within bilateral partially visualized maxillary sinuses.    Remainder paranasal sinuses and left mastoid air cells are clear

## 2021-11-18 NOTE — ASSESSMENT & PLAN NOTE
Add lexapro, Risks/benefits/SE reviewed, pt voices understanding  Reviewed sxs serotonin syndrome with pt and interventions needed/when to present to office or ER

## 2021-11-18 NOTE — TELEPHONE ENCOUNTER
Called patient and advised of image results. Advised pt to follow up with ENT. Pt verbalized understanding.

## 2022-01-24 ENCOUNTER — TELEPHONE (OUTPATIENT)
Dept: FAMILY MEDICINE CLINIC | Age: 72
End: 2022-01-24

## 2022-01-24 RX ORDER — TIZANIDINE 4 MG/1
4 TABLET ORAL EVERY 8 HOURS PRN
Qty: 40 TABLET | Refills: 2 | Status: SHIPPED | OUTPATIENT
Start: 2022-01-24 | End: 2022-03-07 | Stop reason: SDUPTHER
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
## 2022-01-24 NOTE — TELEPHONE ENCOUNTER
I attempted to call pt on both numbers listed. I was unable to leave a voice mail. Mail box was full    Gelsyn injections are in stock and labeled for patient. If pt called back please schedule. Gelsyn 1x/wk for 3 weeks. Left knee  No known drug allergies listed in chart. [address]  [address] [address]  Dept: [phone]  Dept Fax: [phone]  Loc: [phone]    Kelsie Fernandez  is a 80 y.o. female who presents today for her medical conditions/complaints as noted below. Kelsie Fernandez is c/o of     Chief Complaint   Patient presents with    Cough    Anxiety    Hypertension    Sinus Problem       HPI:   Smiley Vick is being seen for her regular monthly follow up while at [facility]. Overall, she is doing about the same. She did not have any specific complaints today. She continues to participate in multiple of the social activities and she still seems to really enjoy them. She continues to complain of problems with headaches, sinus congestion, and shortness of breath. This is all at her baseline and nothing has really worsened in the last several months. She continues to get good relief from her albuterol, and she does not cough a whole lot it is mostly that she just feels short of breath and tight. She has not been overly anxious in the last month or so. No recent changes in her paranoia and general concerns. She continues to periodically blame staff and other residents for stealing things, but this is pretty normal for her and she has been doing this for several years. Overall, she feels that she is doing about the same as always and she is fairly happy about this.      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 TC, RRT 4/20/18   Substance Use Topics    Alcohol use: No     Alcohol/week: 0.0 standard drinks      Prior to Visit Medications    Medication Sig Taking?  Authorizing Provider   ciprofloxacin (CIPRO) 500 MG tablet Take 1 tablet by mouth 2 times daily  Jose Stanley MD   mirtazapine (REMERON) 7.5 MG tablet   Historical Provider, MD   busPIRone (BUSPAR) 7.5 MG tablet   Historical Provider, MD   acetaminophen (TYLENOL) 325 MG tablet Take 650 mg by mouth 2 times daily  Historical Provider, MD   albuterol (PROVENTIL) (2.5 MG/3ML) 0.083% nebulizer solution Take 2.5 mg by nebulization 2 times daily  Historical Provider, MD   acetaminophen (TYLENOL) 500 MG tablet Take 500 mg by mouth every 4 hours as needed for Pain or Fever  Historical Provider, MD   benzonatate (TESSALON) 200 MG capsule Take 200 mg by mouth 3 times daily as needed for Cough  Historical Provider, MD   guaiFENesin (ROBITUSSIN) 100 MG/5ML syrup Take 200 mg by mouth 4 times daily as needed for Cough or Congestion  Historical Provider, MD   PARoxetine (PAXIL) 10 MG tablet Take 10 mg by mouth nightly  Historical Provider, MD   busPIRone (BUSPAR) 10 MG tablet Take 2 tablets by mouth 3 times daily  Patient taking differently: Take 7.5 mg by mouth nightly 10mg in morning, 7.5mg at night  Jose Stanley MD   lisinopril (PRINIVIL;ZESTRIL) 5 MG tablet Take 1 tablet by mouth daily  Jamila Orr   metoprolol tartrate (LOPRESSOR) 25 MG tablet Take 1 tablet by mouth 2 times daily  Cookie Tesfaye   bethanechol (URECHOLINE) 25 MG tablet Take 1 tablet by mouth 4 times daily  Jamila Orr   Multiple Vitamin (MULTI VITAMIN DAILY) TABS Take 1 tablet by mouth daily Take 1 tab po daily  Paul Campbell MD   fluticasone (FLONASE) 50 MCG/ACT nasal spray 1 spray by Nasal route daily  Paul Campbell MD   hydrochlorothiazide (MICROZIDE) 12.5 MG capsule take 1 capsule by mouth once daily  Paul Campbell MD   Calcium Carbonate (CALCIUM 600 PO) Take 600 mg by mouth 2 times daily  Historical Provider, MD   loratadine (CLARITIN) 10 MG capsule Take 1 capsule by mouth daily  Paul Campbell MD   isosorbide mononitrate (IMDUR) 30 MG extended release tablet Take 1 tablet by mouth daily  Paul Campbell MD   potassium chloride (KLOR-CON M) 20 MEQ extended release tablet Take 1 tablet by mouth 2 times daily  Paul Campbell MD   aspirin 81 MG chewable tablet Take 1 tablet by mouth daily  Paul Campbell MD   budesonide-formoterol (SYMBICORT) 80-4.5 MCG/ACT AERO Inhale 2 puffs into the lungs 2 times daily  Jerzy Payne MD   Cholecalciferol (VITAMIN D) 2000 UNITS CAPS capsule Take 4,000 Units by mouth daily  Historical Provider, MD   nitroGLYCERIN (NITROSTAT) 0.4 MG SL tablet Place 1 tablet under the tongue every 5 minutes as needed for Chest pain  Dasia Wright MD   aluminum & magnesium hydroxide-simethicone (MAALOX REGULAR STRENGTH) 200-200-20 MG/5ML SUSP suspension Take 30 mLs by mouth every 6 hours as needed for Ryan Davis MD   mirtazapine (REMERON) 15 MG tablet Take 0.5 tablets by mouth nightly  Paul Campbell MD   levothyroxine (SYNTHROID) 50 MCG tablet Take 1 tablet by mouth Daily  Paul Campbell MD   donepezil (ARICEPT) 10 MG tablet Take 10 mg by mouth daily  Historical Provider, MD   simethicone (MYLICON) 439 MG chewable tablet Take 125 mg by mouth every 6 hours as needed for Flatulence  Historical Provider, MD   OXYGEN Inhale 2 L/min into the lungs nightly.   Historical Provider, MD     Allergies   Allergen Reactions    Amlodipine Other (See Comments)     Pt says she got all the side effects      Sulfa Antibiotics     Cleocin [Clindamycin] Rash    Macrolides And Ketolides Rash     \"All mycins\"    Pcn [Penicillins] Rash    Tetracyclines & Related Rash    Zithromax [Azithromycin] Rash       Subjective:      Review of Systems   Constitutional: Negative for activity change, appetite change, chills, fatigue and fever. Respiratory: Negative for cough, chest tightness, shortness of breath and wheezing. Cardiovascular: Negative for chest pain, palpitations and leg swelling. Gastrointestinal: Negative for abdominal pain, constipation and diarrhea. Genitourinary: Negative for difficulty urinating. Skin: Negative for rash. Neurological: Negative for dizziness, syncope, weakness, light-headedness and headaches. Psychiatric/Behavioral: Positive for confusion and decreased concentration. Negative for behavioral problems, dysphoric mood and sleep disturbance. The patient is not nervous/anxious. Objective:     Physical Exam  Vitals and nursing note reviewed. Constitutional:       General: She is not in acute distress. Appearance: She is well-developed. Eyes:      Conjunctiva/sclera: Conjunctivae normal.   Neck:      Thyroid: No thyromegaly. Cardiovascular:      Rate and Rhythm: Normal rate and regular rhythm. Heart sounds: Normal heart sounds. No murmur heard. Pulmonary:      Effort: Pulmonary effort is normal. No respiratory distress. Breath sounds: Normal breath sounds. No wheezing. Abdominal:      General: Abdomen is flat. Bowel sounds are normal.      Palpations: Abdomen is soft. Tenderness: There is no abdominal tenderness. There is no guarding or rebound. Musculoskeletal:      Cervical back: Normal range of motion and neck supple. Lymphadenopathy:      Cervical: No cervical adenopathy. Skin:     General: Skin is warm and dry. Findings: No erythema or rash. Neurological:      General: No focal deficit present.       Mental

## 2022-03-04 ENCOUNTER — TELEPHONE (OUTPATIENT)
Dept: FAMILY MEDICINE CLINIC | Age: 72
End: 2022-03-04

## 2022-03-04 RX ORDER — PREDNISONE 20 MG/1
20 TABLET ORAL DAILY
Qty: 5 TABLET | Refills: 0 | Status: SHIPPED | OUTPATIENT
Start: 2022-03-04 | End: 2022-04-08 | Stop reason: SDUPTHER

## 2022-03-04 NOTE — TELEPHONE ENCOUNTER
Let pt know I will send in steroid, f/u here next week if not improving, Er if worse DISPLAY PLAN FREE TEXT

## 2022-03-04 NOTE — TELEPHONE ENCOUNTER
Spoke with pt and advised of PCP's note/recommendations. Pt verbalized understanding.  No other questions or concerns at this time

## 2022-03-07 RX ORDER — TIZANIDINE 4 MG/1
TABLET ORAL
Qty: 40 TABLET | Refills: 2 | Status: SHIPPED | OUTPATIENT
Start: 2022-03-07

## 2022-03-10 ENCOUNTER — HOSPITAL ENCOUNTER (OUTPATIENT)
Age: 72
Discharge: HOME | End: 2022-03-10
Payer: MEDICARE

## 2022-03-10 ENCOUNTER — OFFICE VISIT (OUTPATIENT)
Dept: FAMILY MEDICINE CLINIC | Age: 72
End: 2022-03-10
Payer: MEDICARE

## 2022-03-10 VITALS
RESPIRATION RATE: 18 BRPM | SYSTOLIC BLOOD PRESSURE: 120 MMHG | WEIGHT: 131 LBS | TEMPERATURE: 97.9 F | HEART RATE: 80 BPM | OXYGEN SATURATION: 94 % | BODY MASS INDEX: 26.46 KG/M2 | DIASTOLIC BLOOD PRESSURE: 78 MMHG

## 2022-03-10 DIAGNOSIS — E27.8 LEFT ADRENAL MASS (HCC): ICD-10-CM

## 2022-03-10 DIAGNOSIS — F32.1 CURRENT MODERATE EPISODE OF MAJOR DEPRESSIVE DISORDER WITHOUT PRIOR EPISODE (HCC): ICD-10-CM

## 2022-03-10 DIAGNOSIS — F51.01 PRIMARY INSOMNIA: ICD-10-CM

## 2022-03-10 DIAGNOSIS — J44.1 CHRONIC OBSTRUCTIVE PULMONARY DISEASE WITH ACUTE EXACERBATION (HCC): ICD-10-CM

## 2022-03-10 DIAGNOSIS — G51.4 FACIAL TWITCHING: ICD-10-CM

## 2022-03-10 DIAGNOSIS — J44.1 COPD EXACERBATION (HCC): Primary | ICD-10-CM

## 2022-03-10 DIAGNOSIS — I20.8 STABLE ANGINA PECTORIS (HCC): ICD-10-CM

## 2022-03-10 DIAGNOSIS — L98.9 SKIN LESION: ICD-10-CM

## 2022-03-10 DIAGNOSIS — G89.29 CHRONIC MIDLINE LOW BACK PAIN WITHOUT SCIATICA: ICD-10-CM

## 2022-03-10 DIAGNOSIS — I71.40 ABDOMINAL AORTIC ANEURYSM (AAA) WITHOUT RUPTURE: ICD-10-CM

## 2022-03-10 DIAGNOSIS — R29.810 FACIAL DROOP: ICD-10-CM

## 2022-03-10 DIAGNOSIS — Z79.899 MEDICATION MANAGEMENT: ICD-10-CM

## 2022-03-10 DIAGNOSIS — M54.50 CHRONIC MIDLINE LOW BACK PAIN WITHOUT SCIATICA: ICD-10-CM

## 2022-03-10 DIAGNOSIS — R53.83 OTHER FATIGUE: ICD-10-CM

## 2022-03-10 LAB
ALBUMIN SERPL-MCNC: 4.2 GM/DL (ref 3.4–5)
ALCOHOL URINE: NORMAL
ALP BLD-CCNC: 80 IU/L (ref 40–129)
ALT SERPL-CCNC: 6 U/L (ref 10–40)
AMPHETAMINE SCREEN, URINE: NEGATIVE
ANION GAP SERPL CALCULATED.3IONS-SCNC: 13 MMOL/L (ref 4–16)
AST SERPL-CCNC: 9 IU/L (ref 15–37)
BARBITURATE SCREEN, URINE: NEGATIVE
BENZODIAZEPINE SCREEN, URINE: NEGATIVE
BILIRUB SERPL-MCNC: 0.3 MG/DL (ref 0–1)
BUN BLDV-MCNC: 9 MG/DL (ref 6–23)
BUPRENORPHINE URINE: NEGATIVE
CALCIUM SERPL-MCNC: 9.4 MG/DL (ref 8.3–10.6)
CHLORIDE BLD-SCNC: 107 MMOL/L (ref 99–110)
CO2: 22 MMOL/L (ref 21–32)
COCAINE METABOLITE SCREEN URINE: NEGATIVE
CREAT SERPL-MCNC: 0.7 MG/DL (ref 0.6–1.1)
FENTANYL SCREEN, URINE: NORMAL
GABAPENTIN SCREEN, URINE: NORMAL
GFR AFRICAN AMERICAN: >60 ML/MIN/1.73M2
GFR NON-AFRICAN AMERICAN: >60 ML/MIN/1.73M2
GLUCOSE FASTING: 95 MG/DL (ref 70–99)
MAGNESIUM: 2.1 MG/DL (ref 1.8–2.4)
MDMA URINE: NEGATIVE
METHADONE SCREEN, URINE: NORMAL
METHAMPHETAMINE, URINE: NEGATIVE
OPIATE SCREEN URINE: NEGATIVE
OXYCODONE SCREEN URINE: NEGATIVE
PHENCYCLIDINE SCREEN URINE: NEGATIVE
POTASSIUM SERPL-SCNC: 4.6 MMOL/L (ref 3.5–5.1)
PROPOXYPHENE SCREEN, URINE: NORMAL
SODIUM BLD-SCNC: 142 MMOL/L (ref 135–145)
SYNTHETIC CANNABINOIDS(K2) SCREEN, URINE: NORMAL
T4 FREE: 0.93 NG/DL (ref 0.9–1.8)
THC SCREEN, URINE: NEGATIVE
TOTAL PROTEIN: 6.3 GM/DL (ref 6.4–8.2)
TRAMADOL SCREEN URINE: NORMAL
TRICYCLIC ANTIDEPRESSANTS, UR: NORMAL
TSH HIGH SENSITIVITY: 3.92 UIU/ML (ref 0.27–4.2)

## 2022-03-10 PROCEDURE — 83735 ASSAY OF MAGNESIUM: CPT

## 2022-03-10 PROCEDURE — 3017F COLORECTAL CA SCREEN DOC REV: CPT | Performed by: PHYSICIAN ASSISTANT

## 2022-03-10 PROCEDURE — 80053 COMPREHEN METABOLIC PANEL: CPT

## 2022-03-10 PROCEDURE — 1090F PRES/ABSN URINE INCON ASSESS: CPT | Performed by: PHYSICIAN ASSISTANT

## 2022-03-10 PROCEDURE — G8400 PT W/DXA NO RESULTS DOC: HCPCS | Performed by: PHYSICIAN ASSISTANT

## 2022-03-10 PROCEDURE — 84439 ASSAY OF FREE THYROXINE: CPT

## 2022-03-10 PROCEDURE — 4040F PNEUMOC VAC/ADMIN/RCVD: CPT | Performed by: PHYSICIAN ASSISTANT

## 2022-03-10 PROCEDURE — G8417 CALC BMI ABV UP PARAM F/U: HCPCS | Performed by: PHYSICIAN ASSISTANT

## 2022-03-10 PROCEDURE — 3023F SPIROM DOC REV: CPT | Performed by: PHYSICIAN ASSISTANT

## 2022-03-10 PROCEDURE — 1036F TOBACCO NON-USER: CPT | Performed by: PHYSICIAN ASSISTANT

## 2022-03-10 PROCEDURE — 96372 THER/PROPH/DIAG INJ SC/IM: CPT | Performed by: PHYSICIAN ASSISTANT

## 2022-03-10 PROCEDURE — 84443 ASSAY THYROID STIM HORMONE: CPT

## 2022-03-10 PROCEDURE — 36415 COLL VENOUS BLD VENIPUNCTURE: CPT

## 2022-03-10 PROCEDURE — 1123F ACP DISCUSS/DSCN MKR DOCD: CPT | Performed by: PHYSICIAN ASSISTANT

## 2022-03-10 PROCEDURE — 99214 OFFICE O/P EST MOD 30 MIN: CPT | Performed by: PHYSICIAN ASSISTANT

## 2022-03-10 PROCEDURE — G8484 FLU IMMUNIZE NO ADMIN: HCPCS | Performed by: PHYSICIAN ASSISTANT

## 2022-03-10 PROCEDURE — G8427 DOCREV CUR MEDS BY ELIG CLIN: HCPCS | Performed by: PHYSICIAN ASSISTANT

## 2022-03-10 PROCEDURE — 80305 DRUG TEST PRSMV DIR OPT OBS: CPT | Performed by: PHYSICIAN ASSISTANT

## 2022-03-10 RX ORDER — TRAMADOL HYDROCHLORIDE 50 MG/1
50 TABLET ORAL EVERY 6 HOURS PRN
COMMUNITY
End: 2022-03-10

## 2022-03-10 RX ORDER — TRAMADOL HYDROCHLORIDE 50 MG/1
50 TABLET ORAL EVERY 8 HOURS PRN
Qty: 90 TABLET | Refills: 2 | Status: SHIPPED | OUTPATIENT
Start: 2022-03-10 | End: 2022-05-20 | Stop reason: SDUPTHER

## 2022-03-10 RX ORDER — TRAMADOL HYDROCHLORIDE 50 MG/1
50 TABLET ORAL EVERY 8 HOURS PRN
Qty: 90 TABLET | Refills: 2 | Status: CANCELLED | OUTPATIENT
Start: 2022-03-10 | End: 2022-06-08

## 2022-03-10 RX ORDER — TRAZODONE HYDROCHLORIDE 100 MG/1
TABLET ORAL
Qty: 60 TABLET | Refills: 5 | Status: SHIPPED | OUTPATIENT
Start: 2022-03-10 | End: 2022-03-28

## 2022-03-10 RX ORDER — UMECLIDINIUM BROMIDE AND VILANTEROL TRIFENATATE 62.5; 25 UG/1; UG/1
1 POWDER RESPIRATORY (INHALATION) DAILY
Qty: 1 EACH | Refills: 5 | Status: SHIPPED | OUTPATIENT
Start: 2022-03-10 | End: 2022-10-24

## 2022-03-10 RX ORDER — BETAMETHASONE SODIUM PHOSPHATE AND BETAMETHASONE ACETATE 3; 3 MG/ML; MG/ML
12 INJECTION, SUSPENSION INTRA-ARTICULAR; INTRALESIONAL; INTRAMUSCULAR; SOFT TISSUE ONCE
Status: COMPLETED | OUTPATIENT
Start: 2022-03-10 | End: 2022-03-10

## 2022-03-10 RX ADMIN — BETAMETHASONE SODIUM PHOSPHATE AND BETAMETHASONE ACETATE 12 MG: 3; 3 INJECTION, SUSPENSION INTRA-ARTICULAR; INTRALESIONAL; INTRAMUSCULAR; SOFT TISSUE at 09:32

## 2022-03-10 ASSESSMENT — ENCOUNTER SYMPTOMS
SHORTNESS OF BREATH: 0
EYE ITCHING: 0
SINUS PAIN: 0
EYE PAIN: 0
TROUBLE SWALLOWING: 0
EYE DISCHARGE: 0
BACK PAIN: 1
ABDOMINAL PAIN: 0
FACIAL SWELLING: 0
SINUS PRESSURE: 0
COUGH: 0

## 2022-03-10 ASSESSMENT — PATIENT HEALTH QUESTIONNAIRE - PHQ9
1. LITTLE INTEREST OR PLEASURE IN DOING THINGS: 0
SUM OF ALL RESPONSES TO PHQ QUESTIONS 1-9: 0
SUM OF ALL RESPONSES TO PHQ9 QUESTIONS 1 & 2: 0
2. FEELING DOWN, DEPRESSED OR HOPELESS: 0
SUM OF ALL RESPONSES TO PHQ QUESTIONS 1-9: 0

## 2022-03-10 NOTE — PROGRESS NOTES
Dhruv Swift  1950  67 y.o.  female    SUBJECTIVE:    Chief Complaint   Patient presents with    Shortness of Breath     States that she has been using her inhaler. States that SOB started last week    Headache     C/o HA since Friday    Nasal Congestion    Other     C/o facial twitching. States that twitching happens to her R eye and R mouth       HPI   Pt here today for routine f/u but does report concern for facial muscle twitching. States it has been present for 1-2 months, slightly worse in last week. Typically occurs when closing eyes. Notices tightness/twictching around right eye and right mouth area. Slight drooping of right side of face near mouth noted today. Pt states this has been present also for approx a month. Denies weakness/numbness/slurred speech/vision changes. COPD-noticed increase sob/cough one  week ago. Has had to increase nebulizer use due to increased wheezing. Did call in last week for steroids, they were somewhat effective. Compliant with meds. Insomnia-primary, doing well with trazodone. Depression-much improved since  Adjusting medication several months ago. AAA-follows with Dr. Tim Bonner for routine monitoring    Skin lesion-left upper thigh, pt states area used to be flat but now has grown to elevated dry flaking lesion    Chronic back pain-pt reports chronic low back pain/OA herb hands. Pain severe at times and causes difficulty with ADLs and leading to poorer quality of life due to pain. Pt is raising great grandchildren and pain control allows her to remain functional and able to provide care to great grandchildren. States she recently did establish for medical marijuana for better pain control and filled it but is too afraid to try it now.      Angina-stable, pt denies recent cp/sob, follows with cardiology    Renal mass-left, stable as of 2/2021    PHQ Scores 3/10/2022 11/18/2021 10/11/2021 7/8/2021 4/6/2021 11/12/2020 10/1/2020   PHQ2 Score 0 5 2 2 2 0 2 PHQ9 Score 0 11 2 2 2 0 2     Interpretation of Total Score Depression Severity: 1-4 = Minimal depression, 5-9 = Mild depression, 10-14 = Moderate depression, 15-19 = Moderately severe depression, 20-27 = Severe depression     Current Outpatient Medications on File Prior to Visit   Medication Sig Dispense Refill    tiZANidine (ZANAFLEX) 4 MG tablet TAKE 1 TABLET BY MOUTH EVERY 8 HOURS AS NEEDED FOR MUSCLE SPASMS 40 tablet 2    ipratropium-albuterol (DUONEB) 0.5-2.5 (3) MG/3ML SOLN nebulizer solution Take 3 mLs by nebulization 4 times daily 360 mL 0    albuterol sulfate HFA (VENTOLIN HFA) 108 (90 Base) MCG/ACT inhaler Inhale 2 puffs into the lungs every 6 hours as needed for Wheezing 1 each 5    busPIRone (BUSPAR) 5 MG tablet Take 1 tablet by mouth 3 times daily 90 tablet 0    buPROPion (WELLBUTRIN XL) 300 MG extended release tablet TAKE ONE TABLET BY MOUTH IN THE MORNING 30 tablet 3    atorvastatin (LIPITOR) 10 MG tablet Take 1 tablet by mouth daily (Patient not taking: Reported on 3/10/2022) 90 tablet 1     No current facility-administered medications on file prior to visit. No Known Allergies    Past Medical History:   Diagnosis Date    Aneurysm of abdominal aorta (HCC)     Asthma     Back ache     reason for taking tramadol    COPD (chronic obstructive pulmonary disease) ([facility] [address]     H/O echocardiogram 02/18/2020    EF 50-55%. Sclerotic, but non-stenotic aortic valve. Moderate AR. Mild TR, MR.     History of nuclear stress test 02/18/2020    EF 75%. This is a normal study.     Needs flu shot 9/28/2018       Past Surgical History:   Procedure Laterality Date    APPENDECTOMY      HYSTERECTOMY      MIDDLE EAR SURGERY         Social History     Socioeconomic History    Marital status: Single     Spouse name: None    Number of children: None    Years of education: None    Highest education level: None   Occupational History    None   Tobacco Use    Smoking status: Former Smoker Packs/day: 1.00     Years: 30.00     Pack years: 30.00     Quit date: 2015     Years since quittin.2    Smokeless tobacco: Never Used   Vaping Use    Vaping Use: Never used   Substance and Sexual Activity    Alcohol use: No    Drug use: No    Sexual activity: Never   Other Topics Concern    None   Social History Narrative    None     Social Determinants of Health     Financial Resource Strain: Low Risk     Difficulty of Paying Living Expenses: Not hard at all   Food Insecurity: No Food Insecurity    Worried About Running Out of Food in the Last Year: Never true    Amor of Food in the Last Year: Never true   Transportation Needs:     Lack of Transportation (Medical): Not on file    Lack of Transportation (Non-Medical): Not on file   Physical Activity:     Days of Exercise per Week: Not on file    Minutes of Exercise per Session: Not on file   Stress:     Feeling of Stress : Not on file   Social Connections:     Frequency of Communication with Friends and Family: Not on file    Frequency of Social Gatherings with Friends and Family: Not on file    Attends [employer] Services: Not on file    Active Member of [address] [place] [address] [employer] or Organizations: Not on file    Attends Club or Organization Meetings: Not on file    Marital Status: Not on file   Intimate Partner Violence:     Fear of Current or Ex-Partner: Not on file    Emotionally Abused: Not on file    Physically Abused: Not on file    Sexually Abused: Not on file   Housing Stability:     Unable to Pay for Housing in the Last Year: Not on file    Number of [place] in the Last Year: Not on file    Unstable Housing in the Last Year: Not on file       Review of Systems   Constitutional: Positive for fatigue. Negative for chills, fever and unexpected weight change. HENT: Positive for congestion and postnasal drip. Negative for facial swelling, sinus pressure, sinus pain, tinnitus and trouble swallowing.     Eyes: Negative for pain, discharge, itching and visual disturbance. Respiratory: Negative for cough and shortness of breath. Cardiovascular: Negative for chest pain, palpitations and leg swelling. Gastrointestinal: Negative for abdominal pain. Endocrine: Negative for cold intolerance, heat intolerance, polydipsia, polyphagia and polyuria. Musculoskeletal: Positive for arthralgias and back pain. Skin: Negative for rash. Neurological: Negative for dizziness, tremors, syncope, speech difficulty, weakness, light-headedness, numbness and headaches. Hematological: Negative. Psychiatric/Behavioral: Positive for sleep disturbance. Negative for dysphoric mood. The patient is not nervous/anxious. OBJECTIVE:    /78 (Site: Left Upper Arm, Position: Sitting, Cuff Size: Medium Adult)   Pulse 80   Temp 97.9 °F (36.6 °C)   Resp 18   Wt 131 lb (59.4 kg)   SpO2 94%   BMI 26.46 kg/m²     Physical Exam  Vitals reviewed. Constitutional:       General: She is not in acute distress. Appearance: Normal appearance. HENT:      Head: Normocephalic. Right Ear: External ear normal.      Left Ear: External ear normal.      Mouth/Throat:      Lips: Pink. Mouth: Mucous membranes are moist.      Tongue: Tongue does not deviate from midline. Pharynx: Oropharynx is clear. Eyes:      General: Lids are normal.      Extraocular Movements: Extraocular movements intact. Conjunctiva/sclera: Conjunctivae normal.      Pupils: Pupils are equal, round, and reactive to light. Comments: Slight twitching right eye area noted x 2 during exam today, lasted 1-2 seconds each time   Cardiovascular:      Rate and Rhythm: Normal rate and regular rhythm. Heart sounds: Normal heart sounds. Pulmonary:      Effort: Pulmonary effort is normal.      Breath sounds: Wheezing present. Musculoskeletal:         General: Tenderness present. Normal range of motion. Cervical back: Normal range of motion and neck supple. No tenderness. Lymphadenopathy:      Cervical: No cervical adenopathy. Skin:     General: Skin is warm and dry. Neurological:      General: No focal deficit present. Mental Status: She is alert and oriented to person, place, and time. Mental status is at baseline. Cranial Nerves: No cranial nerve deficit. Sensory: No sensory deficit. Motor: No weakness.       Gait: Gait normal.      Deep Tendon Reflexes: Reflexes normal.   Psychiatric:         Mood and Affect: Mood normal.         [facility]:    Problem List        Circulatory    Stable angina pectoris ([facility])    Relevant Medications    buPROPion (WELLBUTRIN XL) 300 MG extended release tablet    tiZANidine (ZANAFLEX) 4 MG tablet    traZODone (DESYREL) 100 MG tablet    traMADol (ULTRAM) 50 MG tablet    Abdominal aortic aneurysm (AAA) without rupture ([facility])       Respiratory    COPD exacerbation ([facility] [address] - Primary     See above          Relevant Medications    ipratropium-albuterol (DUONEB) 0.5-2.5 (3) MG/3ML SOLN nebulizer solution    albuterol sulfate HFA (VENTOLIN HFA) 108 (90 Base) MCG/ACT inhaler    umeclidinium-vilanterol (ANORO ELLIPTA) 62.5-25 MCG/INH AEPB inhaler    COPD (chronic obstructive pulmonary disease) ([facility])     Continue current meds , just finished po steroids, will do celestone injection now, complete duonebs q 4-6 hours routinely for next few days, ER if worse         Relevant Medications    ipratropium-albuterol (DUONEB) 0.5-2.5 (3) MG/3ML SOLN nebulizer solution    albuterol sulfate HFA (VENTOLIN HFA) 108 (90 Base) MCG/ACT inhaler    umeclidinium-vilanterol (ANORO ELLIPTA) 62.5-25 MCG/INH AEPB inhaler       Musculoskeletal and Integument    Skin lesion    Relevant Orders    [facility] - Mayte Vasquez MD, Dermatology, Fabi Cummings       Other    Primary insomnia    Relevant Medications    traZODone (DESYREL) 100 MG tablet    Other fatigue    Relevant Orders    TSH with Reflex to FT4    Medication management    Relevant Orders    POCT Rapid Drug Screen (Completed)    Left adrenal mass (HCC) (Chronic)     Pt declines imaging at this time. Aware of risks/benefits/SE          Facial twitching    Relevant Orders    Comprehensive Metabolic Panel    TSH with Reflex to FT4    Magnesium    CT HEAD WO CONTRAST    Facial droop     Does not appear to be acute and no sxs acute CVA but will get CT no contrast, reviewed sxs for which to present to ER           Relevant Orders    CT HEAD WO CONTRAST    Current moderate episode of major depressive disorder without prior episode ([address]     Self stopped lexapro, doing well at this time          Relevant Medications    buPROPion (WELLBUTRIN XL) 300 MG extended release tablet    busPIRone (BUSPAR) 5 MG tablet    traZODone (DESYREL) 100 MG tablet    Chronic midline low back pain without sciatica     Will get drug screen now, if negative then will increase tramadol to tid as she is having breakthrough pain during afternoon. Advised she is not to start medical marijuana at this time. Voices understanding and assures PCP that she has no plans to use it . Relevant Medications    tiZANidine (ZANAFLEX) 4 MG tablet    traMADol (ULTRAM) 50 MG tablet          Periodic Controlled Substance Monitoring: Possible medication side effects, risk of tolerance/dependence & alternative treatments discussed. ,No signs of potential drug abuse or diversion identified. Isis Kaur PA-C)    Return in about 3 months (around 6/10/2022).

## 2022-03-10 NOTE — ASSESSMENT & PLAN NOTE
Continue current meds , just finished po steroids, will do celestone injection now, complete duonebs q 4-6 hours routinely for next few days, ER if worse

## 2022-03-10 NOTE — ASSESSMENT & PLAN NOTE
Does not appear to be acute and no sxs acute CVA but will get CT no contrast, reviewed sxs for which to present to ER

## 2022-03-10 NOTE — ASSESSMENT & PLAN NOTE
Will get drug screen now, if negative then will increase tramadol to tid as she is having breakthrough pain during afternoon. Advised she is not to start medical marijuana at this time. Voices understanding and assures PCP that she has no plans to use it .

## 2022-03-11 ENCOUNTER — TELEPHONE (OUTPATIENT)
Dept: FAMILY MEDICINE CLINIC | Age: 72
End: 2022-03-11

## 2022-03-11 NOTE — TELEPHONE ENCOUNTER
Spoke with pt and advised of normal labs per PCP. Pt states that she was calling regarding the Tramadol because at 1st the pharmacy told there that they did not have Rx. States that they called her back and they do have Rx.  No other questions or concerns

## 2022-03-12 ENCOUNTER — HOSPITAL ENCOUNTER (OUTPATIENT)
Dept: CT IMAGING | Age: 72
Discharge: HOME | End: 2022-03-12
Payer: MEDICARE

## 2022-03-12 DIAGNOSIS — G51.4 FACIAL TWITCHING: ICD-10-CM

## 2022-03-12 DIAGNOSIS — R29.810 FACIAL DROOP: ICD-10-CM

## 2022-03-12 PROCEDURE — 70450 CT HEAD/BRAIN W/O DYE: CPT

## 2022-03-15 RX ORDER — LEVOFLOXACIN 500 MG/1
500 TABLET, FILM COATED ORAL DAILY
Qty: 10 TABLET | Refills: 0 | Status: SHIPPED | OUTPATIENT
Start: 2022-03-15 | End: 2022-03-25

## 2022-03-16 DIAGNOSIS — H70.13 CHRONIC MASTOIDITIS OF BOTH SIDES: Primary | ICD-10-CM

## 2022-03-28 DIAGNOSIS — F51.01 PRIMARY INSOMNIA: ICD-10-CM

## 2022-03-28 RX ORDER — TRAZODONE HYDROCHLORIDE 100 MG/1
TABLET ORAL
Qty: 60 TABLET | Refills: 5 | Status: SHIPPED | OUTPATIENT
Start: 2022-03-28 | End: 2022-07-20 | Stop reason: SDUPTHER

## 2022-04-08 ENCOUNTER — TELEPHONE (OUTPATIENT)
Dept: FAMILY MEDICINE CLINIC | Age: 72
End: 2022-04-08

## 2022-04-08 RX ORDER — PREDNISONE 20 MG/1
20 TABLET ORAL DAILY
Qty: 5 TABLET | Refills: 0 | Status: SHIPPED | OUTPATIENT
Start: 2022-04-08 | End: 2022-04-13

## 2022-04-08 NOTE — TELEPHONE ENCOUNTER
Patient called requesting a refill on the prednisone she stated she is still having SOB from the cough please advise

## 2022-05-17 DIAGNOSIS — M54.50 CHRONIC MIDLINE LOW BACK PAIN WITHOUT SCIATICA: ICD-10-CM

## 2022-05-17 DIAGNOSIS — G89.29 CHRONIC MIDLINE LOW BACK PAIN WITHOUT SCIATICA: ICD-10-CM

## 2022-05-18 RX ORDER — TRAMADOL HYDROCHLORIDE 50 MG/1
TABLET ORAL
Qty: 90 TABLET | Refills: 2 | OUTPATIENT
Start: 2022-05-18

## 2022-05-20 ENCOUNTER — OFFICE VISIT (OUTPATIENT)
Dept: FAMILY MEDICINE CLINIC | Age: 72
End: 2022-05-20
Payer: MEDICARE

## 2022-05-20 VITALS
HEART RATE: 40 BPM | OXYGEN SATURATION: 97 % | SYSTOLIC BLOOD PRESSURE: 120 MMHG | WEIGHT: 130 LBS | DIASTOLIC BLOOD PRESSURE: 64 MMHG | RESPIRATION RATE: 15 BRPM | BODY MASS INDEX: 26.26 KG/M2 | TEMPERATURE: 97.3 F

## 2022-05-20 DIAGNOSIS — M54.50 CHRONIC MIDLINE LOW BACK PAIN WITHOUT SCIATICA: ICD-10-CM

## 2022-05-20 DIAGNOSIS — G89.29 CHRONIC MIDLINE LOW BACK PAIN WITHOUT SCIATICA: ICD-10-CM

## 2022-05-20 PROCEDURE — 1090F PRES/ABSN URINE INCON ASSESS: CPT | Performed by: FAMILY MEDICINE

## 2022-05-20 PROCEDURE — 99214 OFFICE O/P EST MOD 30 MIN: CPT | Performed by: FAMILY MEDICINE

## 2022-05-20 PROCEDURE — 3017F COLORECTAL CA SCREEN DOC REV: CPT | Performed by: FAMILY MEDICINE

## 2022-05-20 PROCEDURE — 1123F ACP DISCUSS/DSCN MKR DOCD: CPT | Performed by: FAMILY MEDICINE

## 2022-05-20 PROCEDURE — G8427 DOCREV CUR MEDS BY ELIG CLIN: HCPCS | Performed by: FAMILY MEDICINE

## 2022-05-20 PROCEDURE — 1036F TOBACCO NON-USER: CPT | Performed by: FAMILY MEDICINE

## 2022-05-20 PROCEDURE — G8417 CALC BMI ABV UP PARAM F/U: HCPCS | Performed by: FAMILY MEDICINE

## 2022-05-20 PROCEDURE — G8400 PT W/DXA NO RESULTS DOC: HCPCS | Performed by: FAMILY MEDICINE

## 2022-05-20 RX ORDER — TRAMADOL HYDROCHLORIDE 50 MG/1
50 TABLET ORAL EVERY 8 HOURS PRN
Qty: 90 TABLET | Refills: 0 | Status: SHIPPED | OUTPATIENT
Start: 2022-05-20 | End: 2022-06-19

## 2022-05-20 ASSESSMENT — PATIENT HEALTH QUESTIONNAIRE - PHQ9
SUM OF ALL RESPONSES TO PHQ QUESTIONS 1-9: 2
1. LITTLE INTEREST OR PLEASURE IN DOING THINGS: 1
SUM OF ALL RESPONSES TO PHQ QUESTIONS 1-9: 2
SUM OF ALL RESPONSES TO PHQ9 QUESTIONS 1 & 2: 2
2. FEELING DOWN, DEPRESSED OR HOPELESS: 1

## 2022-05-20 NOTE — PROGRESS NOTES
[facility]  [address]  [address] [place] [address]  Dept: [phone]  Loc: [phone]        ASSESSMENT/PLAN:    1. Chronic midline low back pain without sciatica  -     traMADol (ULTRAM) 50 MG tablet; Take 1 tablet by mouth every 8 hours as needed for Pain for up to 30 days. , Disp-90 tablet, R-0Normal  Patient with a history of chronic low back pain. Patient reports relief of her symptoms on tramadol. Patient encouraged to take medication as prescribed . PDMP reviewed    Return for keep scheduled Appointment. Patient's Name: Marcela Degroot   YOB: 1950   Age: 67 y.o. Date of service: 5/20/2022    Chief Complaint:   Chief Complaint   Patient presents with    Medication Refill     patient is here for medication refill        Patient ID: Marcela Degroot is a 67 y.o. female. Chief Complaint   Patient presents with    Medication Refill     patient is here for medication refill       HPI    Patient is a 29-year-old female with a history of low back pain who presents to the office for follow-up and medication refill. Patient reports that she gets significant relief of her low back pain on tramadol. Patient denies dizziness, lightheadedness or syncope. 12 point review of systems were negative other than in the HPI     Physical Exam  General: alert, awake, and oriented to time, place, person, and situation. Not in acute distress   Ear, nose, throat, Head: Normal external ears, pupils are equally round, EOMI, normocephalic/atraumatic  Heart: regular rate and rhythm,  no audible friction rub  Lungs: clear to auscultation bilaterally, no audible crackles, no audible wheezes, no audible rhonchi    Abdomen: normal bowel sounds, soft abdomen, non-tender, no palpable masses  Extremities: no edema, warm, no cyanosis, no clubbing.  Good capillary refill   Peripheral vascular: 2+ pulses symmetric (radial)  Neuro: Family: Not on file    Frequency of Social Gatherings with Friends and Family: Not on file    Attends Restoration Services: Not on file    Active Member of [address] [place] [address] or Organizations: Not on file    Attends Club or Organization Meetings: Not on file    Marital Status: Not on file   Intimate Partner Violence:     Fear of Current or Ex-Partner: Not on file    Emotionally Abused: Not on file    Physically Abused: Not on file    Sexually Abused: Not on file   Housing Stability:     Unable to Pay for Housing in the Last Year: Not on file    Number of [place] in the Last Year: Not on file    Unstable Housing in the Last Year: Not on file     Immunization History   Administered Date(s) Administered    Influenza, High Dose (Fluzone 65 yrs and older) 10/18/2015    Influenza, Nellene Linda, IM, PF (6 mo and older Fluzone, Flulaval, Fluarix, and 3 yrs and older Afluria) 09/28/2018, 09/10/2019, 10/01/2020    Influenza, Triv, 3 Years and older, IM (Vertis Mortimer (5 yrs and older) 10/01/2017    Pneumococcal Conjugate 13-valent (Mardel Berto) 02/09/2018    Tdap (Boostrix, Adacel) 02/03/2011     No Known Allergies  Family History   Problem Relation Age of Onset    Asthma Mother     Diabetes Father     Asthma Sister     Stroke Sister     Cancer Brother     Cancer Maternal Grandmother     Heart Disease Maternal Grandmother     Colon Cancer Maternal Grandfather          Current Outpatient Medications   Medication Sig Dispense Refill    traMADol (ULTRAM) 50 MG tablet Take 1 tablet by mouth every 8 hours as needed for Pain for up to 30 days.  90 tablet 0    traZODone (DESYREL) 100 MG tablet TAKE 1 OR 2 TABLETS BY MOUTH NIGHTLY AS NEEDED FOR SLEEP 60 tablet 5    umeclidinium-vilanterol (ANORO ELLIPTA) 62.5-25 MCG/INH AEPB inhaler Inhale 1 puff into the lungs daily 1 each 5    tiZANidine (ZANAFLEX) 4 MG tablet TAKE 1 TABLET BY MOUTH EVERY 8 HOURS AS NEEDED FOR MUSCLE SPASMS 40 tablet 2    ipratropium-albuterol (DUONEB) 0.5-2.5 (3) MG/3ML SOLN nebulizer solution Take 3 mLs by nebulization 4 times daily 360 mL 0    albuterol sulfate HFA (VENTOLIN HFA) 108 (90 Base) MCG/ACT inhaler Inhale 2 puffs into the lungs every 6 hours as needed for Wheezing 1 each 5    busPIRone (BUSPAR) 5 MG tablet Take 1 tablet by mouth 3 times daily (Patient not taking: Reported on 5/20/2022) 90 tablet 0    buPROPion (WELLBUTRIN XL) 300 MG extended release tablet TAKE ONE TABLET BY MOUTH IN THE MORNING (Patient not taking: Reported on 5/20/2022) 30 tablet 3    atorvastatin (LIPITOR) 10 MG tablet Take 1 tablet by mouth daily (Patient not taking: Reported on 3/10/2022) 90 tablet 1     No current facility-administered medications for this visit. Objective:  Vitals:  Vitals:    05/20/22 1242   BP: 120/64   Pulse: (!) 40   Resp: 15   Temp: 97.3 °F (36.3 °C)   SpO2: 97%      BP Readings from Last 4 Encounters:   05/20/22 120/64   03/10/22 120/78   11/18/21 118/62   10/11/21 112/66      Body mass index is 26.26 kg/m². All questions answered to patient's satisfaction. The patient was counseled regarding impressions, instructions for management and importance of compliance with treatment. Return for keep scheduled Appointment.     Candie Sethi MD

## 2022-05-31 ENCOUNTER — TELEPHONE (OUTPATIENT)
Dept: CARDIOLOGY CLINIC | Age: 72
End: 2022-05-31

## 2022-05-31 NOTE — TELEPHONE ENCOUNTER
Scheduled follow up with Dr Evans Cavanaugh per text from Dr Evans Cavanaugh.  Patient needs seen for CP and possible stress test

## 2022-06-08 ENCOUNTER — OFFICE VISIT (OUTPATIENT)
Dept: CARDIOLOGY CLINIC | Age: 72
End: 2022-06-08
Payer: MEDICARE

## 2022-06-08 VITALS
BODY MASS INDEX: 25.52 KG/M2 | HEIGHT: 60 IN | SYSTOLIC BLOOD PRESSURE: 112 MMHG | HEART RATE: 66 BPM | DIASTOLIC BLOOD PRESSURE: 60 MMHG | WEIGHT: 130 LBS

## 2022-06-08 DIAGNOSIS — R07.9 CHEST PAIN, UNSPECIFIED TYPE: Primary | ICD-10-CM

## 2022-06-08 PROCEDURE — 1036F TOBACCO NON-USER: CPT | Performed by: INTERNAL MEDICINE

## 2022-06-08 PROCEDURE — 99213 OFFICE O/P EST LOW 20 MIN: CPT | Performed by: INTERNAL MEDICINE

## 2022-06-08 PROCEDURE — G8417 CALC BMI ABV UP PARAM F/U: HCPCS | Performed by: INTERNAL MEDICINE

## 2022-06-08 PROCEDURE — 93000 ELECTROCARDIOGRAM COMPLETE: CPT | Performed by: INTERNAL MEDICINE

## 2022-06-08 PROCEDURE — 1090F PRES/ABSN URINE INCON ASSESS: CPT | Performed by: INTERNAL MEDICINE

## 2022-06-08 PROCEDURE — G8400 PT W/DXA NO RESULTS DOC: HCPCS | Performed by: INTERNAL MEDICINE

## 2022-06-08 PROCEDURE — 1123F ACP DISCUSS/DSCN MKR DOCD: CPT | Performed by: INTERNAL MEDICINE

## 2022-06-08 PROCEDURE — G8427 DOCREV CUR MEDS BY ELIG CLIN: HCPCS | Performed by: INTERNAL MEDICINE

## 2022-06-08 PROCEDURE — 3017F COLORECTAL CA SCREEN DOC REV: CPT | Performed by: INTERNAL MEDICINE

## 2022-06-08 NOTE — PROGRESS NOTES
Marylen Feast, MD        OFFICE  FOLLOWUP NOTE    Chief complaints: patient is here for management of chest tightness, GERD, PALPITATONS    Subjective: + chest pain, no shortness of breath, no dizziness, no palpitations    HPI Oleg Melgar is a 67 y. o.year old who  has a past medical history of Aneurysm of abdominal aorta (HCC), Asthma, Back ache, COPD (chronic obstructive pulmonary disease) ([facility] [address] H/O echocardiogram, History of nuclear stress test, and Needs flu shot. and presents for management of chest tightness, GERD, PALPITATONS  SHE FEELS like indigestion and feels chest tightness and radiated to arm, and also feels some neck pain and arm pain. Current Outpatient Medications   Medication Sig Dispense Refill    traMADol (ULTRAM) 50 MG tablet Take 1 tablet by mouth every 8 hours as needed for Pain for up to 30 days. 90 tablet 0    traZODone (DESYREL) 100 MG tablet TAKE 1 OR 2 TABLETS BY MOUTH NIGHTLY AS NEEDED FOR SLEEP 60 tablet 5    umeclidinium-vilanterol (ANORO ELLIPTA) 62.5-25 MCG/INH AEPB inhaler Inhale 1 puff into the lungs daily 1 each 5    tiZANidine (ZANAFLEX) 4 MG tablet TAKE 1 TABLET BY MOUTH EVERY 8 HOURS AS NEEDED FOR MUSCLE SPASMS 40 tablet 2    ipratropium-albuterol (DUONEB) 0.5-2.5 (3) MG/3ML SOLN nebulizer solution Take 3 mLs by nebulization 4 times daily 360 mL 0    albuterol sulfate HFA (VENTOLIN HFA) 108 (90 Base) MCG/ACT inhaler Inhale 2 puffs into the lungs every 6 hours as needed for Wheezing 1 each 5    busPIRone (BUSPAR) 5 MG tablet Take 1 tablet by mouth 3 times daily 90 tablet 0    buPROPion (WELLBUTRIN XL) 300 MG extended release tablet TAKE ONE TABLET BY MOUTH IN THE MORNING 30 tablet 3    atorvastatin (LIPITOR) 10 MG tablet Take 1 tablet by mouth daily 90 tablet 1     No current facility-administered medications for this visit. Allergies: Patient has no known allergies.   Past Medical History:   Diagnosis Date    [facility]  Asthma     Back ache     reason for taking tramadol    COPD (chronic obstructive pulmonary disease) ([facility] [address]     H/O echocardiogram 2020    EF 50-55%. Sclerotic, but non-stenotic aortic valve. Moderate AR. Mild TR, MR.     History of nuclear stress test 2020    EF 75%. This is a normal study.  Needs flu shot 2018     Past Surgical History:   Procedure Laterality Date    APPENDECTOMY      HYSTERECTOMY (CERVIX STATUS UNKNOWN)      MIDDLE EAR SURGERY       Family History   Problem Relation Age of Onset    Asthma Mother     Diabetes Father     Asthma Sister     Stroke Sister     Cancer Brother     Cancer Maternal Grandmother     Heart Disease Maternal Grandmother     Colon Cancer Maternal Grandfather      Social History     Tobacco Use    Smoking status: Former Smoker     Packs/day: 1.00     Years: 30.00     Pack years: 30.00     Quit date: 2015     Years since quittin.4    Smokeless tobacco: Never Used   Substance Use Topics    Alcohol use: No      [unfilled]  Review of Systems:   · Constitutional: No Fever or Weight Loss   · Eyes: No Decreased Vision  · ENT: No Headaches, Hearing Loss or Vertigo  · Cardiovascular: + chest pain, dyspnea on exertion, palpitations or loss of consciousness  · Respiratory: No cough or wheezing    · Gastrointestinal: No abdominal pain, appetite loss, blood in stools, constipation, diarrhea or heartburn  · Genitourinary: No dysuria, trouble voiding, or hematuria  · Musculoskeletal:  No gait disturbance, weakness or joint complaints  · Integumentary: No rash or pruritis  · Neurological: No TIA or stroke symptoms  · Psychiatric: No anxiety or depression  · Endocrine: No malaise, fatigue or temperature intolerance  · Hematologic/Lymphatic: No bleeding problems, blood clots or swollen lymph nodes  · Allergic/Immunologic: No nasal congestion or hives  All systems negative except as marked.    Objective:  /60   Pulse 66   Ht 5' (1.524 m)   [employer] 130 lb (59 kg)   BMI 25.39 kg/m²   Wt Readings from Last 3 Encounters:   06/08/22 130 lb (59 kg)   05/31/22 130 lb (59 kg)   05/20/22 130 lb (59 kg)     Body mass index is 25.39 kg/m². GENERAL - Alert, oriented, pleasant, in no apparent distress,normal grooming  HEENT - pupils are intact, cornea intact, conjunctive normal color, ears are normal in exam,  Neck - Supple. No jugular venous distention noted. No carotid bruits, no apical -carotid delay  Respiratory:  Normal breath sounds, No respiratory distress, No wheezing, No chest tenderness. ,no use of accessory muscles, diaphragm movement is normal  Cardiovascular: (PMI) apex non displaced,no lifts no thrills, no s3,no s4, Normal heart rate, Normal rhythm, No murmurs, No rubs, No gallops. Carotid arteries pulse and amplitude are normal no bruit, no abdominal bruit noted ( normal abdominal aorta ausculation),   Extremities - No cyanosis, clubbing, or significant edema, no varicose veins    Abdomen - No masses, tenderness, or organomegaly, no hepato-splenomegally, no bruits  Musculoskeletal - No significant edema, no kyphosis or scoliosis, no deformity in any extremity noted, muscle strength and tone are normal  Skin: no ulcer,no scar,no stasis dermatitis   Neurologic - alert oriented times 3,Cranial nerves II through XII are grossly intact. There were no gross focal neurologic abnormalities. Psychiatric: normal mood and affect    Lab Results   Component Value Date    TROPONINI <0.01 05/07/2019     BNP:  No results found for: BNP  PT/INR:  No results found for: PTINR  No results found for: LABA1C  Lab Results   Component Value Date    CHOL 234 (H) 03/06/2018    TRIG 79 03/06/2018    HDL 62 (H) 11/12/2020    LDLCALC 155 (H) 11/12/2020     Lab Results   Component Value Date    ALT 6 (L) 03/10/2022    AST 9 (L) 03/10/2022     TSH:    Lab Results   Component Value Date    TSH 3.76 11/12/2020     Ekg: nsr    Impression:  Haydee Morgan is a 67 y. o.year old who  has a past medical history of Aneurysm of abdominal aorta (HCC), Asthma, Back ache, COPD (chronic obstructive pulmonary disease) ([facility] [address] H/O echocardiogram, History of nuclear stress test, and Needs flu shot. and presents with     Plan:  1. Chest pain:stress test ordered  2. Palpitations better now, she had 1 week event monitor showed some PVCs  3. COPD: not smoking anymore  4. Health maintenance: exerise and diet  All labs, medications and tests reviewed, continue all other medications of all above medical condition listed as is.     [unfilled]

## 2022-07-19 ENCOUNTER — TELEPHONE (OUTPATIENT)
Dept: FAMILY MEDICINE CLINIC | Age: 72
End: 2022-07-19

## 2022-07-19 NOTE — TELEPHONE ENCOUNTER
Pt is overdue for her mammo. Called pt and left vm to call the office. Need to advise pt that she is overdue for her mammo and she can call [phone] to schedule appt. Pt has appt with PCP on 7/25/22.

## 2022-07-20 DIAGNOSIS — F51.01 PRIMARY INSOMNIA: ICD-10-CM

## 2022-07-20 RX ORDER — TRAZODONE HYDROCHLORIDE 100 MG/1
TABLET ORAL
Qty: 60 TABLET | Refills: 5 | Status: SHIPPED | OUTPATIENT
Start: 2022-07-20

## 2022-07-28 RX ORDER — PREDNISONE 20 MG/1
TABLET ORAL
Qty: 5 TABLET | Refills: 1 | OUTPATIENT
Start: 2022-07-28

## 2022-08-18 ENCOUNTER — OFFICE VISIT (OUTPATIENT)
Dept: FAMILY MEDICINE CLINIC | Age: 72
End: 2022-08-18
Payer: MEDICARE

## 2022-08-18 VITALS
BODY MASS INDEX: 25.23 KG/M2 | DIASTOLIC BLOOD PRESSURE: 60 MMHG | SYSTOLIC BLOOD PRESSURE: 108 MMHG | TEMPERATURE: 97.4 F | OXYGEN SATURATION: 96 % | WEIGHT: 129.2 LBS | HEART RATE: 78 BPM | RESPIRATION RATE: 18 BRPM

## 2022-08-18 DIAGNOSIS — G89.29 CHRONIC MIDLINE LOW BACK PAIN WITHOUT SCIATICA: Primary | ICD-10-CM

## 2022-08-18 DIAGNOSIS — I71.40 ABDOMINAL AORTIC ANEURYSM (AAA) WITHOUT RUPTURE: ICD-10-CM

## 2022-08-18 DIAGNOSIS — G25.81 RESTLESS LEG: ICD-10-CM

## 2022-08-18 DIAGNOSIS — R10.9 FLANK PAIN: ICD-10-CM

## 2022-08-18 DIAGNOSIS — Z78.0 POST-MENOPAUSAL: ICD-10-CM

## 2022-08-18 DIAGNOSIS — M54.50 CHRONIC MIDLINE LOW BACK PAIN WITHOUT SCIATICA: Primary | ICD-10-CM

## 2022-08-18 DIAGNOSIS — F32.1 CURRENT MODERATE EPISODE OF MAJOR DEPRESSIVE DISORDER WITHOUT PRIOR EPISODE (HCC): ICD-10-CM

## 2022-08-18 DIAGNOSIS — G25.81 RESTLESS LEGS: ICD-10-CM

## 2022-08-18 DIAGNOSIS — Z12.11 COLON CANCER SCREENING: ICD-10-CM

## 2022-08-18 DIAGNOSIS — R53.83 OTHER FATIGUE: ICD-10-CM

## 2022-08-18 DIAGNOSIS — Z12.31 ENCOUNTER FOR SCREENING MAMMOGRAM FOR BREAST CANCER: ICD-10-CM

## 2022-08-18 DIAGNOSIS — Z79.899 MEDICATION MANAGEMENT: ICD-10-CM

## 2022-08-18 LAB
ALCOHOL URINE: NORMAL
AMPHETAMINE SCREEN, URINE: NORMAL
BARBITURATE SCREEN, URINE: NORMAL
BENZODIAZEPINE SCREEN, URINE: NORMAL
BILIRUBIN, POC: ABNORMAL
BLOOD URINE, POC: ABNORMAL
BUPRENORPHINE URINE: NORMAL
CLARITY, POC: CLEAR
COCAINE METABOLITE SCREEN URINE: NORMAL
COLOR, POC: YELLOW
FENTANYL SCREEN, URINE: NORMAL
GABAPENTIN SCREEN, URINE: NORMAL
GLUCOSE URINE, POC: ABNORMAL
KETONES, POC: ABNORMAL
LEUKOCYTE EST, POC: ABNORMAL
MDMA URINE: NORMAL
METHADONE SCREEN, URINE: NORMAL
METHAMPHETAMINE, URINE: NORMAL
NITRITE, POC: POSITIVE
OPIATE SCREEN URINE: NORMAL
OXYCODONE SCREEN URINE: NORMAL
PH, POC: 7
PHENCYCLIDINE SCREEN URINE: NORMAL
PROPOXYPHENE SCREEN, URINE: NORMAL
PROTEIN, POC: ABNORMAL
SPECIFIC GRAVITY, POC: 1.02
SYNTHETIC CANNABINOIDS(K2) SCREEN, URINE: NORMAL
THC SCREEN, URINE: NORMAL
TRAMADOL SCREEN URINE: NORMAL
TRICYCLIC ANTIDEPRESSANTS, UR: NORMAL
UROBILINOGEN, POC: ABNORMAL

## 2022-08-18 PROCEDURE — 1090F PRES/ABSN URINE INCON ASSESS: CPT | Performed by: PHYSICIAN ASSISTANT

## 2022-08-18 PROCEDURE — G8427 DOCREV CUR MEDS BY ELIG CLIN: HCPCS | Performed by: PHYSICIAN ASSISTANT

## 2022-08-18 PROCEDURE — G8400 PT W/DXA NO RESULTS DOC: HCPCS | Performed by: PHYSICIAN ASSISTANT

## 2022-08-18 PROCEDURE — 3017F COLORECTAL CA SCREEN DOC REV: CPT | Performed by: PHYSICIAN ASSISTANT

## 2022-08-18 PROCEDURE — 81002 URINALYSIS NONAUTO W/O SCOPE: CPT | Performed by: PHYSICIAN ASSISTANT

## 2022-08-18 PROCEDURE — 1123F ACP DISCUSS/DSCN MKR DOCD: CPT | Performed by: PHYSICIAN ASSISTANT

## 2022-08-18 PROCEDURE — G8417 CALC BMI ABV UP PARAM F/U: HCPCS | Performed by: PHYSICIAN ASSISTANT

## 2022-08-18 PROCEDURE — 99214 OFFICE O/P EST MOD 30 MIN: CPT | Performed by: PHYSICIAN ASSISTANT

## 2022-08-18 PROCEDURE — 80305 DRUG TEST PRSMV DIR OPT OBS: CPT | Performed by: PHYSICIAN ASSISTANT

## 2022-08-18 PROCEDURE — 1036F TOBACCO NON-USER: CPT | Performed by: PHYSICIAN ASSISTANT

## 2022-08-18 RX ORDER — GABAPENTIN 100 MG/1
100 CAPSULE ORAL 2 TIMES DAILY
Qty: 60 CAPSULE | Refills: 2 | Status: SHIPPED | OUTPATIENT
Start: 2022-08-18 | End: 2023-02-14

## 2022-08-18 RX ORDER — TRAMADOL HYDROCHLORIDE 50 MG/1
50 TABLET ORAL EVERY 6 HOURS PRN
COMMUNITY
End: 2022-08-18 | Stop reason: SDUPTHER

## 2022-08-18 RX ORDER — PREDNISONE 20 MG/1
20 TABLET ORAL DAILY
Qty: 5 TABLET | Refills: 0 | Status: SHIPPED | OUTPATIENT
Start: 2022-08-18 | End: 2022-08-23

## 2022-08-18 RX ORDER — ARIPIPRAZOLE 5 MG/1
5 TABLET ORAL DAILY
Qty: 30 TABLET | Refills: 3 | Status: SHIPPED | OUTPATIENT
Start: 2022-08-18

## 2022-08-18 RX ORDER — TRAMADOL HYDROCHLORIDE 50 MG/1
50 TABLET ORAL EVERY 6 HOURS PRN
Qty: 90 TABLET | Refills: 2 | Status: SHIPPED | OUTPATIENT
Start: 2022-08-18 | End: 2022-11-16

## 2022-08-18 RX ORDER — BUSPIRONE HYDROCHLORIDE 7.5 MG/1
7.5 TABLET ORAL 3 TIMES DAILY
Qty: 90 TABLET | Refills: 5 | Status: SHIPPED | OUTPATIENT
Start: 2022-08-18 | End: 2022-09-17

## 2022-08-18 ASSESSMENT — PATIENT HEALTH QUESTIONNAIRE - PHQ9
1. LITTLE INTEREST OR PLEASURE IN DOING THINGS: 2
SUM OF ALL RESPONSES TO PHQ QUESTIONS 1-9: 14
7. TROUBLE CONCENTRATING ON THINGS, SUCH AS READING THE NEWSPAPER OR WATCHING TELEVISION: 1
6. FEELING BAD ABOUT YOURSELF - OR THAT YOU ARE A FAILURE OR HAVE LET YOURSELF OR YOUR FAMILY DOWN: 0
SUM OF ALL RESPONSES TO PHQ QUESTIONS 1-9: 14
8. MOVING OR SPEAKING SO SLOWLY THAT OTHER PEOPLE COULD HAVE NOTICED. OR THE OPPOSITE, BEING SO FIGETY OR RESTLESS THAT YOU HAVE BEEN MOVING AROUND A LOT MORE THAN USUAL: 0
5. POOR APPETITE OR OVEREATING: 2
3. TROUBLE FALLING OR STAYING ASLEEP: 3
2. FEELING DOWN, DEPRESSED OR HOPELESS: 3
4. FEELING TIRED OR HAVING LITTLE ENERGY: 3
SUM OF ALL RESPONSES TO PHQ9 QUESTIONS 1 & 2: 5
10. IF YOU CHECKED OFF ANY PROBLEMS, HOW DIFFICULT HAVE THESE PROBLEMS MADE IT FOR YOU TO DO YOUR WORK, TAKE CARE OF THINGS AT HOME, OR GET ALONG WITH OTHER PEOPLE: 1
SUM OF ALL RESPONSES TO PHQ QUESTIONS 1-9: 14
SUM OF ALL RESPONSES TO PHQ QUESTIONS 1-9: 14
9. THOUGHTS THAT YOU WOULD BE BETTER OFF DEAD, OR OF HURTING YOURSELF: 0

## 2022-08-18 NOTE — PROGRESS NOTES
Sylvester Paris  1950  67 y.o.  female    SUBJECTIVE:    Chief Complaint   Patient presents with    Other     Was sent from Dr. Doristine Heimlich ,  He thinks patient Aneurysm is larger . Patient needs refills on Tramadol         HPI    Chronic back pain-pt reports chronic low back pain/OA herb hands. Pain severe at times and causes difficulty with ADLs and leading to poorer quality of life due to pain. Pt is raising great grandchildren and pain control allows her to remain functional and able to provide care to great grandchildren. States she recently did establish for medical marijuana for better pain control and filled it but is too afraid to try it now. Discussed OARRs report with pt today that shows she has routinely been filling medication but pt states she \"just puts it up in a drawer\". States she can take drug screen today to prove she is not using the medical marijuana. Has noticed some mild increase in pain in left lower back area radiating to flank. AAA-follows with Dr. Kedar Zamora for routine monitoring. Recent US in May with stable size. However, pt recently had xray of lumbar spine at chiropractor and he advised pt aneurysm appeared to be larger. She has appt 8/22/2022 with Dr. Kedar Zamora for f/u. Depression-chronic, had been improving but recently has noted depression and anxiety are worse than normal. States \"I just have so much to deal with\". Currently taking buspar 5 mg tid but pt feels this is not helping at this time. Pt states she feels exhausted all the time and not sure if this is due to her depression. Restless legs-states herb legs jump and kick during night, especially when first trying to fall asleep. States this has been constant issue for several years but gradually worsening.      PHQ Scores 8/18/2022 5/20/2022 3/10/2022 11/18/2021 10/11/2021 7/8/2021/8/2021 4/6/2021   PHQ2 Score 5 2 0 5 2 2 2   PHQ9 Score 14 2 0 11 2 2 2     Interpretation of Total Score Depression Severity: 1-4 = Minimal depression, 5-9 = Mild depression, 10-14 = Moderate depression, 15-19 = Moderately severe depression, 20-27 = Severe depression     Current Outpatient Medications on File Prior to Visit   Medication Sig Dispense Refill    traZODone (DESYREL) 100 MG tablet 1-2 tabs at bedtime 60 tablet 5    umeclidinium-vilanterol (ANORO ELLIPTA) 62.5-25 MCG/INH AEPB inhaler Inhale 1 puff into the lungs daily 1 each 5    tiZANidine (ZANAFLEX) 4 MG tablet TAKE 1 TABLET BY MOUTH EVERY 8 HOURS AS NEEDED FOR MUSCLE SPASMS 40 tablet 2    ipratropium-albuterol (DUONEB) 0.5-2.5 (3) MG/3ML SOLN nebulizer solution Take 3 mLs by nebulization 4 times daily 360 mL 0    albuterol sulfate HFA (VENTOLIN HFA) 108 (90 Base) MCG/ACT inhaler Inhale 2 puffs into the lungs every 6 hours as needed for Wheezing 1 each 5    buPROPion (WELLBUTRIN XL) 300 MG extended release tablet TAKE ONE TABLET BY MOUTH IN THE MORNING 30 tablet 3    atorvastatin (LIPITOR) 10 MG tablet Take 1 tablet by mouth daily 90 tablet 1     No current facility-administered medications on file prior to visit. No Known Allergies    Past Medical History:   Diagnosis Date    Aneurysm of abdominal aorta ([facility])     Asthma     Back ache     reason for taking tramadol    COPD (chronic obstructive pulmonary disease) ([facility])     COPD exacerbation ([facility] [address] 2/26/2018    H/O echocardiogram 02/18/2020    EF 50-55%. Sclerotic, but non-stenotic aortic valve. Moderate AR. Mild TR, MR. History of nuclear stress test 02/18/2020    EF 75%. This is a normal study.     Needs flu shot 9/28/2018       Past Surgical History:   Procedure Laterality Date    APPENDECTOMY      HYSTERECTOMY (CERVIX STATUS UNKNOWN)      MIDDLE EAR SURGERY         Social History     Socioeconomic History    Marital status: Single     Spouse name: None    Number of children: None    Years of education: None    Highest education level: None   Tobacco Use    Smoking status: Former     Packs/day: 1.00     Years: 30.00     Pack years: 30.00 Types: Cigarettes     Quit date: 2015     Years since quittin.6    Smokeless tobacco: Never   Vaping Use    Vaping Use: Never used   Substance and Sexual Activity    Alcohol use: No    Drug use: No    Sexual activity: Never       Review of Systems   Constitutional:  Positive for fatigue. Negative for chills and fever. HENT: Negative. Respiratory:  Negative for cough and shortness of breath. Cardiovascular:  Negative for chest pain and leg swelling. Gastrointestinal:  Negative for abdominal pain, diarrhea, nausea and vomiting. Endocrine: Negative. Genitourinary:  Positive for flank pain. Negative for dysuria and frequency. Musculoskeletal:  Positive for arthralgias and back pain. Skin: Negative. Neurological: Negative. Psychiatric/Behavioral:  Positive for dysphoric mood and sleep disturbance. Negative for self-injury and suicidal ideas. The patient is nervous/anxious. OBJECTIVE:    /60 (Site: Left Upper Arm, Position: Sitting, Cuff Size: Medium Adult)   Pulse 78   Temp 97.4 °F (36.3 °C) (Infrared)   Resp 18   Wt 129 lb 3.2 oz (58.6 kg)   SpO2 96%   BMI 25.23 kg/m²     Physical Exam  Vitals reviewed. Constitutional:       General: She is not in acute distress. Appearance: Normal appearance. HENT:      Head: Normocephalic. Right Ear: External ear normal.      Left Ear: External ear normal.   Eyes:      Extraocular Movements: Extraocular movements intact. Cardiovascular:      Rate and Rhythm: Normal rate and regular rhythm. Heart sounds: Normal heart sounds. Pulmonary:      Effort: Pulmonary effort is normal.      Breath sounds: Normal breath sounds. Abdominal:      General: Bowel sounds are normal.      Palpations: Abdomen is soft. Tenderness: There is no abdominal tenderness. There is no right CVA tenderness or left CVA tenderness. Musculoskeletal:         General: Tenderness present. Cervical back: Normal range of motion. Skin:     General: Skin is warm and dry. Neurological:      Mental Status: She is alert and oriented to person, place, and time. Psychiatric:         Attention and Perception: Attention normal.         Mood and Affect: Mood is anxious and depressed. Speech: Speech normal.         Behavior: Behavior normal. Behavior is cooperative. Thought Content: Thought content normal.       uYejustine Becerramiguel angel:    Problem List          Circulatory    Abdominal aortic aneurysm (AAA) without rupture (HCC)     Keep appt next week with Dr. Prem Aguirre without fail, reviewed sxs for which to present to ER/call 911, pt voices understanding             Other    Restless legs     Will try low dose gabapentin, Risks/benefits/SE reviewed, pt voices understanding  Recheck in 3 months, sooner prn          Post-menopausal    Relevant Orders    DEXA Bone Density Axial Skeleton    Other fatigue     Labs now          Relevant Orders    CBC with Auto Differential (Completed)    Medication management     Drug screen now, pt advised to stop filling medical marijuana and she is not using it.  Risks of having medication in home reviewed          Relevant Orders    POCT Rapid Drug Screen (Completed)    Encounter for screening mammogram for breast cancer    Relevant Orders    JS Digital Screen Bilateral    Current moderate episode of major depressive disorder without prior episode (HCC)     Add abilify, Risks/benefits/SE reviewed, pt voices understanding  Recheck in 4-6 weeks          Relevant Medications    buPROPion (WELLBUTRIN XL) 300 MG extended release tablet    traZODone (DESYREL) 100 MG tablet    gabapentin (NEURONTIN) 100 MG capsule    busPIRone (BUSPAR) 7.5 MG tablet    ARIPiprazole (ABILIFY) 5 MG tablet    Chronic midline low back pain without sciatica - Primary     OARRs reviewed today, refill medication, Risks/benefits/SE reviewed, pt voices understanding  Recheck in 3 motnhs          Relevant Medications    tiZANidine (ZANAFLEX) 4 MG

## 2022-08-19 PROBLEM — J44.1 COPD EXACERBATION (HCC): Status: RESOLVED | Noted: 2018-02-26 | Resolved: 2022-08-19

## 2022-08-19 PROBLEM — G25.81 RESTLESS LEGS: Status: ACTIVE | Noted: 2022-08-19

## 2022-08-19 LAB
BASOPHILS ABSOLUTE: 0 K/UL (ref 0–0.2)
BASOPHILS RELATIVE PERCENT: 0.6 %
EOSINOPHILS ABSOLUTE: 0.3 K/UL (ref 0–0.6)
EOSINOPHILS RELATIVE PERCENT: 3.2 %
HCT VFR BLD CALC: 41.3 % (ref 36–48)
HEMOGLOBIN: 13.8 G/DL (ref 12–16)
LYMPHOCYTES ABSOLUTE: 2.2 K/UL (ref 1–5.1)
LYMPHOCYTES RELATIVE PERCENT: 28.1 %
MCH RBC QN AUTO: 32.5 PG (ref 26–34)
MCHC RBC AUTO-ENTMCNC: 33.5 G/DL (ref 31–36)
MCV RBC AUTO: 97.1 FL (ref 80–100)
MONOCYTES ABSOLUTE: 0.6 K/UL (ref 0–1.3)
MONOCYTES RELATIVE PERCENT: 7.6 %
NEUTROPHILS ABSOLUTE: 4.8 K/UL (ref 1.7–7.7)
NEUTROPHILS RELATIVE PERCENT: 60.5 %
PDW BLD-RTO: 14.4 % (ref 12.4–15.4)
PLATELET # BLD: 270 K/UL (ref 135–450)
PMV BLD AUTO: 8.1 FL (ref 5–10.5)
RBC # BLD: 4.26 M/UL (ref 4–5.2)
WBC # BLD: 8 K/UL (ref 4–11)

## 2022-08-19 ASSESSMENT — ENCOUNTER SYMPTOMS
SHORTNESS OF BREATH: 0
ABDOMINAL PAIN: 0
BACK PAIN: 1
COUGH: 0
DIARRHEA: 0
VOMITING: 0
NAUSEA: 0

## 2022-08-19 NOTE — ASSESSMENT & PLAN NOTE
OARRs reviewed today, refill medication, Risks/benefits/SE reviewed, pt voices understanding  Recheck in 3 [facility]

## 2022-08-19 NOTE — ASSESSMENT & PLAN NOTE
Will try low dose gabapentin, Risks/benefits/SE reviewed, pt voices understanding  Recheck in 3 months, sooner prn

## 2022-08-19 NOTE — ASSESSMENT & PLAN NOTE
Drug screen now, pt advised to stop filling medical marijuana and she is not using it.  Risks of having medication in home reviewed

## 2022-08-19 NOTE — ASSESSMENT & PLAN NOTE
Keep appt next week with Dr. Marco Mckeon without fail, reviewed sxs for which to present to ER/call 911, pt voices understanding

## 2022-08-21 LAB
ORGANISM: ABNORMAL
URINE CULTURE, ROUTINE: ABNORMAL

## 2022-08-22 DIAGNOSIS — Z01.810 PREOP CARDIOVASCULAR EXAM: Primary | ICD-10-CM

## 2022-08-22 RX ORDER — NITROFURANTOIN 25; 75 MG/1; MG/1
100 CAPSULE ORAL 2 TIMES DAILY
Qty: 14 CAPSULE | Refills: 0 | Status: SHIPPED | OUTPATIENT
Start: 2022-08-22 | End: 2022-08-29

## 2022-08-23 ENCOUNTER — TELEPHONE (OUTPATIENT)
Dept: SURGERY | Age: 72
End: 2022-08-23

## 2022-08-24 ENCOUNTER — TELEPHONE (OUTPATIENT)
Dept: CARDIOLOGY CLINIC | Age: 72
End: 2022-08-24

## 2022-08-24 NOTE — TELEPHONE ENCOUNTER
Per Dr Loly Rubalcava please call patient and scheduled cardiolite stress test ASAP! [place] She is to hold her beta blockers.

## 2022-08-26 ENCOUNTER — TELEPHONE (OUTPATIENT)
Dept: CARDIOLOGY CLINIC | Age: 72
End: 2022-08-26

## 2022-08-29 ENCOUNTER — TELEPHONE (OUTPATIENT)
Dept: FAMILY MEDICINE CLINIC | Age: 72
End: 2022-08-29

## 2022-08-29 NOTE — TELEPHONE ENCOUNTER
Client is asking if PCP is able to write a script for her handicap plaque . Clients tag  in July and was not aware until a ticket was given for $250.00 due to  plaque. Client is asking if a licence request is possible instead of a plaque to extend period of time between renewals. Client aware PCP is out of the office until 22.

## 2022-08-30 ENCOUNTER — PROCEDURE VISIT (OUTPATIENT)
Dept: CARDIOLOGY CLINIC | Age: 72
End: 2022-08-30
Payer: MEDICARE

## 2022-08-30 DIAGNOSIS — Z01.810 PREOP CARDIOVASCULAR EXAM: ICD-10-CM

## 2022-08-30 LAB
LV EF: 67 %
LVEF MODALITY: NORMAL

## 2022-08-30 PROCEDURE — 93017 CV STRESS TEST TRACING ONLY: CPT | Performed by: INTERNAL MEDICINE

## 2022-08-30 PROCEDURE — 93018 CV STRESS TEST I&R ONLY: CPT | Performed by: INTERNAL MEDICINE

## 2022-08-30 PROCEDURE — A9500 TC99M SESTAMIBI: HCPCS | Performed by: INTERNAL MEDICINE

## 2022-08-30 PROCEDURE — 78452 HT MUSCLE IMAGE SPECT MULT: CPT | Performed by: INTERNAL MEDICINE

## 2022-08-30 PROCEDURE — 93016 CV STRESS TEST SUPVJ ONLY: CPT | Performed by: INTERNAL MEDICINE

## 2022-08-30 NOTE — TELEPHONE ENCOUNTER
Called patient and updated that the letter for handicap plaque is done. Patient states she will need a copy printed for her to take to the 2025 [facility].   Printed copy and placed at registration desk to be picked up

## 2022-09-02 ENCOUNTER — TELEPHONE (OUTPATIENT)
Dept: CARDIOLOGY CLINIC | Age: 72
End: 2022-09-02

## 2022-09-06 ENCOUNTER — COMMUNITY OUTREACH (OUTPATIENT)
Dept: FAMILY MEDICINE CLINIC | Age: 72
End: 2022-09-06

## 2022-09-06 NOTE — PROGRESS NOTES
Patient's HM shows they are overdue for Mammogram and Colorectal Screening. [employer] and  files searched. No results to attach to order nor HM updated.

## 2022-09-17 PROBLEM — Z12.31 ENCOUNTER FOR SCREENING MAMMOGRAM FOR BREAST CANCER: Status: RESOLVED | Noted: 2021-10-11 | Resolved: 2022-09-17

## 2022-10-13 DIAGNOSIS — G89.29 CHRONIC MIDLINE LOW BACK PAIN WITHOUT SCIATICA: ICD-10-CM

## 2022-10-13 DIAGNOSIS — M54.50 CHRONIC MIDLINE LOW BACK PAIN WITHOUT SCIATICA: ICD-10-CM

## 2022-10-13 DIAGNOSIS — G25.81 RESTLESS LEG: ICD-10-CM

## 2022-10-13 RX ORDER — GABAPENTIN 100 MG/1
CAPSULE ORAL
Qty: 60 CAPSULE | Refills: 2 | OUTPATIENT
Start: 2022-10-13

## 2022-10-14 DIAGNOSIS — M54.50 CHRONIC MIDLINE LOW BACK PAIN WITHOUT SCIATICA: ICD-10-CM

## 2022-10-14 DIAGNOSIS — G89.29 CHRONIC MIDLINE LOW BACK PAIN WITHOUT SCIATICA: ICD-10-CM

## 2022-10-14 RX ORDER — TRAMADOL HYDROCHLORIDE 50 MG/1
TABLET ORAL
Qty: 90 TABLET | Refills: 2 | OUTPATIENT
Start: 2022-10-14

## 2022-10-18 ENCOUNTER — NURSE ONLY (OUTPATIENT)
Dept: FAMILY MEDICINE CLINIC | Age: 72
End: 2022-10-18
Payer: MEDICARE

## 2022-10-18 ENCOUNTER — TELEPHONE (OUTPATIENT)
Dept: FAMILY MEDICINE CLINIC | Age: 72
End: 2022-10-18

## 2022-10-18 VITALS — TEMPERATURE: 97.1 F

## 2022-10-18 DIAGNOSIS — R30.0 DYSURIA: Primary | ICD-10-CM

## 2022-10-18 LAB
BILIRUBIN, POC: NEGATIVE
BLOOD URINE, POC: NEGATIVE
CLARITY, POC: CLEAR
COLOR, POC: NORMAL
GLUCOSE URINE, POC: NEGATIVE
KETONES, POC: NEGATIVE
LEUKOCYTE EST, POC: NEGATIVE
NITRITE, POC: NEGATIVE
PH, POC: 7
PROTEIN, POC: NEGATIVE
SPECIFIC GRAVITY, POC: 1.02
UROBILINOGEN, POC: NORMAL

## 2022-10-18 PROCEDURE — 81002 URINALYSIS NONAUTO W/O SCOPE: CPT | Performed by: PHYSICIAN ASSISTANT

## 2022-10-18 NOTE — TELEPHONE ENCOUNTER
Called patient and explained that her urine results ran in office was good. Updated that provider  ordered urine culture. Explained that once results came back would call her with results.

## 2022-10-18 NOTE — TELEPHONE ENCOUNTER
Patient called and she c/o uti symptoms-scheduled for nurse visit to check urine and send for c/s-If needed

## 2022-10-20 LAB
ORGANISM: ABNORMAL
URINE CULTURE, ROUTINE: ABNORMAL

## 2022-10-20 RX ORDER — SULFAMETHOXAZOLE AND TRIMETHOPRIM 800; 160 MG/1; MG/1
1 TABLET ORAL 2 TIMES DAILY
Qty: 14 TABLET | Refills: 0 | Status: SHIPPED | OUTPATIENT
Start: 2022-10-20 | End: 2022-10-27

## 2022-11-11 DIAGNOSIS — G25.81 RESTLESS LEG: ICD-10-CM

## 2022-11-11 DIAGNOSIS — M54.50 CHRONIC MIDLINE LOW BACK PAIN WITHOUT SCIATICA: ICD-10-CM

## 2022-11-11 DIAGNOSIS — J44.9 MODERATE COPD (CHRONIC OBSTRUCTIVE PULMONARY DISEASE) (HCC): ICD-10-CM

## 2022-11-11 DIAGNOSIS — G89.29 CHRONIC MIDLINE LOW BACK PAIN WITHOUT SCIATICA: ICD-10-CM

## 2022-11-11 RX ORDER — ALBUTEROL SULFATE 90 UG/1
2 AEROSOL, METERED RESPIRATORY (INHALATION) EVERY 6 HOURS PRN
Qty: 6.7 G | Refills: 5 | Status: SHIPPED | OUTPATIENT
Start: 2022-11-11

## 2022-11-11 RX ORDER — ARIPIPRAZOLE 5 MG/1
5 TABLET ORAL DAILY
Qty: 30 TABLET | Refills: 5 | Status: SHIPPED | OUTPATIENT
Start: 2022-11-11

## 2022-11-11 RX ORDER — GABAPENTIN 100 MG/1
CAPSULE ORAL
Qty: 60 CAPSULE | Refills: 5 | OUTPATIENT
Start: 2022-11-11

## 2022-12-13 ENCOUNTER — TELEPHONE (OUTPATIENT)
Dept: PHARMACY | Facility: CLINIC | Age: 72
End: 2022-12-13

## 2022-12-13 NOTE — TELEPHONE ENCOUNTER
[facility] CL[facility]ARMAC[facility] REVIEW: STATIN THERAPY  Identified statin use in persons with cardiovascular disease (SPC) care gap per [place]; Records dated: 12/04/2022    Last Office Visit: 08/18/2022 w/ PCP    No Known Allergies    ASSESSMENT  STATIN GAP IDENTIFIED    Per chart review, patient is prescribed: atorvastatin 10mg daily    Per Reconciled Dispense Report as of 12/13/2022:    Lab Results   Component Value Date    CHOL 241 (H) 11/12/2020    TRIG 122 11/12/2020    HDL 62 (H) 11/12/2020    LDLCALC 155 (H) 11/12/2020     ALT   Date Value Ref Range Status   03/10/2022 6 (L) 10 - 40 U/L Final     AST   Date Value Ref Range Status   03/10/2022 9 (L) 15 - 37 IU/L Final     The 10-year ASCVD risk score (Crystal CHAUDHARY, et al., 2019) is: 9.9%    Values used to calculate the score:      Age: 67 years      Sex: Female      Is Non- : No      Diabetic: No      Tobacco smoker: No      Systolic Blood Pressure: 281 mmHg      Is BP treated: No      HDL Cholesterol: 62 mg/dL      Total Cholesterol: 241 mg/dL     Patient has been identified as having a diagnosis for clinical ASCVD or event (e.g., inpatient hospitalization for MI, CABG, PCI or other revascularization procedures) in the measurement year and not currently filling a moderate or high intensity statin. Patients included in this care gap are males age 18-72 and females age 43-69. PLAN  The following are interventions that have been identified:   - Patient identified as having SPC gap    Patient's LDL >130, ASCVD risk score 9.9%, age 43-69 years, LFTs WNL, no allergies/intolerances to statins, patient has abdominal aortic aneurysm, stable angina pectoris, history of facial droop.       Latest Reference Range & Units 3/6/18 09:52 11/12/20 12:04   CHOLESTEROL, TOTAL, 398110 0 - 199 mg/dL 234 (H)    Cholesterol, Fasting 0 - 199 mg/dL  241 (H)   HDL Cholesterol 40 - 60 mg/dL 100 (H) 62 (H)   LDL Calculated <100 mg/dL 118 (H) 155 (H) Triglycerides 0 - 150 mg/dL 79    Triglyceride, Fasting 0 - 150 mg/dL  122     Previous provider initiated statin therapy for elevated cholesterol. Could consider updated labs in case she has significantly improved diet. Will reach out to patient to discuss statin therapy. No future appointments.     Lakisha Theodore, PharmD, BCACP, [facility]  Department, toll free: [phone], option 1    =======================================================    For Pharmacy Admin Tracking Only  Gap Closed?: No   Time Spent (min): 30

## 2022-12-13 NOTE — LETTER
[place]  [address], [address]  [address]           12/13/22     Dear Plessis Stagers,    We tried to reach you recently regarding your medications but were unable to reach you on the telephone. Our team of pharmacists works with your primary care provider Hue Felton) to make sure patients are on appropriate medications for their chronic conditions. Patients with a history of cardiovascular disease should be taking a medication called a statin to prevent future events and slow disease progression. Your medication list in our system lists that you take atorvastatin 10mg daily. It appears this has not been filled at a pharmacy in over 2 years. If you have had side effects to atorvastatin, there are other medications in this class that can be tried or alternative dosing strategies (every other day versus daily). We can go over these options with you if you are concerned about this. Please give us a call back so we can discuss statin therapy and possibly restart if you have not been taking.      Thank you,  Lakisha Theodore, PharmD, BCACP, [facility], toll free: [phone], option 1

## 2022-12-27 DIAGNOSIS — G25.81 RESTLESS LEG: ICD-10-CM

## 2022-12-27 DIAGNOSIS — M54.50 CHRONIC MIDLINE LOW BACK PAIN WITHOUT SCIATICA: ICD-10-CM

## 2022-12-27 DIAGNOSIS — G89.29 CHRONIC MIDLINE LOW BACK PAIN WITHOUT SCIATICA: ICD-10-CM

## 2022-12-28 RX ORDER — GABAPENTIN 100 MG/1
CAPSULE ORAL
Qty: 60 CAPSULE | Refills: 5 | OUTPATIENT
Start: 2022-12-28

## 2022-12-28 RX ORDER — TRAMADOL HYDROCHLORIDE 50 MG/1
TABLET ORAL
Qty: 90 TABLET | Refills: 5 | OUTPATIENT
Start: 2022-12-28

## 2023-01-03 ENCOUNTER — OFFICE VISIT (OUTPATIENT)
Dept: FAMILY MEDICINE CLINIC | Age: 73
End: 2023-01-03
Payer: MEDICARE

## 2023-01-03 VITALS
WEIGHT: 128.4 LBS | BODY MASS INDEX: 25.08 KG/M2 | OXYGEN SATURATION: 95 % | SYSTOLIC BLOOD PRESSURE: 120 MMHG | DIASTOLIC BLOOD PRESSURE: 64 MMHG | RESPIRATION RATE: 18 BRPM | HEART RATE: 74 BPM | TEMPERATURE: 97.6 F

## 2023-01-03 DIAGNOSIS — G25.81 RESTLESS LEG: ICD-10-CM

## 2023-01-03 DIAGNOSIS — F51.01 PRIMARY INSOMNIA: ICD-10-CM

## 2023-01-03 DIAGNOSIS — G89.29 CHRONIC MIDLINE LOW BACK PAIN WITHOUT SCIATICA: ICD-10-CM

## 2023-01-03 DIAGNOSIS — M54.50 CHRONIC MIDLINE LOW BACK PAIN WITHOUT SCIATICA: ICD-10-CM

## 2023-01-03 DIAGNOSIS — R05.1 ACUTE COUGH: ICD-10-CM

## 2023-01-03 DIAGNOSIS — J44.1 COPD EXACERBATION (HCC): Primary | ICD-10-CM

## 2023-01-03 PROCEDURE — 1123F ACP DISCUSS/DSCN MKR DOCD: CPT | Performed by: NURSE PRACTITIONER

## 2023-01-03 PROCEDURE — 99213 OFFICE O/P EST LOW 20 MIN: CPT | Performed by: NURSE PRACTITIONER

## 2023-01-03 RX ORDER — TRAZODONE HYDROCHLORIDE 100 MG/1
TABLET ORAL
Qty: 60 TABLET | Refills: 5 | Status: SHIPPED | OUTPATIENT
Start: 2023-01-03

## 2023-01-03 RX ORDER — GUAIFENESIN/DEXTROMETHORPHAN 100-10MG/5
5 SYRUP ORAL 4 TIMES DAILY PRN
Qty: 200 ML | Refills: 0 | Status: SHIPPED | OUTPATIENT
Start: 2023-01-03 | End: 2023-01-13

## 2023-01-03 RX ORDER — PREDNISONE 20 MG/1
40 TABLET ORAL DAILY
Qty: 10 TABLET | Refills: 0 | Status: SHIPPED | OUTPATIENT
Start: 2023-01-03 | End: 2023-01-08

## 2023-01-03 RX ORDER — AZITHROMYCIN 250 MG/1
250 TABLET, FILM COATED ORAL SEE ADMIN INSTRUCTIONS
Qty: 6 TABLET | Refills: 0 | Status: SHIPPED | OUTPATIENT
Start: 2023-01-03 | End: 2023-01-08

## 2023-01-03 RX ORDER — GABAPENTIN 100 MG/1
100 CAPSULE ORAL 2 TIMES DAILY
Qty: 60 CAPSULE | Refills: 2 | Status: SHIPPED | OUTPATIENT
Start: 2023-01-03 | End: 2023-07-02

## 2023-01-03 ASSESSMENT — ENCOUNTER SYMPTOMS
SHORTNESS OF BREATH: 1
COUGH: 1
HOARSE VOICE: 1
SORE THROAT: 0
WHEEZING: 1
SPUTUM PRODUCTION: 0
DIFFICULTY BREATHING: 1
RHINORRHEA: 1

## 2023-01-03 ASSESSMENT — PATIENT HEALTH QUESTIONNAIRE - PHQ9
6. FEELING BAD ABOUT YOURSELF - OR THAT YOU ARE A FAILURE OR HAVE LET YOURSELF OR YOUR FAMILY DOWN: 0
1. LITTLE INTEREST OR PLEASURE IN DOING THINGS: 0
7. TROUBLE CONCENTRATING ON THINGS, SUCH AS READING THE NEWSPAPER OR WATCHING TELEVISION: 0
2. FEELING DOWN, DEPRESSED OR HOPELESS: 0
4. FEELING TIRED OR HAVING LITTLE ENERGY: 3
SUM OF ALL RESPONSES TO PHQ QUESTIONS 1-9: 6
5. POOR APPETITE OR OVEREATING: 0
SUM OF ALL RESPONSES TO PHQ9 QUESTIONS 1 & 2: 0
SUM OF ALL RESPONSES TO PHQ QUESTIONS 1-9: 6
10. IF YOU CHECKED OFF ANY PROBLEMS, HOW DIFFICULT HAVE THESE PROBLEMS MADE IT FOR YOU TO DO YOUR WORK, TAKE CARE OF THINGS AT HOME, OR GET ALONG WITH OTHER PEOPLE: 0
9. THOUGHTS THAT YOU WOULD BE BETTER OFF DEAD, OR OF HURTING YOURSELF: 0
3. TROUBLE FALLING OR STAYING ASLEEP: 3
8. MOVING OR SPEAKING SO SLOWLY THAT OTHER PEOPLE COULD HAVE NOTICED. OR THE OPPOSITE, BEING SO FIGETY OR RESTLESS THAT YOU HAVE BEEN MOVING AROUND A LOT MORE THAN USUAL: 0

## 2023-01-03 ASSESSMENT — COPD QUESTIONNAIRES: COPD: 1

## 2023-01-03 NOTE — PROGRESS NOTES
Christiano Nieto (:  1950) is a 67 y.o. female,Established patient, here for evaluation of the following chief complaint(s): Other (Has cough and congestion since Thursday . Started doing breathing treatment.   ) and Flu Vaccine (Already had flu vaccine)         ASSESSMENT/PLAN:  1. COPD exacerbation (HCC)  -     predniSONE (DELTASONE) 20 MG tablet; Take 2 tablets by mouth daily for 5 days, Disp-10 tablet, R-0Normal  -     azithromycin (ZITHROMAX) 250 MG tablet; Take 1 tablet by mouth See Admin Instructions for 5 days 500mg on day 1 followed by 250mg on days 2 - 5, Disp-6 tablet, R-0Normal  2. Primary insomnia  -     traZODone (DESYREL) 100 MG tablet; 1-2 tabs at bedtime, Disp-60 tablet, R-5Normal  3. Chronic midline low back pain without sciatica  -     gabapentin (NEURONTIN) 100 MG capsule; Take 1 capsule by mouth 2 times daily for 180 days. Intended supply: 30 days, Disp-60 capsule, R-2Normal  4. Restless leg  -     gabapentin (NEURONTIN) 100 MG capsule; Take 1 capsule by mouth 2 times daily for 180 days. Intended supply: 30 days, Disp-60 capsule, R-2Normal  5. Acute cough  -     guaiFENesin-dextromethorphan (ROBITUSSIN DM) 100-10 MG/5ML syrup; Take 5 mLs by mouth 4 times daily as needed for Cough, Disp-200 mL, R-0Normal      Return in about 3 months (around 4/3/2023) for w pcp. Subjective   SUBJECTIVE/OBJECTIVE:  Other  Associated symptoms include coughing. Pertinent negatives include no chest pain, fever or sore throat. COPD  She complains of cough, difficulty breathing, hoarse voice, shortness of breath and wheezing. There is no sputum production. This is a chronic problem. The current episode started more than 1 year ago. The problem occurs constantly. The problem has been gradually worsening. The cough is non-productive, hoarse, croupy and barking. Associated symptoms include malaise/fatigue, nasal congestion and rhinorrhea.  Pertinent negatives include no appetite change, chest pain, dyspnea on exertion, fever or sore throat. Her symptoms are alleviated by cold air (breathing treatment at home. with some relief). She reports minimal improvement on treatment. Review of Systems   Constitutional:  Positive for malaise/fatigue. Negative for appetite change and fever. HENT:  Positive for hoarse voice and rhinorrhea. Negative for sore throat. Respiratory:  Positive for cough, shortness of breath and wheezing. Negative for sputum production. Cardiovascular:  Negative for chest pain and dyspnea on exertion. Objective   Physical Exam             An electronic signature was used to authenticate this note.     --Araseli Villagomez, LAMBERT - CNP

## 2023-02-24 DIAGNOSIS — I71.40 ABDOMINAL AORTIC ANEURYSM (AAA) WITHOUT RUPTURE (HCC): ICD-10-CM

## 2023-03-21 DIAGNOSIS — M54.50 CHRONIC MIDLINE LOW BACK PAIN WITHOUT SCIATICA: ICD-10-CM

## 2023-03-21 DIAGNOSIS — G25.81 RESTLESS LEG: ICD-10-CM

## 2023-03-21 DIAGNOSIS — G89.29 CHRONIC MIDLINE LOW BACK PAIN WITHOUT SCIATICA: ICD-10-CM

## 2023-03-21 RX ORDER — GABAPENTIN 100 MG/1
CAPSULE ORAL
Qty: 60 CAPSULE | Refills: 2 | OUTPATIENT
Start: 2023-03-21

## 2023-05-05 ENCOUNTER — OFFICE VISIT (OUTPATIENT)
Dept: FAMILY MEDICINE CLINIC | Age: 73
End: 2023-05-05
Payer: MEDICARE

## 2023-05-05 VITALS
OXYGEN SATURATION: 95 % | RESPIRATION RATE: 18 BRPM | DIASTOLIC BLOOD PRESSURE: 72 MMHG | SYSTOLIC BLOOD PRESSURE: 124 MMHG | HEART RATE: 90 BPM | WEIGHT: 129.2 LBS | BODY MASS INDEX: 25.23 KG/M2

## 2023-05-05 DIAGNOSIS — R82.90 ABNORMAL RESULT ON SCREENING URINE TEST: ICD-10-CM

## 2023-05-05 DIAGNOSIS — F32.1 CURRENT MODERATE EPISODE OF MAJOR DEPRESSIVE DISORDER WITHOUT PRIOR EPISODE (HCC): ICD-10-CM

## 2023-05-05 DIAGNOSIS — J44.1 CHRONIC OBSTRUCTIVE PULMONARY DISEASE WITH ACUTE EXACERBATION (HCC): ICD-10-CM

## 2023-05-05 DIAGNOSIS — M54.50 CHRONIC MIDLINE LOW BACK PAIN WITHOUT SCIATICA: ICD-10-CM

## 2023-05-05 DIAGNOSIS — I71.43 INFRARENAL ABDOMINAL AORTIC ANEURYSM (AAA) WITHOUT RUPTURE (HCC): ICD-10-CM

## 2023-05-05 DIAGNOSIS — Z12.31 ENCOUNTER FOR SCREENING MAMMOGRAM FOR BREAST CANCER: ICD-10-CM

## 2023-05-05 DIAGNOSIS — G89.29 CHRONIC MIDLINE LOW BACK PAIN WITHOUT SCIATICA: ICD-10-CM

## 2023-05-05 DIAGNOSIS — I20.8 STABLE ANGINA PECTORIS (HCC): ICD-10-CM

## 2023-05-05 DIAGNOSIS — R30.0 DYSURIA: ICD-10-CM

## 2023-05-05 DIAGNOSIS — F51.01 PRIMARY INSOMNIA: ICD-10-CM

## 2023-05-05 DIAGNOSIS — E27.8 LEFT ADRENAL MASS (HCC): ICD-10-CM

## 2023-05-05 DIAGNOSIS — Z78.0 POST-MENOPAUSAL: Primary | ICD-10-CM

## 2023-05-05 PROBLEM — R07.89 OTHER CHEST PAIN: Status: RESOLVED | Noted: 2021-03-19 | Resolved: 2023-05-05

## 2023-05-05 PROBLEM — R45.84 ANHEDONIA: Status: RESOLVED | Noted: 2018-10-31 | Resolved: 2023-05-05

## 2023-05-05 PROBLEM — H92.01 MASTOID PAIN, RIGHT: Status: RESOLVED | Noted: 2021-11-18 | Resolved: 2023-05-05

## 2023-05-05 PROBLEM — R42 DIZZINESS: Status: RESOLVED | Noted: 2021-11-18 | Resolved: 2023-05-05

## 2023-05-05 LAB
BILIRUBIN, POC: NEGATIVE
BLOOD URINE, POC: NEGATIVE
CLARITY, POC: ABNORMAL
COLOR, POC: YELLOW
GLUCOSE URINE, POC: NEGATIVE
KETONES, POC: NEGATIVE
LEUKOCYTE EST, POC: ABNORMAL
NITRITE, POC: NEGATIVE
PH, POC: 5
PROTEIN, POC: NEGATIVE
SPECIFIC GRAVITY, POC: 1.02
UROBILINOGEN, POC: ABNORMAL

## 2023-05-05 PROCEDURE — 81002 URINALYSIS NONAUTO W/O SCOPE: CPT | Performed by: PHYSICIAN ASSISTANT

## 2023-05-05 RX ORDER — TRAZODONE HYDROCHLORIDE 100 MG/1
TABLET ORAL
Qty: 60 TABLET | Refills: 5 | Status: SHIPPED | OUTPATIENT
Start: 2023-05-05

## 2023-05-05 RX ORDER — PREDNISONE 20 MG/1
20 TABLET ORAL 2 TIMES DAILY
Qty: 10 TABLET | Refills: 0 | Status: SHIPPED | OUTPATIENT
Start: 2023-05-05 | End: 2023-05-10

## 2023-05-05 RX ORDER — TRAMADOL HYDROCHLORIDE 50 MG/1
50 TABLET ORAL EVERY 8 HOURS PRN
Qty: 90 TABLET | Refills: 2 | Status: SHIPPED | OUTPATIENT
Start: 2023-05-05 | End: 2023-05-08

## 2023-05-05 RX ORDER — TIZANIDINE 4 MG/1
TABLET ORAL
Qty: 40 TABLET | Refills: 2 | Status: SHIPPED | OUTPATIENT
Start: 2023-05-05

## 2023-05-05 SDOH — ECONOMIC STABILITY: FOOD INSECURITY: WITHIN THE PAST 12 MONTHS, YOU WORRIED THAT YOUR FOOD WOULD RUN OUT BEFORE YOU GOT MONEY TO BUY MORE.: NEVER TRUE

## 2023-05-05 SDOH — ECONOMIC STABILITY: HOUSING INSECURITY
IN THE LAST 12 MONTHS, WAS THERE A TIME WHEN YOU DID NOT HAVE A STEADY PLACE TO SLEEP OR SLEPT IN A SHELTER (INCLUDING NOW)?: NO

## 2023-05-05 SDOH — ECONOMIC STABILITY: FOOD INSECURITY: WITHIN THE PAST 12 MONTHS, THE FOOD YOU BOUGHT JUST DIDN'T LAST AND YOU DIDN'T HAVE MONEY TO GET MORE.: NEVER TRUE

## 2023-05-05 SDOH — ECONOMIC STABILITY: INCOME INSECURITY: HOW HARD IS IT FOR YOU TO PAY FOR THE VERY BASICS LIKE FOOD, HOUSING, MEDICAL CARE, AND HEATING?: NOT HARD AT ALL

## 2023-05-05 ASSESSMENT — ENCOUNTER SYMPTOMS
GASTROINTESTINAL NEGATIVE: 1
NAUSEA: 0
VOMITING: 0
EYES NEGATIVE: 1
BACK PAIN: 1
ABDOMINAL PAIN: 0
SHORTNESS OF BREATH: 1
WHEEZING: 1

## 2023-05-05 ASSESSMENT — PATIENT HEALTH QUESTIONNAIRE - PHQ9
4. FEELING TIRED OR HAVING LITTLE ENERGY: 2
8. MOVING OR SPEAKING SO SLOWLY THAT OTHER PEOPLE COULD HAVE NOTICED. OR THE OPPOSITE, BEING SO FIGETY OR RESTLESS THAT YOU HAVE BEEN MOVING AROUND A LOT MORE THAN USUAL: 1
SUM OF ALL RESPONSES TO PHQ QUESTIONS 1-9: 3
1. LITTLE INTEREST OR PLEASURE IN DOING THINGS: 0
SUM OF ALL RESPONSES TO PHQ9 QUESTIONS 1 & 2: 0
SUM OF ALL RESPONSES TO PHQ QUESTIONS 1-9: 3
7. TROUBLE CONCENTRATING ON THINGS, SUCH AS READING THE NEWSPAPER OR WATCHING TELEVISION: 0
SUM OF ALL RESPONSES TO PHQ QUESTIONS 1-9: 3
SUM OF ALL RESPONSES TO PHQ QUESTIONS 1-9: 3
3. TROUBLE FALLING OR STAYING ASLEEP: 0
10. IF YOU CHECKED OFF ANY PROBLEMS, HOW DIFFICULT HAVE THESE PROBLEMS MADE IT FOR YOU TO DO YOUR WORK, TAKE CARE OF THINGS AT HOME, OR GET ALONG WITH OTHER PEOPLE: 0
6. FEELING BAD ABOUT YOURSELF - OR THAT YOU ARE A FAILURE OR HAVE LET YOURSELF OR YOUR FAMILY DOWN: 0
9. THOUGHTS THAT YOU WOULD BE BETTER OFF DEAD, OR OF HURTING YOURSELF: 0
5. POOR APPETITE OR OVEREATING: 0
2. FEELING DOWN, DEPRESSED OR HOPELESS: 0

## 2023-05-05 NOTE — PATIENT INSTRUCTIONS
We are committed to providing you the best care possible. If you receive a survey after visiting one of our offices, please take time to share your experience concerning your physician office visit. These surveys are confidential and no health information about you is shared. We are eager to improve for you and continue to give you satisfactory care, we are counting on your feedback to help make that happen. Welcome to [facility]:    Did you know we now have a faster way for you to move through your appointment? For your convenience, we now have digital registration available. When you schedule your next appointment, you will receive a link via your email as well as a text message that will allow you to complete any paperwork digitally before your appointment.

## 2023-05-05 NOTE — ASSESSMENT & PLAN NOTE
Reviewed good sleep hygiene, consider adding 5-10 mg melatonin prn to help with symptoms, do not take more than prescribed amount of trazodone (pt reports occ taking third pill-reviewed risks/se)

## 2023-05-05 NOTE — PROGRESS NOTES
and Rhythm: Normal rate and regular rhythm. Heart sounds: Normal heart sounds. Pulmonary:      Breath sounds: Wheezing present. Abdominal:      General: Bowel sounds are normal.      Palpations: Abdomen is soft. Tenderness: There is no abdominal tenderness. There is no right CVA tenderness or left CVA tenderness. Musculoskeletal:         General: Tenderness present. Cervical back: Normal range of motion and neck supple. Skin:     General: Skin is warm and dry. Neurological:      Mental Status: She is alert and oriented to person, place, and time. Psychiatric:         Mood and Affect: Mood normal.         Behavior: Behavior normal.       Angel Andrew:    Problem List          Circulatory    Stable angina pectoris (HCC)     Stable, denies cp at this time          Relevant Medications    tiZANidine (ZANAFLEX) 4 MG tablet    traMADol (ULTRAM) 50 MG tablet    traZODone (DESYREL) 100 MG tablet    predniSONE (DELTASONE) 20 MG tablet    Infrarenal abdominal aortic aneurysm (AAA) without rupture ([facility])      Monitored by specialist- no acute findings meriting change in the plan            Respiratory    COPD (chronic obstructive pulmonary disease) (HCC)     Steroid as directed          Relevant Medications    ipratropium-albuterol (DUONEB) 0.5-2.5 (3) MG/3ML SOLN nebulizer solution    ANORO ELLIPTA 62.5-25 MCG/INH AEPB inhaler    albuterol sulfate HFA (PROVENTIL;VENTOLIN;PROAIR) 108 (90 Base) MCG/ACT inhaler    predniSONE (DELTASONE) 20 MG tablet       Other    Left adrenal mass (HCC) (Chronic)     Pt does not want to pursue further work up at this time.  Will readdress at next OV          Primary insomnia     Reviewed good sleep hygiene, consider adding 5-10 mg melatonin prn to help with symptoms, do not take more than prescribed amount of trazodone (pt reports occ taking third pill-reviewed risks/se)          Relevant Medications    traZODone (DESYREL) 100 MG tablet    RESOLVED: Post-menopausal - Primary

## 2023-05-05 NOTE — ASSESSMENT & PLAN NOTE
Will resume tramadol, needs UDS next visit, Risks/benefits/SE reviewed, pt voices understanding  Recheck three months

## 2023-05-07 LAB
BACTERIA UR CULT: ABNORMAL
ORGANISM: ABNORMAL

## 2023-05-08 LAB
BACTERIA UR CULT: ABNORMAL
ORGANISM: ABNORMAL

## 2023-05-08 RX ORDER — NITROFURANTOIN 25; 75 MG/1; MG/1
100 CAPSULE ORAL 2 TIMES DAILY
Qty: 14 CAPSULE | Refills: 0 | Status: SHIPPED | OUTPATIENT
Start: 2023-05-08 | End: 2023-05-15

## 2023-06-16 DIAGNOSIS — M54.50 CHRONIC MIDLINE LOW BACK PAIN WITHOUT SCIATICA: ICD-10-CM

## 2023-06-16 DIAGNOSIS — G89.29 CHRONIC MIDLINE LOW BACK PAIN WITHOUT SCIATICA: ICD-10-CM

## 2023-06-16 RX ORDER — TRAMADOL HYDROCHLORIDE 50 MG/1
TABLET ORAL
Qty: 90 TABLET | Refills: 2 | OUTPATIENT
Start: 2023-06-16

## 2023-07-12 ENCOUNTER — APPOINTMENT (OUTPATIENT)
Dept: CT IMAGING | Age: 73
End: 2023-07-12
Attending: EMERGENCY MEDICINE
Payer: MEDICARE

## 2023-07-12 ENCOUNTER — HOSPITAL ENCOUNTER (EMERGENCY)
Age: 73
Discharge: HOME | End: 2023-07-12
Attending: EMERGENCY MEDICINE
Payer: MEDICARE

## 2023-07-12 VITALS
HEART RATE: 68 BPM | SYSTOLIC BLOOD PRESSURE: 122 MMHG | TEMPERATURE: 97 F | BODY MASS INDEX: 25.8 KG/M2 | WEIGHT: 128 LBS | DIASTOLIC BLOOD PRESSURE: 49 MMHG | HEIGHT: 59 IN | OXYGEN SATURATION: 91 % | RESPIRATION RATE: 16 BRPM

## 2023-07-12 DIAGNOSIS — R09.1 PLEURISY: ICD-10-CM

## 2023-07-12 DIAGNOSIS — R10.9 LEFT FLANK PAIN: Primary | ICD-10-CM

## 2023-07-12 LAB
ALBUMIN SERPL-MCNC: 4 GM/DL (ref 3.4–5)
ALP BLD-CCNC: 62 IU/L (ref 40–129)
ALT SERPL-CCNC: 6 U/L (ref 10–40)
ANION GAP SERPL CALCULATED.3IONS-SCNC: 8 MMOL/L (ref 4–16)
APTT: 31.8 SECONDS (ref 25.1–37.1)
AST SERPL-CCNC: 11 IU/L (ref 15–37)
BASOPHILS ABSOLUTE: 0 K/CU MM
BASOPHILS RELATIVE PERCENT: 0.6 % (ref 0–1)
BILIRUB SERPL-MCNC: 0.2 MG/DL (ref 0–1)
BILIRUBIN URINE: NEGATIVE MG/DL
BLOOD, URINE: NEGATIVE
BUN SERPL-MCNC: 12 MG/DL (ref 6–23)
CALCIUM SERPL-MCNC: 8.9 MG/DL (ref 8.3–10.6)
CHLORIDE BLD-SCNC: 105 MMOL/L (ref 99–110)
CLARITY: CLEAR
CO2: 24 MMOL/L (ref 21–32)
COLOR: YELLOW
COMMENT UA: NORMAL
CREAT SERPL-MCNC: 0.6 MG/DL (ref 0.6–1.1)
DIFFERENTIAL TYPE: ABNORMAL
EOSINOPHILS ABSOLUTE: 0.2 K/CU MM
EOSINOPHILS RELATIVE PERCENT: 3.3 % (ref 0–3)
GFR SERPL CREATININE-BSD FRML MDRD: >60 ML/MIN/1.73M2
GLUCOSE SERPL-MCNC: 101 MG/DL (ref 70–99)
GLUCOSE, URINE: NEGATIVE MG/DL
HCT VFR BLD CALC: 39.5 % (ref 37–47)
HEMOGLOBIN: 13.1 GM/DL (ref 12.5–16)
IMMATURE NEUTROPHIL %: 0.1 % (ref 0–0.43)
INR BLD: 0.9 INDEX
KETONES, URINE: NEGATIVE MG/DL
LEUKOCYTE ESTERASE, URINE: NEGATIVE
LIPASE: 18 IU/L (ref 13–60)
LYMPHOCYTES ABSOLUTE: 1.9 K/CU MM
LYMPHOCYTES RELATIVE PERCENT: 26.7 % (ref 24–44)
MCH RBC QN AUTO: 32 PG (ref 27–31)
MCHC RBC AUTO-ENTMCNC: 33.2 % (ref 32–36)
MCV RBC AUTO: 96.6 FL (ref 78–100)
MONOCYTES ABSOLUTE: 0.5 K/CU MM
MONOCYTES RELATIVE PERCENT: 7.2 % (ref 0–4)
NITRITE URINE, QUANTITATIVE: NEGATIVE
PDW BLD-RTO: 13.6 % (ref 11.7–14.9)
PH, URINE: 6 (ref 5–8)
PLATELET # BLD: 223 K/CU MM (ref 140–440)
PMV BLD AUTO: 9.6 FL (ref 7.5–11.1)
POTASSIUM SERPL-SCNC: 4 MMOL/L (ref 3.5–5.1)
PROTEIN UA: NEGATIVE MG/DL
PROTHROMBIN TIME: 13.1 SECONDS (ref 11.7–14.5)
RBC # BLD: 4.09 M/CU MM (ref 4.2–5.4)
SEGMENTED NEUTROPHILS ABSOLUTE COUNT: 4.5 K/CU MM
SEGMENTED NEUTROPHILS RELATIVE PERCENT: 62.1 % (ref 36–66)
SODIUM BLD-SCNC: 137 MMOL/L (ref 135–145)
SPECIFIC GRAVITY UA: <1.005 (ref 1–1.03)
TOTAL IMMATURE NEUTOROPHIL: 0.01 K/CU MM
TOTAL PROTEIN: 6.4 GM/DL (ref 6.4–8.2)
UROBILINOGEN, URINE: 0.2 MG/DL (ref 0.2–1)
WBC # BLD: 7.2 K/CU MM (ref 4–10.5)

## 2023-07-12 PROCEDURE — 80053 COMPREHEN METABOLIC PANEL: CPT

## 2023-07-12 PROCEDURE — 6360000004 HC RX CONTRAST MEDICATION: Performed by: EMERGENCY MEDICINE

## 2023-07-12 PROCEDURE — 85025 COMPLETE CBC W/AUTO DIFF WBC: CPT

## 2023-07-12 PROCEDURE — 99285 EMERGENCY DEPT VISIT HI MDM: CPT

## 2023-07-12 PROCEDURE — 96375 TX/PRO/DX INJ NEW DRUG ADDON: CPT

## 2023-07-12 PROCEDURE — 74177 CT ABD & PELVIS W/CONTRAST: CPT

## 2023-07-12 PROCEDURE — 83690 ASSAY OF LIPASE: CPT

## 2023-07-12 PROCEDURE — 96374 THER/PROPH/DIAG INJ IV PUSH: CPT

## 2023-07-12 PROCEDURE — 85610 PROTHROMBIN TIME: CPT

## 2023-07-12 PROCEDURE — 71275 CT ANGIOGRAPHY CHEST: CPT

## 2023-07-12 PROCEDURE — 81003 URINALYSIS AUTO W/O SCOPE: CPT

## 2023-07-12 PROCEDURE — 85730 THROMBOPLASTIN TIME PARTIAL: CPT

## 2023-07-12 PROCEDURE — 6360000002 HC RX W HCPCS: Performed by: EMERGENCY MEDICINE

## 2023-07-12 RX ORDER — HYDROCODONE BITARTRATE AND ACETAMINOPHEN 5; 325 MG/1; MG/1
1 TABLET ORAL EVERY 6 HOURS PRN
Qty: 10 TABLET | Refills: 0 | Status: SHIPPED | OUTPATIENT
Start: 2023-07-12 | End: 2023-07-15

## 2023-07-12 RX ORDER — METHYLPREDNISOLONE 4 MG/1
TABLET ORAL
Qty: 1 KIT | Refills: 0 | Status: SHIPPED | OUTPATIENT
Start: 2023-07-12

## 2023-07-12 RX ORDER — ONDANSETRON 2 MG/ML
4 INJECTION INTRAMUSCULAR; INTRAVENOUS EVERY 30 MIN PRN
Status: DISCONTINUED | OUTPATIENT
Start: 2023-07-12 | End: 2023-07-12 | Stop reason: HOSPADM

## 2023-07-12 RX ORDER — KETOROLAC TROMETHAMINE 15 MG/ML
15 INJECTION, SOLUTION INTRAMUSCULAR; INTRAVENOUS ONCE
Status: COMPLETED | OUTPATIENT
Start: 2023-07-12 | End: 2023-07-12

## 2023-07-12 RX ORDER — LIDOCAINE 50 MG/G
1 PATCH TOPICAL DAILY
Qty: 30 PATCH | Refills: 0 | Status: SHIPPED | OUTPATIENT
Start: 2023-07-12 | End: 2023-08-11

## 2023-07-12 RX ORDER — MORPHINE SULFATE 4 MG/ML
4 INJECTION, SOLUTION INTRAMUSCULAR; INTRAVENOUS EVERY 30 MIN PRN
Status: DISCONTINUED | OUTPATIENT
Start: 2023-07-12 | End: 2023-07-12 | Stop reason: HOSPADM

## 2023-07-12 RX ADMIN — MORPHINE SULFATE 4 MG: 4 INJECTION, SOLUTION INTRAMUSCULAR; INTRAVENOUS at 13:47

## 2023-07-12 RX ADMIN — METHYLPREDNISOLONE SODIUM SUCCINATE 60 MG: 125 INJECTION, POWDER, FOR SOLUTION INTRAMUSCULAR; INTRAVENOUS at 16:00

## 2023-07-12 RX ADMIN — KETOROLAC TROMETHAMINE 15 MG: 15 INJECTION, SOLUTION INTRAMUSCULAR; INTRAVENOUS at 13:45

## 2023-07-12 RX ADMIN — ONDANSETRON 4 MG: 2 INJECTION INTRAMUSCULAR; INTRAVENOUS at 13:43

## 2023-07-12 RX ADMIN — IOPAMIDOL 90 ML: 755 INJECTION, SOLUTION INTRAVENOUS at 14:44

## 2023-07-12 ASSESSMENT — PAIN SCALES - GENERAL
PAINLEVEL_OUTOF10: 9
PAINLEVEL_OUTOF10: 9
PAINLEVEL_OUTOF10: 4
PAINLEVEL_OUTOF10: 9

## 2023-07-12 ASSESSMENT — PAIN DESCRIPTION - LOCATION
LOCATION: FLANK
LOCATION: BACK
LOCATION: FLANK

## 2023-07-12 ASSESSMENT — PAIN DESCRIPTION - DESCRIPTORS: DESCRIPTORS: ACHING;DISCOMFORT

## 2023-07-12 ASSESSMENT — PAIN - FUNCTIONAL ASSESSMENT
PAIN_FUNCTIONAL_ASSESSMENT: PREVENTS OR INTERFERES SOME ACTIVE ACTIVITIES AND ADLS
PAIN_FUNCTIONAL_ASSESSMENT: 0-10

## 2023-07-12 ASSESSMENT — PAIN DESCRIPTION - PAIN TYPE: TYPE: ACUTE PAIN

## 2023-07-12 ASSESSMENT — PAIN DESCRIPTION - ORIENTATION
ORIENTATION: LEFT

## 2023-07-12 ASSESSMENT — PAIN DESCRIPTION - ONSET: ONSET: ON-GOING

## 2023-07-12 ASSESSMENT — PAIN DESCRIPTION - FREQUENCY: FREQUENCY: CONTINUOUS

## 2023-07-12 NOTE — ED TRIAGE NOTES
Pt to room. Reports that she has been having left sided flank pain for 4 days. Ambulated to stretcher, call light in reach and provider to the room to eval patient.

## 2023-07-12 NOTE — DISCHARGE INSTRUCTIONS
Use Norco as needed for pain. Return to the ER if your condition worsens. Follow-up with your primary caregiver soon as possible for recheck.

## 2023-07-12 NOTE — ED PROVIDER NOTES
intra-abdominal process. Bilobed infrarenal abdominal aortic aneurysm with superior component   measuring up to 4.7 cm and inferior component measuring up to 4.2 cm compared   to 4 cm and 3.6 cm respectively on the previous scan. Recommend vascular   surgery referral.      RECOMMENDATIONS:   For management of fusiform AAA:      4.5-5.4 cm AAA, recommend follow-up every 6 months and recommend vascular   consultation. Note: Recommend Vascular consultation if a fusiform AAA enlarges by >0.5 cm   in 6 months or >1 cm in 1 year or for a saccular AAA of any size. References: Gal Danielleing Radiol 2013; 03(28):617-140; J Vasc Surg. 2018; 67:2-77         CT ABDOMEN PELVIS W IV CONTRAST   Final Result   No evidence of pulmonary embolism or acute pulmonary abnormality. Emphysema. No CT evidence of acute intra-abdominal process. Bilobed infrarenal abdominal aortic aneurysm with superior component   measuring up to 4.7 cm and inferior component measuring up to 4.2 cm compared   to 4 cm and 3.6 cm respectively on the previous scan. Recommend vascular   surgery referral.      RECOMMENDATIONS:   For management of fusiform AAA:      4.5-5.4 cm AAA, recommend follow-up every 6 months and recommend vascular   consultation. Note: Recommend Vascular consultation if a fusiform AAA enlarges by >0.5 cm   in 6 months or >1 cm in 1 year or for a saccular AAA of any size. References: Gal Danielleing Radiol 2013; 22(30):187-073; J Vasc Surg. 2018; 67:2-77             ED Course and MDM:  Patient presents to the emergency department with pain in her left flank. It is really in her left lower chest but is radiating around her abdomen into her left mid abdomen.   CC/HPI Summary, DDx, ED Course, and Reassessment: Work-up here in the ER included a CTA pulmonary to rule out PE and a CT abdomen and pelvis and contrast.  There is no evidence of PE or pulmonary abnormality on her chest CT and no evidence of acute

## 2023-07-13 DIAGNOSIS — G89.29 CHRONIC MIDLINE LOW BACK PAIN WITHOUT SCIATICA: ICD-10-CM

## 2023-07-13 DIAGNOSIS — M54.50 CHRONIC MIDLINE LOW BACK PAIN WITHOUT SCIATICA: ICD-10-CM

## 2023-07-14 RX ORDER — TRAMADOL HYDROCHLORIDE 50 MG/1
TABLET ORAL
Qty: 69 TABLET | Refills: 2 | OUTPATIENT
Start: 2023-07-14

## 2023-07-14 NOTE — TELEPHONE ENCOUNTER
Left voicemail informing patient that there is still a refill available for her Tramadol prescription.

## 2023-07-25 ENCOUNTER — TELEPHONE (OUTPATIENT)
Dept: PHARMACY | Facility: CLINIC | Age: 73
End: 2023-07-25

## 2023-07-25 NOTE — TELEPHONE ENCOUNTER
Amy Jorge PA-C    Note patient has upcoming appointment schedule with you 7/26/23 and 8/1/23  Would recommend updated lipid panel.- last on file from 2020 elevated. If appropriate, please consider adding rosuvastatin 5 mg daily (or other) to your patient's regimen for the following reason:   Patient has been identified as a statin use in persons with cardiovascular disease care gap per [place] ([facility]) and not currently filling a moderate or high intensity statin. Please let me know if any questions or if I can complete any further outreach. Thank you,  Shayan Garza, PharmD, [address] [place] [address] Pharmacist  Department, toll free: [phone], option 1   ====================================================    POPULATION HEALTH CLINICAL PHARMACY: STATIN THERAPY REVIEW  Identified statin use in persons with cardiovascular disease care gap per [place] ([facility]). Records dated: 7/8/23. Last Visit: 5/5/23  Future Appointments   Date Time Provider [address]   7/26/2023 10:40 AM [facility]   8/1/2023  1:00 PM [facility]     ASSESSMENT of Statin in Persons with Cardiovascular Disease Care Gap    Cain Stack  has been identified as having a diagnosis for clinical ASCVD or event (e.g., inpatient hospitalization for MI, CABG, PCI or other revascularization procedures) in the measurement year and not currently filling a moderate or high intensity statin. Patients included in this care gap are males age 18-72 and females age 37-78. Currently Prescribed a statin: no  Per Reconcile Dispense History:    Dispensed Days Supply Quantity Provider Pharmacy   ATORVASTATIN CALCIUM 10 MG 11/13/2020 90 90 each [facility] -... ATORVASTATIN TAB 40MG 03/08/2018 30 30 Device LAMBERT Chase - CNP [facility] -... Per chart review: Atorvastatin 10 mg last ordered 11/13/2020 for 90 day supply with 1 refill.  On 5/5/23 removed from

## 2023-07-26 ENCOUNTER — OFFICE VISIT (OUTPATIENT)
Dept: FAMILY MEDICINE CLINIC | Age: 73
End: 2023-07-26
Payer: MEDICARE

## 2023-07-26 VITALS
WEIGHT: 123.6 LBS | SYSTOLIC BLOOD PRESSURE: 112 MMHG | RESPIRATION RATE: 16 BRPM | BODY MASS INDEX: 24.96 KG/M2 | OXYGEN SATURATION: 95 % | DIASTOLIC BLOOD PRESSURE: 60 MMHG | HEART RATE: 74 BPM

## 2023-07-26 DIAGNOSIS — G89.29 CHRONIC MIDLINE LOW BACK PAIN WITHOUT SCIATICA: Primary | ICD-10-CM

## 2023-07-26 DIAGNOSIS — G89.29 CHRONIC BILATERAL THORACIC BACK PAIN: ICD-10-CM

## 2023-07-26 DIAGNOSIS — M54.50 CHRONIC MIDLINE LOW BACK PAIN WITHOUT SCIATICA: Primary | ICD-10-CM

## 2023-07-26 DIAGNOSIS — I71.43 INFRARENAL ABDOMINAL AORTIC ANEURYSM (AAA) WITHOUT RUPTURE (HCC): ICD-10-CM

## 2023-07-26 DIAGNOSIS — M54.6 CHRONIC BILATERAL THORACIC BACK PAIN: ICD-10-CM

## 2023-07-26 PROCEDURE — 99214 OFFICE O/P EST MOD 30 MIN: CPT | Performed by: PHYSICIAN ASSISTANT

## 2023-07-26 PROCEDURE — 1123F ACP DISCUSS/DSCN MKR DOCD: CPT | Performed by: PHYSICIAN ASSISTANT

## 2023-07-26 RX ORDER — TRAMADOL HYDROCHLORIDE 50 MG/1
50 TABLET ORAL EVERY 8 HOURS PRN
COMMUNITY
Start: 2023-07-13

## 2023-07-26 RX ORDER — TIZANIDINE 4 MG/1
TABLET ORAL
Qty: 90 TABLET | Refills: 2 | Status: SHIPPED | OUTPATIENT
Start: 2023-07-26

## 2023-07-26 ASSESSMENT — PATIENT HEALTH QUESTIONNAIRE - PHQ9
SUM OF ALL RESPONSES TO PHQ QUESTIONS 1-9: 0
SUM OF ALL RESPONSES TO PHQ QUESTIONS 1-9: 0
SUM OF ALL RESPONSES TO PHQ9 QUESTIONS 1 & 2: 0
1. LITTLE INTEREST OR PLEASURE IN DOING THINGS: 0
SUM OF ALL RESPONSES TO PHQ QUESTIONS 1-9: 0
SUM OF ALL RESPONSES TO PHQ QUESTIONS 1-9: 0
2. FEELING DOWN, DEPRESSED OR HOPELESS: 0

## 2023-07-26 NOTE — PROGRESS NOTES
Savannah Georges  1950  68 y.o.  female    SUBJECTIVE:    Chief Complaint   Patient presents with    Follow-up     Here for ED f/u-Wanting to discuss Rx for pain       HPI  Pt here today for post ER visit. Pt has hx chronic low and thoracic back pain for which she takes tramadol. Pt states she was seen in ER when pain became very severe and \"unbearable\". Routine medication was not helpful . Pt states pain radiated to chest/abd. CTA chest negative for PE and CT abd/pelvis negative for acute findings but noted to have slightly enlarging AAA. Pt follows with Dr. Scarlet Hoffmann for the AAA and was last seen in May of this year for routine eval. Pt states she was given small amount norco for home and this has helped greatly with pain control with very little side effects. As per documentation from [address] [place] [address] 5/5/2023:    Chronic back pain-Pain severe at times and causes difficulty with ADLs and leading to poorer quality of life due to pain. Pt is raising great grandchildren and pain control allows her to remain functional and able to provide care to great grandchildren. She had been using tramadol routinely and then decided to try gabapentin but is finding this is causing too much drowsiness/issues with driving. Would like to resume tramadol at this time. AAA-follows with Dr. Scarlet Hoffmann. Last CT showed aneurysm is 4.7 in largest dimension and 4.3 in largest dimension on the other lobe of the aneurysm. She does have a stable left adrenal mass that is 3 cm versus adrenal hyperplasia. She is to repeat CT in August with f/u after with Dr. Scarlet Hoffmann. She was referred to Dr. Karen Suazo for evaluation of adrenal mass but pt did not make f/u. States today she does not want to further evaluation at this time but will consider it. She also declines mammogram/colon cancer screening today also despite PCP reviewing risks with pt .      PHQ Scores 7/26/2023 5/5/2023 1/3/2023 8/18/2022 5/20/2022 3/10/2022 11/18/2021   PHQ2 Score 0 0 0 5 2 0 5   PHQ9

## 2023-07-27 RX ORDER — HYDROCODONE BITARTRATE AND ACETAMINOPHEN 5; 325 MG/1; MG/1
1 TABLET ORAL EVERY 8 HOURS PRN
Qty: 9 TABLET | Refills: 0 | Status: SHIPPED | OUTPATIENT
Start: 2023-07-27 | End: 2023-07-30

## 2023-07-27 ASSESSMENT — ENCOUNTER SYMPTOMS
RESPIRATORY NEGATIVE: 1
BACK PAIN: 1
ABDOMINAL PAIN: 0

## 2023-07-27 NOTE — ASSESSMENT & PLAN NOTE
Reviewed ER notes/images/labs/etc... today. Pt advised PCP will refill norco for short period, use only for moderate to severe pain and do not take at same time as tramadol. Pt states norco seems to help better with pain than the tramadol that she has been on chronically. Advised if she feels this is something she would like to continue long term, PCP will refer to pain management.  Pt voices understanding

## 2023-07-27 NOTE — ASSESSMENT & PLAN NOTE
Pt following with Dr. Ezequiel Cartagena and was seen in May. Advised to let him know she was recently seen in ER and AAA slightly larger than previously noted on imaging completed at [facility] facility.  However, measurements appear stable to last CT with Dr. Ezequiel Cartagena

## 2023-07-31 NOTE — TELEPHONE ENCOUNTER
Future Appointments   Date Time Provider [address]   8/8/2023  1:00 PM Norma Lane, PT [facility] SPT PT Deya Velazquez   10/26/2023  9:20 AM Nanette Hoang PA-C [facility]     For Pharmacy Admin Tracking Only    Program: Deangelo in place:  No  Recommendation Provided To: Provider: 1 via Note to Provider  Intervention Detail: New Rx: 1, reason: Needs Additional Therapy  Intervention Accepted By: Provider: 0  Gap Closed?: No   Time Spent (min): 15

## 2023-08-08 ENCOUNTER — HOSPITAL ENCOUNTER (OUTPATIENT)
Dept: PHYSICAL THERAPY | Age: 73
Setting detail: THERAPIES SERIES
Discharge: HOME | End: 2023-08-08
Payer: MEDICARE

## 2023-08-08 PROCEDURE — 97162 PT EVAL MOD COMPLEX 30 MIN: CPT

## 2023-08-08 PROCEDURE — 97530 THERAPEUTIC ACTIVITIES: CPT

## 2023-08-08 PROCEDURE — 97110 THERAPEUTIC EXERCISES: CPT

## 2023-08-08 NOTE — PROGRESS NOTES
[facility] Outpatient Physical Therapy  [address] [place] [address] [facility] [address]  Phone: [phone]  Fax: [phone]    PHYSICAL THERAPY INITIAL ASSESSMENT      Date: 2023  Patient Name: Sergei Rojas   : 1950  Referred by: Michael Mcallister PA-C  Reason for Referral: M54.50 chronic midline low back pain  PT Impression: M62.81 muscle weakness, R26.89 abnormality of gait  Insurance: [facility] Medicare  Restrictions/Precautions:  fall risk    Subjective   Chart Reviewed: Yes   Patient assessed for rehabilitation services?: Yes   Family / Caregiver Present: no  Follows Commands: Within Functional Limits   Date of onset:  acute on chronic. Pt reports she was leaning over cleaning the oven yesterday and today has been having a bad day today with increased pain. Subjective: Pt reports she   Current Situation: Pt wears a lift in L shoe. Observation:   Medication: updated in EMR    Pain Screening   Patient Currently in Pain:   Pain Assessment: 0-10   Pain Level: 6/10  in mid back/R side  Worst pain: 6/10   Best pain:   2-3/10   Sensation: unimpaired per pt report    Vision/Hearing   Vision: impaired. Pt wears glasses at all times  Hearing: Within functional limits Pt reports she has no ear drum on R    Home Living  Lives With: family raising 2 great granddaughters aged 8 and 15  Type of Home: house  Home Layout:  2 story with bed and bath downstairs. Steps/Hand rails: 4 steps with no HR  Toilet: standard  Bathroom: tub shower  Equipment: 4WW, SPC,   Work: retired  Hobbies: Pt reports she knits and works in books. She reports she does all the housework. Prior level of function:  Pt reports she has been seeing a chiropractor, but there is nothing he can do for her anymore. Patient reports hardest things at home: 1) walking 2) vacuuming/sweeping  3) bending over  Patient goals: pt reports she wants to be able to move and walk without all the pain and to strengthen back.      Orientation:

## 2023-08-08 NOTE — FLOWSHEET NOTE
Outpatient Physical Therapy  [place]           [] Phone: [phone]   Fax: [phone]  [facility]           [x] Phone: [phone]   Fax: [phone]        Physical Therapy Daily Treatment Note  Date:  2023    Patient Name:  Cain Stack    :  1950  MRN: [id number]  Restrictions/Precautions: fall risk     Diagnosis:   Chronic midline low back pain without sciatica [M54.50, G89.29]  Chronic bilateral thoracic back pain [M54.6, G89.29]    Date of Injury/Surgery: acute on chronic  Treatment Diagnosis:   M62.81 muscle weakness, R26.89 abnormality of gait  Insurance/Certification information:  [facility] Medicare  Referring Physician:  Amy Jorge PA-C     PCP: Amy Jorge PA-C  Plan of care signed (Y/N):  adam faxed  Outcome Measure: Oswestry:  = 42% disability   2MWT: 298.0 feet  Visit# / total visits:  1/10  Pain level: 6/10   Goals:     Patient goals:   pt reports she wants to be able to move and walk without all the pain and to strengthen back. Short term goals to be achieved by September 10, 2023:  Short term goal 1: Pt will report compliance with current HEP as prescribed in order to improve ROM and strength. Short term goal 2: Pt will demonstrate L hip flexion strength at least 4/5 in order to improve strength. Short term goal 3: Pt will demonstrate the ability to safely complete at least 325 feet on the 2 minute walk test in order to improve functional mobility. Short term goal 4: Pt will demonstrate an Oswestry score of no more than 35% disability in order to improve quality of life. Subjective:  See eval     Any changes in Ambulatory Summary Sheet?   None    Objective:  See eval     Exercises: (No more than 4 columns)   Exercise/Equipment Date: 23 Date Date           WARM UP       NuStep              TABLE                                       STANDING      Hooklying Glute set Seated 1x10 5\"     bridges      Hooklying TA contraction Seated 1x10 5\"     Hooklying

## 2023-08-10 ENCOUNTER — HOSPITAL ENCOUNTER (OUTPATIENT)
Dept: PHYSICAL THERAPY | Age: 73
Setting detail: THERAPIES SERIES
Discharge: HOME | End: 2023-08-10
Payer: MEDICARE

## 2023-08-10 PROCEDURE — 97530 THERAPEUTIC ACTIVITIES: CPT

## 2023-08-10 PROCEDURE — 97110 THERAPEUTIC EXERCISES: CPT

## 2023-08-10 NOTE — FLOWSHEET NOTE
Outpatient Physical Therapy  [place]           [] Phone: [phone]   Fax: [phone]  [facility]           [x] Phone: [phone]   Fax: [phone]        Physical Therapy Daily Treatment Note  Date:  8/10/2023    Patient Name:  Jocelyn Landaverde    :  1950  MRN: [id number]  Restrictions/Precautions: fall risk     Diagnosis:   Chronic midline low back pain without sciatica [M54.50, G89.29]  Chronic bilateral thoracic back pain [M54.6, G89.29]    Date of Injury/Surgery: acute on chronic  Treatment Diagnosis:   M62.81 muscle weakness, R26.89 abnormality of gait  Insurance/Certification information:  [facility] Medicare  Referring Physician:  Benitez Jonas PA-C     PCP: Benitez Jonas PA-C  Plan of care signed (Y/N):  adma faxed  Outcome Measure: Oswestry:  = 42% disability   2MWT: 298.0 feet  Visit# / total visits:  1/10  Pain level: 6/10   Goals:     Patient goals:   pt reports she wants to be able to move and walk without all the pain and to strengthen back. Short term goals to be achieved by September 10, 2023:  Short term goal 1: Pt will report compliance with current HEP as prescribed in order to improve ROM and strength. Short term goal 2: Pt will demonstrate L hip flexion strength at least 4/5 in order to improve strength. Short term goal 3: Pt will demonstrate the ability to safely complete at least 325 feet on the 2 minute walk test in order to improve functional mobility. Short term goal 4: Pt will demonstrate an Oswestry score of no more than 35% disability in order to improve quality of life. Subjective:  Patient states that > L side pain in back. Reports compliance with HEP    Any changes in Ambulatory Summary Sheet? None    Objective:  Mild antalgic gait; limp to L side; cues to improve postural awareness.     Exercises: (No more than 4 columns)   Exercise/Equipment Date: 23 Date 8/10/2023 Date           WARM UP       NuStep    next          TABLE

## 2023-08-14 DIAGNOSIS — G89.29 CHRONIC MIDLINE LOW BACK PAIN WITHOUT SCIATICA: Primary | ICD-10-CM

## 2023-08-14 DIAGNOSIS — M54.50 CHRONIC MIDLINE LOW BACK PAIN WITHOUT SCIATICA: Primary | ICD-10-CM

## 2023-08-14 RX ORDER — TRAMADOL HYDROCHLORIDE 50 MG/1
TABLET ORAL
Qty: 90 TABLET | Refills: 2 | Status: SHIPPED | OUTPATIENT
Start: 2023-08-14 | End: 2023-11-12

## 2023-08-17 ENCOUNTER — HOSPITAL ENCOUNTER (OUTPATIENT)
Dept: PHYSICAL THERAPY | Age: 73
Setting detail: THERAPIES SERIES
Discharge: HOME | End: 2023-08-17
Payer: MEDICARE

## 2023-08-17 PROCEDURE — 97112 NEUROMUSCULAR REEDUCATION: CPT

## 2023-08-17 PROCEDURE — 97110 THERAPEUTIC EXERCISES: CPT

## 2023-08-17 NOTE — FLOWSHEET NOTE
Outpatient Physical Therapy  [place]           [] Phone: [phone]   Fax: [phone]  [facility]           [x] Phone: [phone]   Fax: [phone]        Physical Therapy Daily Treatment Note  Date:  2023    Patient Name:  Magdi Vitale    :  1950  MRN: [id number]  Restrictions/Precautions: fall risk     Diagnosis:   Chronic midline low back pain without sciatica [M54.50, G89.29]  Chronic bilateral thoracic back pain [M54.6, G89.29]    Date of Injury/Surgery: acute on chronic  Treatment Diagnosis:   M62.81 muscle weakness, R26.89 abnormality of gait  Insurance/Certification information:  [facility] Medicare  Referring Physician:  Angelina Dumont PA-C     PCP: Angelina Dumont PA-C  Plan of care signed (Y/N):  adam faxed  Outcome Measure: Oswestry: 2150 = 42% disability   2MWT: 298.0 feet  Visit# / total visits:  2/10  Pain level: 5/10 left low back  Goals:     Patient goals:   pt reports she wants to be able to move and walk without all the pain and to strengthen back. Short term goals to be achieved by September 10, 2023:  Short term goal 1: Pt will report compliance with current HEP as prescribed in order to improve ROM and strength. Short term goal 2: Pt will demonstrate L hip flexion strength at least 4/5 in order to improve strength. Short term goal 3: Pt will demonstrate the ability to safely complete at least 325 feet on the 2 minute walk test in order to improve functional mobility. Short term goal 4: Pt will demonstrate an Oswestry score of no more than 35% disability in order to improve quality of life. Subjective:  Patient states that > L side pain in back rating it at a 5/10. Reports she was feeling better after her last treatment but was stiff. Any changes in Ambulatory Summary Sheet? None    Objective:  Mild antalgic gait; limp to L side; cues to improve postural awareness.     Exercises: (No more than 4 columns)   Exercise/Equipment Date: 23 Date 8/10/2023

## 2023-08-25 ENCOUNTER — TELEPHONE (OUTPATIENT)
Dept: FAMILY MEDICINE CLINIC | Age: 73
End: 2023-08-25

## 2023-08-25 RX ORDER — PREDNISONE 20 MG/1
20 TABLET ORAL 2 TIMES DAILY
Qty: 10 TABLET | Refills: 0 | Status: SHIPPED | OUTPATIENT
Start: 2023-08-25 | End: 2023-08-30

## 2023-08-25 NOTE — TELEPHONE ENCOUNTER
Patient called and is starting to have trouble with her COPD . Patient wanting to know if provider will call in Prednisone . Patient states she is short winded , using inhaler and nebulizer. Denies coughing up any sputum at this time . No fever. Offered walk in clinic but wants provider to take care of her not another provider    Please advise . Explained if her respiratory status gets worse to go to ER for evaluation.

## 2023-08-25 NOTE — TELEPHONE ENCOUNTER
Called patient and reviewed providers note with patient. Patient voices understanding.   No questions or concerns at this time

## 2023-08-29 ENCOUNTER — HOSPITAL ENCOUNTER (OUTPATIENT)
Dept: PHYSICAL THERAPY | Age: 73
Setting detail: THERAPIES SERIES
Discharge: HOME | End: 2023-08-29
Payer: MEDICARE

## 2023-08-29 PROCEDURE — 97110 THERAPEUTIC EXERCISES: CPT

## 2023-08-29 PROCEDURE — 97112 NEUROMUSCULAR REEDUCATION: CPT

## 2023-08-29 NOTE — FLOWSHEET NOTE
Outpatient Physical Therapy  [place]           [] Phone: [phone]   Fax: [phone]  [facility]           [x] Phone: [phone]   Fax: [phone]        Physical Therapy Daily Treatment Note  Date:  2023    Patient Name:  Magdi Vitale    :  1950  MRN: [id number]  Restrictions/Precautions: fall risk     Diagnosis:   Chronic midline low back pain without sciatica [M54.50, G89.29]  Chronic bilateral thoracic back pain [M54.6, G89.29]    Date of Injury/Surgery: acute on chronic  Treatment Diagnosis:   M62.81 muscle weakness, R26.89 abnormality of gait  Insurance/Certification information:  [facility] Medicare  Referring Physician:  Angelina Dumont PA-C     PCP: Angelina Dumont PA-C  Plan of care signed (Y/N):  adam faxed  Outcome Measure: Oswestry: 50 = 42% disability   2MWT: 298.0 feet  Visit# / total visits:  4/10  Pain level: 4/10 left low back  Goals:     Patient goals:   pt reports she wants to be able to move and walk without all the pain and to strengthen back. Short term goals to be achieved by September 10, 2023:  Short term goal 1: Pt will report compliance with current HEP as prescribed in order to improve ROM and strength. Short term goal 2: Pt will demonstrate L hip flexion strength at least 4/5 in order to improve strength. Short term goal 3: Pt will demonstrate the ability to safely complete at least 325 feet on the 2 minute walk test in order to improve functional mobility. Short term goal 4: Pt will demonstrate an Oswestry score of no more than 35% disability in order to improve quality of life. Subjective:  Patient states that she is just having a slight bit of pain rated 4/10. Any changes in Ambulatory Summary Sheet?   None    Objective:  Verbal cues provided for proper form with clamshells    Exercises: (No more than 4 columns)   Exercise/Equipment Date: 23 Date 8/10/2023 Date 23            WARM UP        NuStep    next S4/A8 Lv3 5'

## 2023-08-31 ENCOUNTER — HOSPITAL ENCOUNTER (OUTPATIENT)
Dept: PHYSICAL THERAPY | Age: 73
Setting detail: THERAPIES SERIES
Discharge: HOME | End: 2023-08-31
Payer: MEDICARE

## 2023-08-31 PROCEDURE — 97110 THERAPEUTIC EXERCISES: CPT

## 2023-08-31 PROCEDURE — 97112 NEUROMUSCULAR REEDUCATION: CPT

## 2023-08-31 NOTE — FLOWSHEET NOTE
Outpatient Physical Therapy  [place]           [] Phone: [phone]   Fax: [phone]  [facility]           [x] Phone: [phone]   Fax: [phone]        Physical Therapy Daily Treatment Note  Date:  2023    Patient Name:  Celeste Thakur    :  1950  MRN: [id number]  Restrictions/Precautions: fall risk     Diagnosis:   Chronic midline low back pain without sciatica [M54.50, G89.29]  Chronic bilateral thoracic back pain [M54.6, G89.29]    Date of Injury/Surgery: acute on chronic  Treatment Diagnosis:   M62.81 muscle weakness, R26.89 abnormality of gait  Insurance/Certification information:  [employer] Medicare  Referring Physician:  Taya Pineda PA-C     PCP: Taya Pineda PA-C  Plan of care signed (Y/N):  adam faxed  Outcome Measure: Oswestry:  = 42% disability   2MWT: 298.0 feet  Visit# / total visits:  5/10  Pain level: 5/10 left low back  Goals:     Patient goals:   pt reports she wants to be able to move and walk without all the pain and to strengthen back. Short term goals to be achieved by September 10, 2023:  Short term goal 1: Pt will report compliance with current HEP as prescribed in order to improve ROM and strength. Short term goal 2: Pt will demonstrate L hip flexion strength at least 4/5 in order to improve strength. Short term goal 3: Pt will demonstrate the ability to safely complete at least 325 feet on the 2 minute walk test in order to improve functional mobility. Short term goal 4: Pt will demonstrate an Oswestry score of no more than 35% disability in order to improve quality of life. Subjective:  Patient rates her pain 5/10 in the left LB today. Was trying to push something into the [facility] yesterday and hurt her knee. Any changes in Ambulatory Summary Sheet? None    Objective:  Fair core control noted with exercises.       Exercises: (No more than 4 columns)   Exercise/Equipment Date: 23 Date 8/10/2023 Date 23

## 2023-09-05 ENCOUNTER — HOSPITAL ENCOUNTER (OUTPATIENT)
Dept: PHYSICAL THERAPY | Age: 73
Setting detail: THERAPIES SERIES
Discharge: HOME | End: 2023-09-05
Payer: MEDICARE

## 2023-09-05 PROCEDURE — 97110 THERAPEUTIC EXERCISES: CPT

## 2023-09-05 PROCEDURE — 97112 NEUROMUSCULAR REEDUCATION: CPT

## 2023-09-05 NOTE — FLOWSHEET NOTE
NuStep   S4/A8 Lv3 5' S4/A8 Lv1 5' S4/A8 Lv1 5'         TABLE                                       STANDING      Hooklying Glute set Supine 10x5\" Supine 12x5\" Supine 10x5\"   bridges 10x 10x 10x   Hooklying TA contraction Supine 10x5\" Supine 12x5\" Supine 10x5\"   Hooklying TA contraction UE 10x (small ball in UE) 2x10 with small ball 2x10 with small ball   TA contraction LE drop out 10x 10x 20x   TA contraction LE marching 10x 10x 20x   Clamshells #1/2 #1 10x R/L #1 10x R/L #1 x 20  #2 x 10    4 way ankle w/ band YTB 10x ea way YTB 10x ea way Held today     Dead bug with small ball      10x same arm/same leg   TA contraction with SLR   10x B   PROPRIOCEPTION                                    MODALITIES                    Other Therapeutic Activities/Education:  Pt educated on PT findings, plan, prognosis, and HEP. A handout was provided and PT provided explanation for each exercise as well as frequency. Home Exercise Program:  8/7 - TA contraction, glute set  Added on 8/10/2023 -- TA contraction fall outs; TA contraction LE marches; TA contraction UE overhead reaches    Manual Treatments:  none    Modalities:  none    Communication with other providers:  eval faxed    Assessment:  (Response towards treatment session) (Pain Rating) Patient noted some soreness from the new exercises. Held ankle exercises today due to knee pain. Will continue to monitor and progress as able.       Plan for Next Session: Continue per POC     Time In / Time Out:   1035/1102    Timed Code/Total Treatment Minutes:   27 1te 1nr    Next Progress Note due:  9/10/23    Plan of Care Interventions:  [x] Therapeutic Exercise  [] Modalities:  [x] Therapeutic Activity     [] Ultrasound  [] Estim  [x] Gait Training      [] Cervical Traction [] Lumbar Traction  [x] Neuromuscular Re-education    [] Cold/hotpack [] Iontophoresis   [x] Instruction in HEP      [] Vasopneumatic   [] Dry Needling    [] Manual Therapy               [] Aquatic Therapy

## 2023-09-07 ENCOUNTER — HOSPITAL ENCOUNTER (OUTPATIENT)
Dept: PHYSICAL THERAPY | Age: 73
Discharge: HOME | End: 2023-09-07

## 2023-09-07 NOTE — FLOWSHEET NOTE
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     Comments:      Electronically signed by:  Melinda Harrell PT, DPT [id number] 2023, 10:44 AM

## 2023-09-12 ENCOUNTER — HOSPITAL ENCOUNTER (OUTPATIENT)
Dept: PHYSICAL THERAPY | Age: 73
Setting detail: THERAPIES SERIES
Discharge: HOME | End: 2023-09-12
Payer: MEDICARE

## 2023-09-12 PROCEDURE — 97112 NEUROMUSCULAR REEDUCATION: CPT

## 2023-09-12 PROCEDURE — 97110 THERAPEUTIC EXERCISES: CPT

## 2023-09-12 NOTE — FLOWSHEET NOTE
Outpatient Physical Therapy  [place]           [] Phone: [phone]   Fax: [phone]  [facility]           [x] Phone: [phone]   Fax: [phone]        Physical Therapy Daily Treatment Note  Date:  2023    Patient Name:  Abimbola Schwarz    :  1950  MRN: [id number]  Restrictions/Precautions: fall risk     Diagnosis:   Chronic midline low back pain without sciatica [M54.50, G89.29]  Chronic bilateral thoracic back pain [M54.6, G89.29]    Date of Injury/Surgery: acute on chronic  Treatment Diagnosis:   M62.81 muscle weakness, R26.89 abnormality of gait  Insurance/Certification information:  [facility] Medicare  Referring Physician:  Malcom Barreto PA-C     PCP: Malcom Barreto PA-C  Plan of care signed (Y/N):  adam faxed  Outcome Measure: Oswestry:  = 42% disability   2MWT: 298.0 feet  Visit# / total visits:  7/10  Pain level: 2/10 left low back  Goals:     Patient goals:   pt reports she wants to be able to move and walk without all the pain and to strengthen back. Short term goals to be achieved by September 10, 2023:  Short term goal 1: Pt will report compliance with current HEP as prescribed in order to improve ROM and strength. Short term goal 2: Pt will demonstrate L hip flexion strength at least 4/5 in order to improve strength. Short term goal 3: Pt will demonstrate the ability to safely complete at least 325 feet on the 2 minute walk test in order to improve functional mobility. Short term goal 4: Pt will demonstrate an Oswestry score of no more than 35% disability in order to improve quality of life. Subjective:  Pain remains in L LB. Rated 2/10 today. Any changes in Ambulatory Summary Sheet? None    Objective:  Verbal cues for proper form with exercises.      Exercises: (No more than 4 columns)   Exercise/Equipment 2023            WARM UP        NuStep   S4/A8 Lv3 5' S4/A8 Lv1 5' S4/A8 Lv1 5' S4/A8 Lv1 5'          TABLE

## 2023-09-14 ENCOUNTER — HOSPITAL ENCOUNTER (OUTPATIENT)
Dept: PHYSICAL THERAPY | Age: 73
Discharge: HOME | End: 2023-09-14

## 2023-09-14 NOTE — FLOWSHEET NOTE
Physical Therapy  Cancellation/No-show Note  Patient Name:  Abimbola Schwarz  :  1950   Date:  2023  Cancelled visits to date: 0  No-shows to date: 0    For today's appointment patient:  []  Cancelled  []  Rescheduled appointment  [x]  No-show     Reason given by patient:  []  Patient ill  []  Conflicting appointment  []  No transportation    []  Conflict with work  [x]  No reason given  []  Other:     Comments:      Electronically signed by:  Vita Guevara PT,DPT [id number]  2023, 10:55 AM

## 2023-09-19 ENCOUNTER — HOSPITAL ENCOUNTER (OUTPATIENT)
Dept: PHYSICAL THERAPY | Age: 73
Setting detail: THERAPIES SERIES
Discharge: HOME | End: 2023-09-19
Payer: MEDICARE

## 2023-09-19 PROCEDURE — 97110 THERAPEUTIC EXERCISES: CPT

## 2023-09-19 PROCEDURE — 97112 NEUROMUSCULAR REEDUCATION: CPT

## 2023-09-19 NOTE — FLOWSHEET NOTE
Outpatient Physical Therapy  [place]           [] Phone: [phone]   Fax: [phone]  [facility]           [x] Phone: [phone]   Fax: [phone]        Physical Therapy Daily Treatment Note  Date:  2023    Patient Name:  Yasemin Blood    :  1950  MRN: [id number]  Restrictions/Precautions: fall risk     Diagnosis:   Chronic midline low back pain without sciatica [M54.50, G89.29]  Chronic bilateral thoracic back pain [M54.6, G89.29]    Date of Injury/Surgery: acute on chronic  Treatment Diagnosis:   M62.81 muscle weakness, R26.89 abnormality of gait  Insurance/Certification information:  [facility] Medicare  Referring Physician:  Edmar Ceja PA-C     PCP: Edmar Ceja PA-C  Plan of care signed (Y/N):  adam faxed  Outcome Measure: Oswestry:  = 42% disability   2MWT: 298.0 feet  Visit# / total visits:  8/10  Pain level: 4/10 left low back  Goals:     Patient goals:   pt reports she wants to be able to move and walk without all the pain and to strengthen back. Short term goals to be achieved by September 10, 2023:  Short term goal 1: Pt will report compliance with current HEP as prescribed in order to improve ROM and strength. Short term goal 2: Pt will demonstrate L hip flexion strength at least 4/5 in order to improve strength. Short term goal 3: Pt will demonstrate the ability to safely complete at least 325 feet on the 2 minute walk test in order to improve functional mobility. Short term goal 4: Pt will demonstrate an Oswestry score of no more than 35% disability in order to improve quality of life. Subjective:  Patient rates her LBP 4/10 today. States that she is feeling better overall. Today is the first day that she has not worn the knee brace. Any changes in Ambulatory Summary Sheet? None    Objective:   Increased fatigue noted with sidelying hip abduction exercises    Exercises: (No more than 4 columns)   Exercise/Equipment 2023

## 2023-09-21 ENCOUNTER — HOSPITAL ENCOUNTER (OUTPATIENT)
Dept: PHYSICAL THERAPY | Age: 73
Setting detail: THERAPIES SERIES
Discharge: HOME | End: 2023-09-21
Payer: MEDICARE

## 2023-09-21 PROCEDURE — 97110 THERAPEUTIC EXERCISES: CPT

## 2023-09-21 NOTE — PROGRESS NOTES
[facility] Outpatient Physical Therapy  [place] [address]  Phone: [phone]  Fax: [phone]      Physician: Patti Thakkar PA-C        From: Dawn Pace, PT, DPT     Patient: Brett Lam                     : 1950  Diagnosis: Chronic midline low back pain without sciatica [M54.50, G89.29]  Chronic bilateral thoracic back pain [M54.6, G89.29]       Date: 2023  Treatment Diagnosis:  M62.81 muscle weakness, R26.89 abnormality of gait    [x]  Progress Note                []  Discharge Note    Total Visits to date:   9 Cancels/No-shows to date:  2    Subjective: Patient rates her LBP /10 today. She reports that she has noticed an improvement, but she continues to have good days and bad days. Plan of Care/Treatment to date:  [] Therapeutic Exercise    [] Modalities:  [] Therapeutic Activity     [] Ultrasound  [] Electric Stimulation  [] Gait Training      [] Cervical Traction    [] Lumbar Traction  [] Neuromuscular Re-education  [] Cold/hotpack [] Iontophoresis  [] Instruction in HEP      Other:  [] Manual Therapy       []  Vasopneumatic  [] Aquatic Therapy       []                          Objective/Significant Findings At Last Visit/Comments:    L hip flexion: 4/5  2MWt: 344.7  Oswestry: 16/50 = 32% disability      Assessment: Pt is a pleasant 67 yo female who would benefit from continuation of skilled PT to address back pain. Goal Status:  [x] Achieved [] Partially Achieved  [] Not Achieved   Short term goal 1: Pt will report compliance with current HEP as prescribed in order to improve ROM and strength. . - MET, discharge goal.   Short term goal 2: Pt will demonstrate L hip flexion strength at least 4/5 in order to improve strength. . - MET, discharge goal.   Short term goal 3: Pt will demonstrate the ability to safely complete at least 325 feet on the 2 minute walk test in order to improve functional mobility.  . - MET, discharge goal.   Short term

## 2023-09-21 NOTE — FLOWSHEET NOTE
Outpatient Physical Therapy  [place]           [] Phone: [phone]   Fax: [phone]  [facility]           [x] Phone: [phone]   Fax: [phone]        Physical Therapy Daily Treatment Note  Date:  2023    Patient Name:  Jagdeep Goss    :  1950  MRN: [id number]  Restrictions/Precautions: fall risk     Diagnosis:   Chronic midline low back pain without sciatica [M54.50, G89.29]  Chronic bilateral thoracic back pain [M54.6, G89.29]    Date of Injury/Surgery: acute on chronic  Treatment Diagnosis:   M62.81 muscle weakness, R26.89 abnormality of gait  Insurance/Certification information:  [facility] Medicare  Referring Physician:  Christa Winters PA-C     PCP: Christa Winters PA-C  Plan of care signed (Y/N):  adam faxed  Outcome Measure: Oswestry:  = 42% disability   2MWT: 298.0 feet  Visit# / total visits:  8/10  Pain level: 2/10 left low back  Goals:     Patient goals:   pt reports she wants to be able to move and walk without all the pain and to strengthen back. Short term goals to be achieved by September 10, 2023:  Short term goal 1: Pt will report compliance with current HEP as prescribed in order to improve ROM and strength. . - MET, discharge goal.   Short term goal 2: Pt will demonstrate L hip flexion strength at least 4/5 in order to improve strength. . - MET, discharge goal.   Short term goal 3: Pt will demonstrate the ability to safely complete at least 325 feet on the 2 minute walk test in order to improve functional mobility. . - MET, discharge goal.   Short term goal 4: Pt will demonstrate an Oswestry score of no more than 35% disability in order to improve quality of life. - MET, discharge goal.      Subjective:  Patient rates her LBP 2/10 today. Any changes in Ambulatory Summary Sheet?   None    Objective:  L hip flexion: 4/5  2MWt: 344.7  Oswestry: 50 = 32% disability     Exercises: (No more than 4 columns)   Exercise/Equipment 2023 Date:

## 2023-10-25 ENCOUNTER — TELEPHONE (OUTPATIENT)
Dept: PHARMACY | Facility: CLINIC | Age: 73
End: 2023-10-25

## 2023-10-26 ENCOUNTER — OFFICE VISIT (OUTPATIENT)
Dept: FAMILY MEDICINE CLINIC | Age: 73
End: 2023-10-26
Payer: MEDICARE

## 2023-10-26 VITALS
DIASTOLIC BLOOD PRESSURE: 60 MMHG | SYSTOLIC BLOOD PRESSURE: 100 MMHG | HEART RATE: 87 BPM | RESPIRATION RATE: 20 BRPM | WEIGHT: 128.2 LBS | BODY MASS INDEX: 25.89 KG/M2 | OXYGEN SATURATION: 93 %

## 2023-10-26 DIAGNOSIS — E27.8 LEFT ADRENAL MASS (HCC): Chronic | ICD-10-CM

## 2023-10-26 DIAGNOSIS — G89.29 CHRONIC MIDLINE LOW BACK PAIN WITHOUT SCIATICA: ICD-10-CM

## 2023-10-26 DIAGNOSIS — M17.12 PRIMARY OSTEOARTHRITIS OF LEFT KNEE: ICD-10-CM

## 2023-10-26 DIAGNOSIS — I71.43 INFRARENAL ABDOMINAL AORTIC ANEURYSM (AAA) WITHOUT RUPTURE (HCC): ICD-10-CM

## 2023-10-26 DIAGNOSIS — Z00.00 MEDICARE ANNUAL WELLNESS VISIT, SUBSEQUENT: Primary | ICD-10-CM

## 2023-10-26 DIAGNOSIS — M54.50 CHRONIC MIDLINE LOW BACK PAIN WITHOUT SCIATICA: ICD-10-CM

## 2023-10-26 PROCEDURE — 1123F ACP DISCUSS/DSCN MKR DOCD: CPT | Performed by: PHYSICIAN ASSISTANT

## 2023-10-26 PROCEDURE — G0439 PPPS, SUBSEQ VISIT: HCPCS | Performed by: PHYSICIAN ASSISTANT

## 2023-10-26 RX ORDER — TRAMADOL HYDROCHLORIDE 50 MG/1
50 TABLET ORAL EVERY 8 HOURS PRN
Qty: 90 TABLET | Refills: 2 | Status: SHIPPED | OUTPATIENT
Start: 2023-11-03 | End: 2024-02-01

## 2023-10-26 RX ORDER — PREDNISONE 20 MG/1
20 TABLET ORAL 2 TIMES DAILY
Qty: 10 TABLET | Refills: 1 | Status: SHIPPED | OUTPATIENT
Start: 2023-10-26

## 2023-10-26 ASSESSMENT — PATIENT HEALTH QUESTIONNAIRE - PHQ9
4. FEELING TIRED OR HAVING LITTLE ENERGY: 1
SUM OF ALL RESPONSES TO PHQ QUESTIONS 1-9: 4
6. FEELING BAD ABOUT YOURSELF - OR THAT YOU ARE A FAILURE OR HAVE LET YOURSELF OR YOUR FAMILY DOWN: 1
8. MOVING OR SPEAKING SO SLOWLY THAT OTHER PEOPLE COULD HAVE NOTICED. OR THE OPPOSITE, BEING SO FIGETY OR RESTLESS THAT YOU HAVE BEEN MOVING AROUND A LOT MORE THAN USUAL: 0
SUM OF ALL RESPONSES TO PHQ QUESTIONS 1-9: 4
2. FEELING DOWN, DEPRESSED OR HOPELESS: 0
9. THOUGHTS THAT YOU WOULD BE BETTER OFF DEAD, OR OF HURTING YOURSELF: 0
10. IF YOU CHECKED OFF ANY PROBLEMS, HOW DIFFICULT HAVE THESE PROBLEMS MADE IT FOR YOU TO DO YOUR WORK, TAKE CARE OF THINGS AT HOME, OR GET ALONG WITH OTHER PEOPLE: 0
3. TROUBLE FALLING OR STAYING ASLEEP: 1
1. LITTLE INTEREST OR PLEASURE IN DOING THINGS: 0
SUM OF ALL RESPONSES TO PHQ9 QUESTIONS 1 & 2: 0
SUM OF ALL RESPONSES TO PHQ QUESTIONS 1-9: 4
5. POOR APPETITE OR OVEREATING: 1
SUM OF ALL RESPONSES TO PHQ QUESTIONS 1-9: 4
7. TROUBLE CONCENTRATING ON THINGS, SUCH AS READING THE NEWSPAPER OR WATCHING TELEVISION: 0

## 2023-10-26 ASSESSMENT — LIFESTYLE VARIABLES
HOW MANY STANDARD DRINKS CONTAINING ALCOHOL DO YOU HAVE ON A TYPICAL DAY: PATIENT DOES NOT DRINK
HOW OFTEN DO YOU HAVE A DRINK CONTAINING ALCOHOL: NEVER

## 2023-10-30 NOTE — TELEPHONE ENCOUNTER
For Pharmacy Admin Tracking Only    Program: [facility] in place:  No  Recommendation Provided To: Provider: 2 via Note to Provider  Intervention Detail: Lab(s) Ordered and New Rx: 1, reason: Needs Additional Therapy  Intervention Accepted By: Provider: 0  Gap Closed?: No   Time Spent (min): 15
as \"list cleanup\"     Patient's LDL >130, ASCVD risk score 10.4%, age 37-78 years, no allergies/intolerances to statins noted, patient has abdominal aortic aneurysm, stable angina pectoris, history of facial droop. Previous provider initiated statin therapy for elevated cholesterol. Would consider updated labs    No Known Allergies    Lab Results   Component Value Date    CHOL 234 (H) 03/06/2018    TRIG 79 03/06/2018    HDL 62 (H) 11/12/2020    LDLCALC 155 (H) 11/12/2020     ALT   Date Value Ref Range Status   07/12/2023 6 (L) 10 - 40 U/L Final     AST   Date Value Ref Range Status   07/12/2023 11 (L) 15 - 37 IU/L Final       The 10-year ASCVD risk score (Crystal CHAUDHARY, et al., 2019) is: 10.4%    Values used to calculate the score:      Age: 68 years      Sex: Female      Is Non- : No      Diabetic: No      Tobacco smoker: No      Systolic Blood Pressure: 013 mmHg      Is BP treated: No      HDL Cholesterol: 62 mg/dL      Total Cholesterol: 241 mg/dL       PLAN  The following are interventions that have been identified:  [address] [place] [address] gap and history hyperlipidemia. Patient has upcoming appointment. Will recommend updated lipid panel and starting statin as provider finds appropriate.  Note above sent to PCP    Ritchie White, PharmD, [facility], [address] [facility], toll free: [phone], option 1

## 2023-11-02 ENCOUNTER — OFFICE VISIT (OUTPATIENT)
Dept: ORTHOPEDIC SURGERY | Age: 73
End: 2023-11-02

## 2023-11-02 VITALS
OXYGEN SATURATION: 92 % | WEIGHT: 128 LBS | RESPIRATION RATE: 14 BRPM | HEART RATE: 76 BPM | BODY MASS INDEX: 25.8 KG/M2 | HEIGHT: 59 IN

## 2023-11-02 DIAGNOSIS — M17.12 PRIMARY OSTEOARTHRITIS OF LEFT KNEE: Primary | ICD-10-CM

## 2023-11-02 RX ORDER — TRIAMCINOLONE ACETONIDE 40 MG/ML
40 INJECTION, SUSPENSION INTRA-ARTICULAR; INTRAMUSCULAR ONCE
Status: COMPLETED | OUTPATIENT
Start: 2023-11-02 | End: 2023-11-02

## 2023-11-02 RX ADMIN — TRIAMCINOLONE ACETONIDE 40 MG: 40 INJECTION, SUSPENSION INTRA-ARTICULAR; INTRAMUSCULAR at 14:11

## 2023-11-02 ASSESSMENT — ENCOUNTER SYMPTOMS
RESPIRATORY NEGATIVE: 1
GASTROINTESTINAL NEGATIVE: 1
EYES NEGATIVE: 1

## 2023-11-02 NOTE — PATIENT INSTRUCTIONS
Reddie brace given in office today  With a brace when ambulating and as needed. Continue weight-bearing as tolerated. Continue range of motion exercises as instructed. Ice and elevate as needed. Tylenol or Motrin for pain. Injection given into the left knee. Follow up as needed. We are committed to providing you the best care possible. If you receive a survey after visiting one of our offices, please take time to share your experience concerning your physician office visit. These surveys are confidential and no health information about you is shared. We are eager to improve for you and we are counting on your feedback to help make that happen.

## 2023-11-02 NOTE — PROGRESS NOTES
Review of Systems   Constitutional: Negative. HENT: Negative. Eyes: Negative. Respiratory: Negative. Cardiovascular: Negative. Gastrointestinal: Negative. Genitourinary: Negative. Musculoskeletal:  Positive for arthralgias and myalgias. Skin: Negative. Neurological: Negative. Psychiatric/Behavioral: Negative. HPI:  Adelso Gilliland is a 68 y.o. female who presents to the office today with recurrent left knee pain. She has been in this office before about 2 years ago with left knee pain from arthritis and now has gotten steadily worse over time. She states that its been painful for the last several months but she thought it would get better and it never did. Pain is mostly on the medial side of the knee but also radiating throughout the knee. Pain is worse with weightbearing and stairs. She rates her pain today at a 7/10. Pain is aching and throbbing in nature. Past Medical History:   Diagnosis Date    Abdominal aortic aneurysm (AAA) without rupture ([address]) 02/25/2021    Following with Dr. Grisel Reyes    Aneurysm of abdominal aorta [facility])     Anhedonia 10/31/2018    Asthma     Back ache     reason for taking tramadol    COPD (chronic obstructive pulmonary disease) ([address])     COPD exacerbation ([address]) 02/26/2018    Dizziness 11/18/2021    H/O echocardiogram 02/18/2020    EF 50-55%. Sclerotic, but non-stenotic aortic valve. Moderate AR. Mild TR, MR. History of nuclear stress test 02/18/2020    EF 75%. This is a normal study. History of nuclear stress test 08/30/2022    Normal study.     Needs flu shot 09/28/2018    Other chest pain 3/19/2021    Stable angina pectoris 07/08/2021    stable       Past Surgical History:   Procedure Laterality Date    APPENDECTOMY      HYSTERECTOMY (CERVIX STATUS UNKNOWN)      MIDDLE EAR SURGERY         Family History   Problem Relation Age of Onset    Asthma Mother     Diabetes Father     Asthma Sister     Stroke Sister     Cancer Brother

## 2023-11-02 NOTE — PROGRESS NOTES
Katlyn Solano is a 68y.o. y.o. year old female who complains of Left knee pain and giving out, locking, pain located medial side of the knee, popping sensation, stiffness, and swelling. Patient states that she has had a fall onto the left knee. Helps the most nothing, heat (hot bath). Helps the least prolonged walking and standing. Previous treatment last injection 05/28/2023. Pain scale  10-12/10.

## 2023-11-06 RX ORDER — UMECLIDINIUM BROMIDE AND VILANTEROL TRIFENATATE 62.5; 25 UG/1; UG/1
1 POWDER RESPIRATORY (INHALATION) DAILY
Qty: 1 EACH | Refills: 11 | Status: SHIPPED | OUTPATIENT
Start: 2023-11-06

## 2023-11-20 DIAGNOSIS — J44.9 MODERATE COPD (CHRONIC OBSTRUCTIVE PULMONARY DISEASE) (HCC): ICD-10-CM

## 2023-11-20 RX ORDER — PREDNISONE 20 MG/1
20 TABLET ORAL 2 TIMES DAILY
Qty: 10 TABLET | Refills: 0 | Status: SHIPPED | OUTPATIENT
Start: 2023-11-20

## 2023-11-20 RX ORDER — ALBUTEROL SULFATE 90 UG/1
2 AEROSOL, METERED RESPIRATORY (INHALATION) EVERY 6 HOURS PRN
Qty: 6.7 G | Refills: 5 | Status: SHIPPED | OUTPATIENT
Start: 2023-11-20

## 2023-11-25 PROBLEM — Z00.00 MEDICARE ANNUAL WELLNESS VISIT, SUBSEQUENT: Status: RESOLVED | Noted: 2022-03-10 | Resolved: 2023-11-25

## 2023-11-28 ENCOUNTER — TELEPHONE (OUTPATIENT)
Dept: ORTHOPEDIC SURGERY | Age: 73
End: 2023-11-28

## 2023-12-05 NOTE — TELEPHONE ENCOUNTER
Precert form completed and faxed on CoverMyMeds and sent to [facility] for Review.

## 2023-12-07 NOTE — TELEPHONE ENCOUNTER
I called [employer] [phone] and spoke with Mis CHARLES who infomed me that no Silvia [place] is needed for Ravi Castillo. [place] [phone]  L knee OA M17.12  Incident# [id number]  Buy and bill in office procedure.

## 2024-01-04 ENCOUNTER — PROCEDURE VISIT (OUTPATIENT)
Dept: ORTHOPEDIC SURGERY | Age: 74
End: 2024-01-04
Payer: MEDICARE

## 2024-01-04 VITALS
WEIGHT: 127 LBS | HEART RATE: 73 BPM | RESPIRATION RATE: 16 BRPM | BODY MASS INDEX: 25.6 KG/M2 | HEIGHT: 59 IN | OXYGEN SATURATION: 95 %

## 2024-01-04 DIAGNOSIS — M17.12 PRIMARY OSTEOARTHRITIS OF LEFT KNEE: Primary | ICD-10-CM

## 2024-01-04 PROCEDURE — 20610 DRAIN/INJ JOINT/BURSA W/O US: CPT | Performed by: PHYSICIAN ASSISTANT

## 2024-01-04 PROCEDURE — 99999 PR OFFICE/OUTPT VISIT,PROCEDURE ONLY: CPT | Performed by: PHYSICIAN ASSISTANT

## 2024-01-04 RX ORDER — HYDROCODONE BITARTRATE AND ACETAMINOPHEN 5; 325 MG/1; MG/1
1 TABLET ORAL EVERY 8 HOURS PRN
Qty: 21 TABLET | Refills: 0 | Status: SHIPPED | OUTPATIENT
Start: 2024-01-04 | End: 2024-01-11

## 2024-01-04 NOTE — PATIENT INSTRUCTIONS
Gelsyn 3 # 1  Rest both knees for 24-48 hours  Work on ROM and strengthening of knees and legs   May take NSAIDS or anti-inflammatories as needed  Weightbearing as tolerated  Follow up in one week for the 2nd injection    We are committed to providing you the best care possible.  If you receive a survey after visiting one of our offices, please take time to share your experience concerning your physician office visit.  These surveys are confidential and no health information about you is shared.  We are eager to improve for you and we are counting on your feedback to help make that happen.

## 2024-01-04 NOTE — PROGRESS NOTES
VISCO-SUPPLEMENTATION INJECTION (Number 1)    HISTORY OF PRESENT ILLNESS (HPI):   Lori Brown is a 73 y.o. year old female who is here for follow up for visco-supplementation injection number 1 for   1. Primary osteoarthritis of left knee    .    PAST HISTORY:   Unless otherwise specified in this note, the past history is reviewed today with the patient and is as follows-    No Known Allergies    Current Outpatient Medications   Medication Sig Dispense Refill    predniSONE (DELTASONE) 20 MG tablet TAKE 1 TABLET BY MOUTH 2 TIMES DAILY 10 tablet 0    albuterol sulfate HFA (PROVENTIL;VENTOLIN;PROAIR) 108 (90 Base) MCG/ACT inhaler INHALE 2 PUFFS INTO THE LUNGS EVERY 6 HOURS AS NEEDED FOR WHEEZING 6.7 g 5    ANORO ELLIPTA 62.5-25 MCG/ACT inhaler INHALE 1 PUFF INTO THE LUNGS DAILY 1 each 11    tiZANidine (ZANAFLEX) 4 MG tablet TAKE 1 TABLET BY MOUTH EVERY 8 HOURS AS NEEDED FOR MUSCLE SPASMS 90 tablet 2    traZODone (DESYREL) 100 MG tablet 1-2 tabs at bedtime 60 tablet 5    ipratropium-albuterol (DUONEB) 0.5-2.5 (3) MG/3ML SOLN nebulizer solution Take 3 mLs by nebulization 4 times daily 360 mL 0    traMADol (ULTRAM) 50 MG tablet Take 1 tablet by mouth every 8 hours as needed for Pain for up to 90 days. Do not fill until 11/3/2023 (Patient not taking: Reported on 11/2/2023) 90 tablet 2     No current facility-administered medications for this visit.       PHYSICAL EXAM:   Pulse 73   Resp 16   Ht 1.499 m (4' 11\")   Wt 57.6 kg (127 lb)   SpO2 95%   BMI 25.65 kg/m²     The left knee is examined:  Inspection:  Skin is intact.  No erythema.  Palpation:  No swelling or acute tenderness.  Neuro/Vascular/Motor:  Sensation to light touch intact.  Capillary refill brisk.  No focal motor deficits.    DIAGNOSIS:     1. Primary osteoarthritis of left knee        PROCEDURE:   Left Knee Aspiration / Injection Procedure:  Multiple treatment options were discussed.  Joint injection was recommended.  Details of the procedure,

## 2024-01-11 ENCOUNTER — PROCEDURE VISIT (OUTPATIENT)
Dept: ORTHOPEDIC SURGERY | Age: 74
End: 2024-01-11
Payer: MEDICARE

## 2024-01-11 VITALS
HEART RATE: 79 BPM | OXYGEN SATURATION: 98 % | RESPIRATION RATE: 16 BRPM | BODY MASS INDEX: 25.6 KG/M2 | HEIGHT: 59 IN | WEIGHT: 127 LBS

## 2024-01-11 DIAGNOSIS — M17.12 PRIMARY OSTEOARTHRITIS OF LEFT KNEE: Primary | ICD-10-CM

## 2024-01-11 PROCEDURE — 20610 DRAIN/INJ JOINT/BURSA W/O US: CPT | Performed by: PHYSICIAN ASSISTANT

## 2024-01-11 PROCEDURE — 99999 PR OFFICE/OUTPT VISIT,PROCEDURE ONLY: CPT | Performed by: PHYSICIAN ASSISTANT

## 2024-01-11 NOTE — PROGRESS NOTES
VISCO-SUPPLEMENTATION INJECTION (Number 2)    HISTORY OF PRESENT ILLNESS (HPI):   Lori Brown is a 73 y.o. year old female who is here for follow up for visco-supplementation injection number 2 for   1. Primary osteoarthritis of left knee    She states that she felt like she got a little bit of relief after the first injection but the last 2 days that her knee has been hurting and it feels like it is jumping out of place.    PAST HISTORY:   Unless otherwise specified in this note, the past history is reviewed today with the patient and is as follows-    No Known Allergies    Current Outpatient Medications   Medication Sig Dispense Refill    HYDROcodone-acetaminophen (NORCO) 5-325 MG per tablet Take 1 tablet by mouth every 8 hours as needed for Pain for up to 7 days. Intended supply: 7 days. Take lowest dose possible to manage pain Max Daily Amount: 3 tablets 21 tablet 0    predniSONE (DELTASONE) 20 MG tablet TAKE 1 TABLET BY MOUTH 2 TIMES DAILY 10 tablet 0    albuterol sulfate HFA (PROVENTIL;VENTOLIN;PROAIR) 108 (90 Base) MCG/ACT inhaler INHALE 2 PUFFS INTO THE LUNGS EVERY 6 HOURS AS NEEDED FOR WHEEZING 6.7 g 5    ANORO ELLIPTA 62.5-25 MCG/ACT inhaler INHALE 1 PUFF INTO THE LUNGS DAILY 1 each 11    traMADol (ULTRAM) 50 MG tablet Take 1 tablet by mouth every 8 hours as needed for Pain for up to 90 days. Do not fill until 11/3/2023 90 tablet 2    tiZANidine (ZANAFLEX) 4 MG tablet TAKE 1 TABLET BY MOUTH EVERY 8 HOURS AS NEEDED FOR MUSCLE SPASMS 90 tablet 2    traZODone (DESYREL) 100 MG tablet 1-2 tabs at bedtime 60 tablet 5    ipratropium-albuterol (DUONEB) 0.5-2.5 (3) MG/3ML SOLN nebulizer solution Take 3 mLs by nebulization 4 times daily 360 mL 0     No current facility-administered medications for this visit.       PHYSICAL EXAM:   There were no vitals taken for this visit.    The left knee is examined:  Inspection:  Skin is intact.  No erythema.  Palpation:  No swelling or acute

## 2024-01-11 NOTE — PATIENT INSTRUCTIONS
Cheryllyn 3 # 2  Rest both knees for 24-48 hours  Work on ROM and strengthening of knees and legs   May take NSAIDS or anti-inflammatories as needed  Weightbearing as tolerated  Follow up in one week for the 3rd injection    We are committed to providing you the best care possible.  If you receive a survey after visiting one of our offices, please take time to share your experience concerning your physician office visit.  These surveys are confidential and no health information about you is shared.  We are eager to improve for you and we are counting on your feedback to help make that happen.

## 2024-01-18 ENCOUNTER — PROCEDURE VISIT (OUTPATIENT)
Dept: ORTHOPEDIC SURGERY | Age: 74
End: 2024-01-18
Payer: MEDICARE

## 2024-01-18 VITALS
WEIGHT: 126 LBS | HEART RATE: 73 BPM | BODY MASS INDEX: 25.4 KG/M2 | RESPIRATION RATE: 14 BRPM | HEIGHT: 59 IN | OXYGEN SATURATION: 96 %

## 2024-01-18 DIAGNOSIS — M17.12 PRIMARY OSTEOARTHRITIS OF LEFT KNEE: Primary | ICD-10-CM

## 2024-01-18 PROCEDURE — 20610 DRAIN/INJ JOINT/BURSA W/O US: CPT | Performed by: PHYSICIAN ASSISTANT

## 2024-01-18 PROCEDURE — 99999 PR OFFICE/OUTPT VISIT,PROCEDURE ONLY: CPT | Performed by: PHYSICIAN ASSISTANT

## 2024-01-18 RX ORDER — HYDROCODONE BITARTRATE AND ACETAMINOPHEN 5; 325 MG/1; MG/1
1 TABLET ORAL EVERY 8 HOURS PRN
Qty: 9 TABLET | Refills: 0 | Status: SHIPPED | OUTPATIENT
Start: 2024-01-18 | End: 2024-01-21

## 2024-01-18 NOTE — PATIENT INSTRUCTIONS
Irving 3 # 3  Rest both knees for 24-48 hours  Work on ROM and strengthening of knees and legs   May take NSAIDS or anti-inflammatories as needed  Weightbearing as tolerated  Follow up as needed    We are committed to providing you the best care possible.  If you receive a survey after visiting one of our offices, please take time to share your experience concerning your physician office visit.  These surveys are confidential and no health information about you is shared.  We are eager to improve for you and we are counting on your feedback to help make that happen.

## 2024-01-18 NOTE — PROGRESS NOTES
VISCO-SUPPLEMENTATION INJECTION (Number 3)    HISTORY OF PRESENT ILLNESS (HPI):   Lori Brown is a 73 y.o. year old female who is here for follow up for visco-supplementation injection number 3 for   1. Primary osteoarthritis of left knee      PAST HISTORY:   Unless otherwise specified in this note, the past history is reviewed today with the patient and is as follows-    No Known Allergies    Current Outpatient Medications   Medication Sig Dispense Refill    predniSONE (DELTASONE) 20 MG tablet TAKE 1 TABLET BY MOUTH 2 TIMES DAILY 10 tablet 0    albuterol sulfate HFA (PROVENTIL;VENTOLIN;PROAIR) 108 (90 Base) MCG/ACT inhaler INHALE 2 PUFFS INTO THE LUNGS EVERY 6 HOURS AS NEEDED FOR WHEEZING 6.7 g 5    ANORO ELLIPTA 62.5-25 MCG/ACT inhaler INHALE 1 PUFF INTO THE LUNGS DAILY 1 each 11    traMADol (ULTRAM) 50 MG tablet Take 1 tablet by mouth every 8 hours as needed for Pain for up to 90 days. Do not fill until 11/3/2023 90 tablet 2    tiZANidine (ZANAFLEX) 4 MG tablet TAKE 1 TABLET BY MOUTH EVERY 8 HOURS AS NEEDED FOR MUSCLE SPASMS 90 tablet 2    traZODone (DESYREL) 100 MG tablet 1-2 tabs at bedtime 60 tablet 5    ipratropium-albuterol (DUONEB) 0.5-2.5 (3) MG/3ML SOLN nebulizer solution Take 3 mLs by nebulization 4 times daily 360 mL 0     No current facility-administered medications for this visit.       PHYSICAL EXAM:   Pulse 73   Resp 14   Ht 1.499 m (4' 11\")   Wt 57.2 kg (126 lb)   SpO2 96%   BMI 25.45 kg/m²     The left knee is examined:  Inspection:  Skin is intact.  No erythema.  Palpation:  No swelling or acute tenderness.  Neuro/Vascular/Motor:  Sensation to light touch intact.  Capillary refill brisk.  No focal motor deficits.    DIAGNOSIS:     1. Primary osteoarthritis of left knee        PROCEDURE:   Left Knee Aspiration / Injection Procedure:  Multiple treatment options were discussed.  Joint injection was recommended.  Details of the procedure, potential risks, and potential benefits were

## 2024-01-24 ENCOUNTER — ENROLLMENT (OUTPATIENT)
Dept: PHARMACY | Facility: CLINIC | Age: 74
End: 2024-01-24

## 2024-01-24 ENCOUNTER — TELEPHONE (OUTPATIENT)
Dept: PHARMACY | Facility: CLINIC | Age: 74
End: 2024-01-24

## 2024-01-24 NOTE — TELEPHONE ENCOUNTER
Jen Mosher PA-C     Note patient has upcoming appointment schedule with you 1/26/24.  Would recommend updated lipid panel.- last on file from 2020 elevated.  If appropriate, please consider adding rosuvastatin 5 mg daily (or other) to your patient's regimen for the following reason:   Patient has been identified as a statin use in persons with cardiovascular disease care gap per [employer] and not currently filling a moderate or high intensity statin.     Please let me know if any questions or if I can complete any further outreach.     Thank you,  Asha Melendez, PharmD, [facility] [place] Clinical Pharmacist  Department, toll free: [phone], option 1         ====================================================================    POPULATION HEALTH CLINICAL PHARMACY: STATIN THERAPY REVIEW  Identified statin use in persons with cardiovascular disease care gap [facility].      Last Visit: 10/26/23  Next Visit: 1/26/24    Future Appointments   Date Time Provider Department Center   1/26/2024  9:40 AM Jen Mosher PA-C [facility]   2/29/2024  2:20 PM Michael Whitman PA-C [facility]     ASSESSMENT of Statin in Persons with Cardiovascular Disease Care Gap    Lori Brown  has been identified as having a diagnosis for clinical ASCVD or event (e.g., inpatient hospitalization for MI, CABG, PCI or other revascularization procedures) in the measurement year and not currently filling a moderate or high intensity statin.   Patients included in this care gap are males age 21-75 and females age 40-75.     Currently Prescribed a statin: no  Per Reconcile Dispense History:     Dispensed Days Supply Quantity Provider Pharmacy    ATORVASTATIN CALCIUM 10 MG 11/13/2020 90 90 each LIZ BLEVINS [facility] -...   ATORVASTATIN TAB 40MG 03/08/2018 30 30 Device Stephanie Burgos, APRN - CNP [facility] -...     Per chart review: Atorvastatin 10 mg last ordered 11/13/2020 for 90 day supply with 1 refill. On

## 2024-01-26 ENCOUNTER — OFFICE VISIT (OUTPATIENT)
Dept: FAMILY MEDICINE CLINIC | Age: 74
End: 2024-01-26
Payer: MEDICARE

## 2024-01-26 VITALS
WEIGHT: 130.2 LBS | HEART RATE: 76 BPM | BODY MASS INDEX: 26.3 KG/M2 | DIASTOLIC BLOOD PRESSURE: 60 MMHG | SYSTOLIC BLOOD PRESSURE: 110 MMHG | RESPIRATION RATE: 20 BRPM | OXYGEN SATURATION: 94 %

## 2024-01-26 DIAGNOSIS — M54.50 CHRONIC MIDLINE LOW BACK PAIN WITHOUT SCIATICA: ICD-10-CM

## 2024-01-26 DIAGNOSIS — J44.9 MODERATE COPD (CHRONIC OBSTRUCTIVE PULMONARY DISEASE) (HCC): Primary | ICD-10-CM

## 2024-01-26 DIAGNOSIS — F51.01 PRIMARY INSOMNIA: ICD-10-CM

## 2024-01-26 DIAGNOSIS — G89.29 CHRONIC MIDLINE LOW BACK PAIN WITHOUT SCIATICA: ICD-10-CM

## 2024-01-26 PROCEDURE — 80305 DRUG TEST PRSMV DIR OPT OBS: CPT | Performed by: PHYSICIAN ASSISTANT

## 2024-01-26 PROCEDURE — 99214 OFFICE O/P EST MOD 30 MIN: CPT | Performed by: PHYSICIAN ASSISTANT

## 2024-01-26 PROCEDURE — 1123F ACP DISCUSS/DSCN MKR DOCD: CPT | Performed by: PHYSICIAN ASSISTANT

## 2024-01-26 RX ORDER — TRAMADOL HYDROCHLORIDE 50 MG/1
50 TABLET ORAL EVERY 8 HOURS PRN
Qty: 90 TABLET | Refills: 2 | Status: SHIPPED | OUTPATIENT
Start: 2024-02-14 | End: 2024-05-14

## 2024-01-26 RX ORDER — TRAZODONE HYDROCHLORIDE 100 MG/1
TABLET ORAL
Qty: 60 TABLET | Refills: 11 | Status: SHIPPED | OUTPATIENT
Start: 2024-01-26

## 2024-01-26 RX ORDER — UMECLIDINIUM BROMIDE AND VILANTEROL TRIFENATATE 62.5; 25 UG/1; UG/1
1 POWDER RESPIRATORY (INHALATION) DAILY
Qty: 1 EACH | Refills: 11 | Status: SHIPPED | OUTPATIENT
Start: 2024-01-26

## 2024-01-26 ASSESSMENT — PATIENT HEALTH QUESTIONNAIRE - PHQ9
5. POOR APPETITE OR OVEREATING: 0
7. TROUBLE CONCENTRATING ON THINGS, SUCH AS READING THE NEWSPAPER OR WATCHING TELEVISION: 0
2. FEELING DOWN, DEPRESSED OR HOPELESS: 0
SUM OF ALL RESPONSES TO PHQ QUESTIONS 1-9: 1
8. MOVING OR SPEAKING SO SLOWLY THAT OTHER PEOPLE COULD HAVE NOTICED. OR THE OPPOSITE, BEING SO FIGETY OR RESTLESS THAT YOU HAVE BEEN MOVING AROUND A LOT MORE THAN USUAL: 0
3. TROUBLE FALLING OR STAYING ASLEEP: 0
4. FEELING TIRED OR HAVING LITTLE ENERGY: 1
10. IF YOU CHECKED OFF ANY PROBLEMS, HOW DIFFICULT HAVE THESE PROBLEMS MADE IT FOR YOU TO DO YOUR WORK, TAKE CARE OF THINGS AT HOME, OR GET ALONG WITH OTHER PEOPLE: 0
SUM OF ALL RESPONSES TO PHQ QUESTIONS 1-9: 1
9. THOUGHTS THAT YOU WOULD BE BETTER OFF DEAD, OR OF HURTING YOURSELF: 0
6. FEELING BAD ABOUT YOURSELF - OR THAT YOU ARE A FAILURE OR HAVE LET YOURSELF OR YOUR FAMILY DOWN: 0
SUM OF ALL RESPONSES TO PHQ9 QUESTIONS 1 & 2: 0
1. LITTLE INTEREST OR PLEASURE IN DOING THINGS: 0

## 2024-01-26 ASSESSMENT — ENCOUNTER SYMPTOMS
BACK PAIN: 1
RESPIRATORY NEGATIVE: 1
GASTROINTESTINAL NEGATIVE: 1

## 2024-01-26 NOTE — PROGRESS NOTES
Abdominal aortic aneurysm (AAA) without rupture ([facility]) 2021    Following with Dr. Rosario    Aneurysm of abdominal aorta ([facility])     Anhedonia 10/31/2018    Asthma     Back ache     reason for taking tramadol    COPD (chronic obstructive pulmonary disease) ([facility])     COPD exacerbation ([facility]) 2018    Dizziness 2021    H/O echocardiogram 2020    EF 50-55%.  Sclerotic, but non-stenotic aortic valve. Moderate AR. Mild TR, MR.     History of nuclear stress test 2020    EF 75%. This is a normal study.    History of nuclear stress test 2022    Normal study.    Needs flu shot 2018    Other chest pain 3/19/2021    Stable angina pectoris 2021    stable       Past Surgical History:   Procedure Laterality Date    APPENDECTOMY      HYSTERECTOMY (CERVIX STATUS UNKNOWN)      MIDDLE EAR SURGERY         Social History     Socioeconomic History    Marital status: Single     Spouse name: None    Number of children: None    Years of education: None    Highest education level: None   Tobacco Use    Smoking status: Former     Current packs/day: 0.00     Average packs/day: 1 pack/day for 30.0 years (30.0 ttl pk-yrs)     Types: Cigarettes     Start date: 1985     Quit date: 2015     Years since quittin.0    Smokeless tobacco: Never   Vaping Use    Vaping Use: Never used   Substance and Sexual Activity    Alcohol use: No    Drug use: No    Sexual activity: Never     Social Determinants of Health     Financial Resource Strain: Low Risk  (2023)    Overall Financial Resource Strain (CARDIA)     Difficulty of Paying Living Expenses: Not hard at all   Transportation Needs: Unknown (2023)    PRAPARE - Transportation     Lack of Transportation (Non-Medical): No   Physical Activity: Inactive (10/26/2023)    Exercise Vital Sign     Days of Exercise per Week: 0 days     Minutes of Exercise per Session: 0 min   Housing Stability: Unknown (2023)    Housing Stability Vital Sign

## 2024-01-26 NOTE — PATIENT INSTRUCTIONS
We are committed to providing you the best care possible.    If you receive a survey after visiting one of our offices, please take time to share your experience concerning your physician office visit.  These surveys are confidential and no health information about you is shared.    We are eager to improve for you and continue to give you satisfactory care, we are counting on your feedback to help make that happen.            Welcome to [facility]:    Did you know we now have a faster way for you to move through your appointment? For your convenience, we now have digital registration available. When you schedule your next appointment, you will receive a link via your email as well as a text message that will allow you to complete any paperwork digitally before your appointment. We are committed to providing you the best care possible.    If you receive a survey after visiting one of our offices, please take time to share your experience concerning your physician office visit.  These surveys are confidential and no health information about you is shared.    We are eager to improve for you and continue to give you satisfactory care, we are counting on your feedback to help make that happen.

## 2024-01-29 NOTE — TELEPHONE ENCOUNTER
Note message \"doned.\"     For Pharmacy Admin Tracking Only  Program: [email or web address]  CPA in place:  No  Recommendation Provided To: Provider: 1 via Note to Provider  Intervention Detail: New Rx: 1, reason: Needs Additional Therapy  Intervention Accepted By: Provider: 0  Gap Closed?: No   Time Spent (min): 15

## 2024-02-13 ENCOUNTER — TELEPHONE (OUTPATIENT)
Dept: FAMILY MEDICINE CLINIC | Age: 74
End: 2024-02-13

## 2024-02-13 NOTE — TELEPHONE ENCOUNTER
Pt called stating she has been experiencing some bleeding from her right ear as well as popping in that ear when she eats or talks. Pt does states she is having small amounts of ear pain as well. I recommended patient be evaluated for this at the [facility] today due to lack of availability in office. Pt voiced understanding, will call to schedule. No further action required.

## 2024-03-07 ENCOUNTER — OFFICE VISIT (OUTPATIENT)
Dept: ORTHOPEDIC SURGERY | Age: 74
End: 2024-03-07
Payer: MEDICARE

## 2024-03-07 VITALS
HEIGHT: 59 IN | OXYGEN SATURATION: 93 % | HEART RATE: 78 BPM | TEMPERATURE: 97.7 F | WEIGHT: 128 LBS | BODY MASS INDEX: 25.8 KG/M2 | RESPIRATION RATE: 16 BRPM

## 2024-03-07 DIAGNOSIS — M17.12 PRIMARY OSTEOARTHRITIS OF LEFT KNEE: Primary | ICD-10-CM

## 2024-03-07 PROCEDURE — 1123F ACP DISCUSS/DSCN MKR DOCD: CPT | Performed by: PHYSICIAN ASSISTANT

## 2024-03-07 PROCEDURE — 99213 OFFICE O/P EST LOW 20 MIN: CPT | Performed by: PHYSICIAN ASSISTANT

## 2024-03-07 RX ORDER — HYDROCODONE BITARTRATE AND ACETAMINOPHEN 5; 325 MG/1; MG/1
1 TABLET ORAL 2 TIMES DAILY PRN
Qty: 14 TABLET | Refills: 0 | Status: SHIPPED | OUTPATIENT
Start: 2024-03-07 | End: 2024-03-14

## 2024-03-07 ASSESSMENT — ENCOUNTER SYMPTOMS
EYES NEGATIVE: 1
RESPIRATORY NEGATIVE: 1
GASTROINTESTINAL NEGATIVE: 1

## 2024-03-07 NOTE — PATIENT INSTRUCTIONS
Continue range of motion exercises as instructed.  Ice and elevate as needed.  Tylenol or Motrin for pain.  Appt set up with Dr Malik to discuss knee replacement

## 2024-03-07 NOTE — PROGRESS NOTES
Review of Systems   Constitutional: Negative.    HENT: Negative.     Eyes: Negative.    Respiratory: Negative.     Cardiovascular: Negative.    Gastrointestinal: Negative.    Genitourinary: Negative.    Musculoskeletal:  Positive for arthralgias and myalgias.   Skin: Negative.    Neurological: Negative.    Psychiatric/Behavioral: Negative.           HPI:  Lori Brown is a 74 y.o. female who continues to have left knee pain that is not responding to steroid injection and most recently viscosupplementation injections.  The patient states that she is in significant pain and she rates it at a 7 or an 8/10.  She also has a large Baker's cyst in the posterior part of the left knee which is causing her discomfort.  She would like to know the next step to get out of pain.  She does have the past medical history of abdominal aortic aneurysm which right now is just being monitored and has not been recommended for surgery due to size not being big enough.  She also has some spondylolisthesis and lower lumbar scoliosis.    Past Medical History:   Diagnosis Date    Abdominal aortic aneurysm (AAA) without rupture ([facility]) 02/25/2021    Following with Dr. Rosario    Aneurysm of abdominal aorta ([facility])     AnhedLostant 10/31/2018    Asthma     Back ache     reason for taking tramadol    COPD (chronic obstructive pulmonary disease) ([facility])     COPD exacerbation ([facility]) 02/26/2018    Dizziness 11/18/2021    H/O echocardiogram 02/18/2020    EF 50-55%.  Sclerotic, but non-stenotic aortic valve. Moderate AR. Mild TR, MR.     History of nuclear stress test 02/18/2020    EF 75%. This is a normal study.    History of nuclear stress test 08/30/2022    Normal study.    Needs flu shot 09/28/2018    Other chest pain 3/19/2021    Stable angina pectoris 07/08/2021    stable       Past Surgical History:   Procedure Laterality Date    APPENDECTOMY      HYSTERECTOMY (CERVIX STATUS UNKNOWN)      MIDDLE EAR SURGERY         Family History   Problem Relation

## 2024-03-07 NOTE — PROGRESS NOTES
3/7/2024     2:30 PM   AMB PAIN ASSESSMENT   Location of Pain Knee   Location Modifiers Left   Severity of Pain 8   Quality of Pain Throbbing;Aching;Popping;Grinding;Cracking   Duration of Pain Persistent   Frequency of Pain Constant   Aggravating Factors Bending;Stretching;Straightening;Kneeling;Squatting   Limiting Behavior Yes   Relieving Factors Rest;Nsaids   Result of Injury No   Work-Related Injury No   Are there other pain locations you wish to document? No     Pt states the gel injections did not work it did for a little while but now she is hurting bad again and cannot sleep .

## 2024-03-11 ENCOUNTER — TELEPHONE (OUTPATIENT)
Dept: ORTHOPEDIC SURGERY | Age: 74
End: 2024-03-11

## 2024-03-11 NOTE — TELEPHONE ENCOUNTER
Patient called about pain medication. She was seen on 3/7/24 for Left Knee OA and is seeing Dr. Malik to discuss TKA on 3/20/24.    You tried Tramadol, this did not help. You gave her Norco and this is making her nauseous. OTC medications not helpful. She has not taken Zofran with Norco.     Please advise

## 2024-03-20 ENCOUNTER — OFFICE VISIT (OUTPATIENT)
Dept: ORTHOPEDIC SURGERY | Age: 74
End: 2024-03-20
Payer: MEDICARE

## 2024-03-20 VITALS
OXYGEN SATURATION: 94 % | HEART RATE: 78 BPM | BODY MASS INDEX: 26.21 KG/M2 | WEIGHT: 130 LBS | RESPIRATION RATE: 12 BRPM | HEIGHT: 59 IN

## 2024-03-20 DIAGNOSIS — M17.12 PRIMARY OSTEOARTHRITIS OF LEFT KNEE: Primary | ICD-10-CM

## 2024-03-20 PROCEDURE — 99204 OFFICE O/P NEW MOD 45 MIN: CPT | Performed by: ORTHOPAEDIC SURGERY

## 2024-03-20 PROCEDURE — 1123F ACP DISCUSS/DSCN MKR DOCD: CPT | Performed by: ORTHOPAEDIC SURGERY

## 2024-03-20 ASSESSMENT — ENCOUNTER SYMPTOMS
ABDOMINAL PAIN: 0
BACK PAIN: 0
COLOR CHANGE: 0
CHEST TIGHTNESS: 0

## 2024-03-20 NOTE — PROGRESS NOTES
Subjective:      Patient ID: Lori Brown is a 74 y.o. female.    Patient is here today to discuss TKA.    She comes in today for her first visit with me in regards to her left knee pain.  She states that she has been dealing with worsening deep, aching and grinding pain globally in her left knee for many years.  She states that over the past 6 months the pain is gone to the point that she is having difficulty with her daily activities.  She has had several different types of injections without any improvement in her symptoms.  Patient denies any new injury to the involved extremity/ joint, denies numbness or tingling in the involved extremity and denies fever or chills.          Review of Systems   Constitutional:  Negative for activity change, chills and fever.   HENT:  Negative for congestion and drooling.    Eyes:  Negative for redness.   Respiratory:  Negative for chest tightness.    Cardiovascular:  Negative for chest pain.   Gastrointestinal:  Negative for abdominal pain.   Endocrine: Negative for cold intolerance and heat intolerance.   Musculoskeletal:  Positive for arthralgias, gait problem, joint swelling and myalgias. Negative for back pain.   Skin:  Negative for color change, pallor, rash and wound.   Neurological:  Negative for weakness and numbness.   Psychiatric/Behavioral:  Negative for confusion.        Past Medical History:   Diagnosis Date    Abdominal aortic aneurysm (AAA) without rupture ([facility]) 02/25/2021    Following with Dr. Rosario    Aneurysm of abdominal aorta ([facility])     Anhedonia 10/31/2018    Asthma     Back ache     reason for taking tramadol    COPD (chronic obstructive pulmonary disease) ([facility])     COPD exacerbation ([facility]) 02/26/2018    Dizziness 11/18/2021    H/O echocardiogram 02/18/2020    EF 50-55%.  Sclerotic, but non-stenotic aortic valve. Moderate AR. Mild TR, MR.     History of nuclear stress test 02/18/2020    EF 75%. This is a normal study.    History of nuclear stress test

## 2024-03-20 NOTE — PATIENT INSTRUCTIONS
We will schedule surgery at soonest convenience. If you have any questions regarding your surgery please call our office and ask to speak with Radha [phone]

## 2024-03-21 ENCOUNTER — TELEPHONE (OUTPATIENT)
Dept: ORTHOPEDIC SURGERY | Age: 74
End: 2024-03-21

## 2024-03-21 NOTE — TELEPHONE ENCOUNTER
Patient scheduled for    Left Total Knee Arthroplasty  Date: 4/23/24  Facility: [facility]  Surgeon: Nile Malik DO  Product: Randall    Surgery Request created/emailed 3/21/24  Pathway Orders scanned 3/21/24  PCP Clearance routed via Epic to Jen Mosher PA-C 3/21/24  Cardiac Clearance routed via Epic to Dr. Crane 3/21/24  Pulmon Clearance routed via Epic to Dr. Tracy 3/21/24    Pre Op Appt 3/20/24    [facility] Medicare Insurance  Contacted 3/21/24 via portal   Status: Approved CPT 00010 ICD M17.12/M25.562 for 23hr observation  Auth# [id number]  Date Span: 4/23/24-7/16/24    [facility] PAT 4/10/24, FLAVIO 4/15/24    Pt requesting home health care upon d/c from hospital.

## 2024-04-01 ENCOUNTER — TELEPHONE (OUTPATIENT)
Dept: ORTHOPEDIC SURGERY | Age: 74
End: 2024-04-01

## 2024-04-01 DIAGNOSIS — M17.12 PRIMARY OSTEOARTHRITIS OF LEFT KNEE: Primary | ICD-10-CM

## 2024-04-01 NOTE — TELEPHONE ENCOUNTER
Please drop the following in a note:     Lori Brown was evaluated today and a DME order was entered for a standard walker because she requires this to successfully complete daily living tasks of ambulating.  A standard walker is necessary due to the patient's impaired ambulation and mobility restrictions and she can ambulate only by using a walker instead of a lesser assistive device, such as a cane or crutch.  The need for this equipment was discussed with the patient and she understands and is in agreement.

## 2024-04-09 ENCOUNTER — TELEPHONE (OUTPATIENT)
Age: 74
End: 2024-04-09

## 2024-04-09 RX ORDER — NAPROXEN SODIUM 220 MG
220 TABLET ORAL 2 TIMES DAILY WITH MEALS
COMMUNITY

## 2024-04-09 RX ORDER — SODIUM CHLORIDE, SODIUM LACTATE, POTASSIUM CHLORIDE, CALCIUM CHLORIDE 600; 310; 30; 20 MG/100ML; MG/100ML; MG/100ML; MG/100ML
INJECTION, SOLUTION INTRAVENOUS CONTINUOUS
OUTPATIENT
Start: 2024-04-23

## 2024-04-09 NOTE — TELEPHONE ENCOUNTER
Called Duong Pharmacist at [facility].   Reviewed providers note with was ok to fill Tramadol.   Duong voices understanding.  No questions or concerns

## 2024-04-09 NOTE — TELEPHONE ENCOUNTER
Received call from Duong Pharmacist at [facility].   Patient wanting to fill Tramadol prescription but is past the 14 day  fill day (date was in Feb)    Pharmacy needs to know if ok to fill prescription since past 14 days .   Please advise

## 2024-04-10 ENCOUNTER — HOSPITAL ENCOUNTER (OUTPATIENT)
Age: 74
Discharge: HOME | End: 2024-04-10
Payer: MEDICARE

## 2024-04-10 ENCOUNTER — HOSPITAL ENCOUNTER (OUTPATIENT)
Age: 74
Discharge: HOME | End: 2024-04-12
Payer: MEDICARE

## 2024-04-10 ENCOUNTER — HOSPITAL ENCOUNTER (OUTPATIENT)
Dept: GENERAL RADIOLOGY | Age: 74
Discharge: HOME | End: 2024-04-10
Payer: MEDICARE

## 2024-04-10 ENCOUNTER — HOSPITAL ENCOUNTER (OUTPATIENT)
Dept: PREADMISSION TESTING | Age: 74
Discharge: HOME | End: 2024-04-10
Payer: MEDICARE

## 2024-04-10 VITALS
BODY MASS INDEX: 25.72 KG/M2 | HEIGHT: 59 IN | TEMPERATURE: 97 F | HEART RATE: 85 BPM | SYSTOLIC BLOOD PRESSURE: 126 MMHG | WEIGHT: 127.6 LBS | OXYGEN SATURATION: 94 % | DIASTOLIC BLOOD PRESSURE: 65 MMHG | RESPIRATION RATE: 16 BRPM

## 2024-04-10 DIAGNOSIS — Z01.818 PRE-OP TESTING: ICD-10-CM

## 2024-04-10 DIAGNOSIS — Z01.818 PRE-OP TESTING: Primary | ICD-10-CM

## 2024-04-10 LAB
ALBUMIN SERPL-MCNC: 4.4 GM/DL (ref 3.4–5)
ALP BLD-CCNC: 91 IU/L (ref 40–128)
ALT SERPL-CCNC: 6 U/L (ref 10–40)
ANION GAP SERPL CALCULATED.3IONS-SCNC: 11 MMOL/L (ref 7–16)
AST SERPL-CCNC: 10 IU/L (ref 15–37)
BACTERIA: ABNORMAL /HPF
BASOPHILS ABSOLUTE: 0 K/CU MM
BASOPHILS RELATIVE PERCENT: 0.4 % (ref 0–1)
BILIRUB SERPL-MCNC: 0.2 MG/DL (ref 0–1)
BILIRUBIN URINE: NEGATIVE MG/DL
BLOOD, URINE: NEGATIVE
BUN SERPL-MCNC: 9 MG/DL (ref 6–23)
CALCIUM SERPL-MCNC: 9.2 MG/DL (ref 8.3–10.6)
CHLORIDE BLD-SCNC: 103 MMOL/L (ref 99–110)
CLARITY: CLEAR
CO2: 24 MMOL/L (ref 21–32)
COLOR: YELLOW
CREAT SERPL-MCNC: 0.6 MG/DL (ref 0.6–1.1)
DIFFERENTIAL TYPE: ABNORMAL
EKG ATRIAL RATE: 78 BPM
EKG DIAGNOSIS: NORMAL
EKG P AXIS: 61 DEGREES
EKG P-R INTERVAL: 154 MS
EKG Q-T INTERVAL: 388 MS
EKG QRS DURATION: 78 MS
EKG QTC CALCULATION (BAZETT): 442 MS
EKG R AXIS: 42 DEGREES
EKG T AXIS: 50 DEGREES
EKG VENTRICULAR RATE: 78 BPM
EOSINOPHILS ABSOLUTE: 0.1 K/CU MM
EOSINOPHILS RELATIVE PERCENT: 1.8 % (ref 0–3)
ERYTHROCYTE SEDIMENTATION RATE: 2 MM/HR (ref 0–30)
GFR SERPL CREATININE-BSD FRML MDRD: >90 ML/MIN/1.73M2
GLUCOSE SERPL-MCNC: 81 MG/DL (ref 70–99)
GLUCOSE, URINE: NEGATIVE MG/DL
HCT VFR BLD CALC: 43 % (ref 37–47)
HEMOGLOBIN: 14.1 GM/DL (ref 12.5–16)
IMMATURE NEUTROPHIL %: 0.3 % (ref 0–0.43)
KETONES, URINE: NEGATIVE MG/DL
LEUKOCYTE ESTERASE, URINE: ABNORMAL
LYMPHOCYTES ABSOLUTE: 1.4 K/CU MM
LYMPHOCYTES RELATIVE PERCENT: 20.1 % (ref 24–44)
MCH RBC QN AUTO: 31.7 PG (ref 27–31)
MCHC RBC AUTO-ENTMCNC: 32.8 % (ref 32–36)
MCV RBC AUTO: 96.6 FL (ref 78–100)
MONOCYTES ABSOLUTE: 0.6 K/CU MM
MONOCYTES RELATIVE PERCENT: 8.3 % (ref 0–4)
NEUTROPHILS RELATIVE PERCENT: 69.1 % (ref 36–66)
NITRITE URINE, QUANTITATIVE: POSITIVE
NUCLEATED RBC %: 0 %
PDW BLD-RTO: 12.6 % (ref 11.7–14.9)
PH, URINE: 6 (ref 5–8)
PLATELET # BLD: 222 K/CU MM (ref 140–440)
PMV BLD AUTO: 9.4 FL (ref 7.5–11.1)
POTASSIUM SERPL-SCNC: 3.9 MMOL/L (ref 3.5–5.1)
PROTEIN UA: NEGATIVE MG/DL
RBC # BLD: 4.45 M/CU MM (ref 4.2–5.4)
RBC URINE: 0 /HPF (ref 0–6)
SEGMENTED NEUTROPHILS ABSOLUTE COUNT: 4.7 K/CU MM
SODIUM BLD-SCNC: 138 MMOL/L (ref 135–145)
SPECIFIC GRAVITY UA: <1.005 (ref 1–1.03)
SQUAMOUS EPITHELIAL: 1 /HPF
TOTAL IMMATURE NEUTOROPHIL: 0.02 K/CU MM
TOTAL NUCLEATED RBC: 0 K/CU MM
TOTAL PROTEIN: 6.7 GM/DL (ref 6.4–8.2)
TRICHOMONAS: ABNORMAL /HPF
UROBILINOGEN, URINE: 0.2 MG/DL (ref 0.2–1)
WBC # BLD: 6.9 K/CU MM (ref 4–10.5)
WBC UA: 1 /HPF (ref 0–5)

## 2024-04-10 PROCEDURE — 87086 URINE CULTURE/COLONY COUNT: CPT

## 2024-04-10 PROCEDURE — 81001 URINALYSIS AUTO W/SCOPE: CPT

## 2024-04-10 PROCEDURE — 93010 ELECTROCARDIOGRAM REPORT: CPT | Performed by: INTERNAL MEDICINE

## 2024-04-10 PROCEDURE — 80053 COMPREHEN METABOLIC PANEL: CPT

## 2024-04-10 PROCEDURE — 87186 SC STD MICRODIL/AGAR DIL: CPT

## 2024-04-10 PROCEDURE — 71046 X-RAY EXAM CHEST 2 VIEWS: CPT

## 2024-04-10 PROCEDURE — 87077 CULTURE AEROBIC IDENTIFY: CPT

## 2024-04-10 PROCEDURE — 36415 COLL VENOUS BLD VENIPUNCTURE: CPT

## 2024-04-10 PROCEDURE — 85025 COMPLETE CBC W/AUTO DIFF WBC: CPT

## 2024-04-10 PROCEDURE — 93005 ELECTROCARDIOGRAM TRACING: CPT | Performed by: ORTHOPAEDIC SURGERY

## 2024-04-10 PROCEDURE — 85652 RBC SED RATE AUTOMATED: CPT

## 2024-04-10 NOTE — PROGRESS NOTES
.Surgery @ [facility] on 4/23/24 you will be called 4/22/24 with times    NOTHING TO EAT OR DRINK AFTER MIDNIGHT DAY OF SURGERY    1. Enter thru the hospital main entrance on day of surgery, check in at the Information Desk. If you arrive prior to 6:00am, enter thru the ER entrance.    2. Follow the directions as prescribed by the doctor for your procedure and medications.         Morning of surgery take:  Take a breathing treatment, use your inhalers and bring with you         Stop vitamins, supplements and NSAIDS:  4/16/24    3. Check with your Doctor regarding stopping blood thinners and follow their instructions.    4. Do not smoke, vape or use chewing tobacco morning of surgery. Do not drink any alcoholic beverages 24 hours prior to surgery.       This includes NA Beer. No street drugs 7 days prior to surgery.    5. If you have dentures, contacts of glasses they will be removed before going to the OR; please bring a case.    6. Please bring picture ID, insurance card, paperwork from the doctor’s office (H & P, Consent, & card for implantable devices).    7. Take a shower with an antibacterial soap the night before surgery and the morning of surgery. Do not put anything on your skin      After your morning shower.    8. You will need a responsible adult to drive you home and check on you after surgery.

## 2024-04-11 ENCOUNTER — TELEPHONE (OUTPATIENT)
Age: 74
End: 2024-04-11

## 2024-04-11 NOTE — TELEPHONE ENCOUNTER
Pt. Was pre testing for procedure at [facility]. Upon urine testing pt. States she screened positive for UTI and was advised by the hospital to contact PCP to send in medication to the pharmacy.  Please Advise

## 2024-04-12 ENCOUNTER — OFFICE VISIT (OUTPATIENT)
Dept: CARDIOLOGY CLINIC | Age: 74
End: 2024-04-12
Payer: MEDICARE

## 2024-04-12 ENCOUNTER — TELEPHONE (OUTPATIENT)
Dept: ORTHOPEDIC SURGERY | Age: 74
End: 2024-04-12

## 2024-04-12 VITALS
WEIGHT: 129 LBS | SYSTOLIC BLOOD PRESSURE: 114 MMHG | DIASTOLIC BLOOD PRESSURE: 64 MMHG | HEART RATE: 83 BPM | OXYGEN SATURATION: 93 % | HEIGHT: 59 IN | BODY MASS INDEX: 26 KG/M2

## 2024-04-12 DIAGNOSIS — Z01.810 PREOP CARDIOVASCULAR EXAM: ICD-10-CM

## 2024-04-12 DIAGNOSIS — R07.9 CHEST PAIN, UNSPECIFIED TYPE: ICD-10-CM

## 2024-04-12 DIAGNOSIS — Z01.818 PRE-OP TESTING: Primary | ICD-10-CM

## 2024-04-12 DIAGNOSIS — I71.43 INFRARENAL ABDOMINAL AORTIC ANEURYSM (AAA) WITHOUT RUPTURE (HCC): ICD-10-CM

## 2024-04-12 LAB
CULTURE: ABNORMAL
CULTURE: ABNORMAL
Lab: ABNORMAL
SPECIMEN: ABNORMAL

## 2024-04-12 PROCEDURE — 99214 OFFICE O/P EST MOD 30 MIN: CPT | Performed by: INTERNAL MEDICINE

## 2024-04-12 PROCEDURE — 1123F ACP DISCUSS/DSCN MKR DOCD: CPT | Performed by: INTERNAL MEDICINE

## 2024-04-12 RX ORDER — CIPROFLOXACIN 250 MG/1
250 TABLET, FILM COATED ORAL 2 TIMES DAILY
Qty: 6 TABLET | Refills: 0 | Status: SHIPPED | OUTPATIENT
Start: 2024-04-12 | End: 2024-04-15

## 2024-04-12 NOTE — PATIENT INSTRUCTIONS
Please be informed that if you contact our office outside of normal business hours the physician on call cannot help with any scheduling or rescheduling issues, procedure instruction questions or any type of medication issue.    We advise you for any urgent/emergency that you go to the nearest emergency room!    PLEASE CALL OUR OFFICE DURING NORMAL BUSINESS HOURS    Monday - Friday   8 am to 5 pm    [place]: [phone]    [place]: [phone]    [place]:  [phone]  **It is YOUR responsibilty to bring medication bottles and/or updated medication list to EACH APPOINTMENT. This will allow us to better serve you and all your healthcare needs**  Thank you for allowing us to care for you today!   We want to ensure we can follow your treatment plan and we strive to give you the best outcomes and experience possible.   If you ever have a life threatening emergency and call 911 - for an ambulance (EMS)   Our providers can only care for you at:   [facility] or [facility].   Even if you have someone take you or you drive yourself we can only care for you in a [facility] facility. Our providers are not setup at the other healthcare locations!   We are committed to providing you the best care possible.    If you receive a survey after visiting one of our offices, please take time to share your experience concerning your physician office visit.  These surveys are confidential and no health information about you is shared.    We are eager to improve for you and we are counting on your feedback to help make that happen.

## 2024-04-12 NOTE — TELEPHONE ENCOUNTER
Spoke with pt. Advised pt. Of message from PCP. Pt. Verbalized understanding and will not take both medications together. No further action at this time.

## 2024-04-12 NOTE — PROGRESS NOTES
Abdominal aortic aneurysm (AAA) without rupture ([facility]) 2021    Following with Dr. Abraham Grant 10/31/2018    Asthma     Back ache     reason for taking tramadol    COPD (chronic obstructive pulmonary disease) ([facility])     COPD exacerbation ([facility]) 2018    Dizziness 2021    H/O echocardiogram 2020    EF 50-55%.  Sclerotic, but non-stenotic aortic valve. Moderate AR. Mild TR, MR.     History of nuclear stress test 2020    EF 75%. This is a normal study.    History of nuclear stress test 2022    Normal study.    Needs flu shot 2018    Other chest pain 2021    PONV (postoperative nausea and vomiting)     Stable angina pectoris ([facility]) 2021    stable     Past Surgical History:   Procedure Laterality Date    APPENDECTOMY      HYSTERECTOMY (CERVIX STATUS UNKNOWN)      MIDDLE EAR SURGERY      TONSILLECTOMY       Family History   Problem Relation Age of Onset    Asthma Mother     Diabetes Father     Asthma Sister     Stroke Sister     Cancer Brother     Cancer Maternal Grandmother     Heart Disease Maternal Grandmother     Colon Cancer Maternal Grandfather      Social History     Tobacco Use    Smoking status: Former     Current packs/day: 0.00     Average packs/day: 1 pack/day for 30.0 years (30.0 ttl pk-yrs)     Types: Cigarettes     Start date: 1985     Quit date: 2015     Years since quittin.3    Smokeless tobacco: Never    Tobacco comments:     Smokes when stressed   Substance Use Topics    Alcohol use: No      [unfilled]  Review of Systems:   Constitutional: No Fever or Weight Loss   Eyes: No Decreased Vision  ENT: No Headaches, Hearing Loss or Vertigo  Cardiovascular: No chest pain, dyspnea on exertion, palpitations or loss of consciousness  Respiratory: No cough or wheezing    Gastrointestinal: No abdominal pain, appetite loss, blood in stools, constipation, diarrhea or heartburn  Genitourinary: No dysuria, trouble voiding, or

## 2024-04-12 NOTE — PROGRESS NOTES
4/12/24 - Patient called stating she was told to call her PCP for an antibiotic due to her testing showing a UTI.  Patient stated the PCP would not prescribe antibiotic until they has confirmation of UTI. I called the office (Jen Mosher PA-C), they were waiting on urine culture, then antibiotics would be prescribed. I called the patient back and updated her. Lori verbalized understanding.

## 2024-04-12 NOTE — TELEPHONE ENCOUNTER
This nurse spoke with patient's PCP office and they advised Jen Mosher PA-C has sent ATB for UTI. Patient states she will pick this up today and start taking it. UTI & ATB education given, patient verbalized understanding.

## 2024-04-15 ENCOUNTER — NURSE ONLY (OUTPATIENT)
Dept: ORTHOPEDIC SURGERY | Age: 74
End: 2024-04-15

## 2024-04-15 ENCOUNTER — ANESTHESIA EVENT (OUTPATIENT)
Dept: OPERATING ROOM | Age: 74
End: 2024-04-15
Payer: MEDICARE

## 2024-04-15 NOTE — ANESTHESIA PRE PROCEDURE
Department of Anesthesiology  Preprocedure Note       Name:  Lori Brown   Age:  74 y.o.  :  1950                                          MRN:  [id number]         Date:  4/15/2024      Surgeon: Surgeon(s):  Nile Malik DO    Procedure: Procedure(s):  LEFT KNEE TOTAL ARTHROPLASTY    Medications prior to admission:   Prior to Admission medications    Medication Sig Start Date End Date Taking? Authorizing Provider   ciprofloxacin (CIPRO) 250 MG tablet Take 1 tablet by mouth 2 times daily for 3 days 4/12/24 4/15/24  Jen Mosher PA-C   naproxen sodium (ALEVE) 220 MG tablet Take 1 tablet by mouth 2 times daily (with meals)    Provider, MD Indra   umeclidinium-vilanterol (ANORO ELLIPTA) 62.5-25 MCG/ACT inhaler Inhale 1 puff into the lungs daily 24   Jen Mosher PA-C   traMADol (ULTRAM) 50 MG tablet Take 1 tablet by mouth every 8 hours as needed for Pain for up to 90 days. Do not fill until 2024  Jen Mosher PA-C   traZODone (DESYREL) 100 MG tablet 1-2 tabs at bedtime 24   Jen Mosher PA-C   albuterol sulfate HFA (PROVENTIL;VENTOLIN;PROAIR) 108 (90 Base) MCG/ACT inhaler INHALE 2 PUFFS INTO THE LUNGS EVERY 6 HOURS AS NEEDED FOR WHEEZING 23   Jen Mosher PA-C   tiZANidine (ZANAFLEX) 4 MG tablet TAKE 1 TABLET BY MOUTH EVERY 8 HOURS AS NEEDED FOR MUSCLE SPASMS 23   Jen Mosher PA-C   ipratropium-albuterol (DUONEB) 0.5-2.5 (3) MG/3ML SOLN nebulizer solution Take 3 mLs by nebulization 4 times daily  Patient taking differently: Take 3 mLs by nebulization 4 times daily as needed 21   Jen Mosher PA-C       Current medications:    No current facility-administered medications for this encounter.     Current Outpatient Medications   Medication Sig Dispense Refill   • ciprofloxacin (CIPRO) 250 MG tablet Take 1 tablet by mouth 2 times daily for 3 days 6 tablet 0   • naproxen sodium (ALEVE) 220 MG tablet Take 1 tablet by mouth 2 times daily (with

## 2024-04-22 NOTE — PROGRESS NOTES
4/22/24 - .Notified patient surgery @ [facility] on  4/23/24 @ 1015, arrival 0715. NPO status and morning medications reviewed. Understanding verbalized.

## 2024-04-23 ENCOUNTER — HOSPITAL ENCOUNTER (OUTPATIENT)
Age: 74
Discharge: HOME | End: 2024-04-25
Attending: ORTHOPAEDIC SURGERY | Admitting: ORTHOPAEDIC SURGERY
Payer: MEDICARE

## 2024-04-23 ENCOUNTER — APPOINTMENT (OUTPATIENT)
Dept: GENERAL RADIOLOGY | Age: 74
End: 2024-04-23
Attending: ORTHOPAEDIC SURGERY
Payer: MEDICARE

## 2024-04-23 ENCOUNTER — ANESTHESIA (OUTPATIENT)
Dept: OPERATING ROOM | Age: 74
End: 2024-04-23
Payer: MEDICARE

## 2024-04-23 DIAGNOSIS — Z96.652 HISTORY OF TOTAL LEFT KNEE REPLACEMENT: Primary | ICD-10-CM

## 2024-04-23 PROCEDURE — 76942 ECHO GUIDE FOR BIOPSY: CPT | Performed by: ANESTHESIOLOGY

## 2024-04-23 PROCEDURE — C1713 ANCHOR/SCREW BN/BN,TIS/BN: HCPCS | Performed by: ORTHOPAEDIC SURGERY

## 2024-04-23 PROCEDURE — 64447 NJX AA&/STRD FEMORAL NRV IMG: CPT | Performed by: ANESTHESIOLOGY

## 2024-04-23 PROCEDURE — 2580000003 HC RX 258: Performed by: NURSE ANESTHETIST, CERTIFIED REGISTERED

## 2024-04-23 PROCEDURE — 6370000000 HC RX 637 (ALT 250 FOR IP): Performed by: ORTHOPAEDIC SURGERY

## 2024-04-23 PROCEDURE — 7100000001 HC PACU RECOVERY - ADDTL 15 MIN: Performed by: ORTHOPAEDIC SURGERY

## 2024-04-23 PROCEDURE — 2580000003 HC RX 258: Performed by: ORTHOPAEDIC SURGERY

## 2024-04-23 PROCEDURE — 2500000003 HC RX 250 WO HCPCS: Performed by: ORTHOPAEDIC SURGERY

## 2024-04-23 PROCEDURE — 97530 THERAPEUTIC ACTIVITIES: CPT

## 2024-04-23 PROCEDURE — 6360000002 HC RX W HCPCS: Performed by: FAMILY MEDICINE

## 2024-04-23 PROCEDURE — 2500000003 HC RX 250 WO HCPCS: Performed by: NURSE ANESTHETIST, CERTIFIED REGISTERED

## 2024-04-23 PROCEDURE — 6370000000 HC RX 637 (ALT 250 FOR IP): Performed by: STUDENT IN AN ORGANIZED HEALTH CARE EDUCATION/TRAINING PROGRAM

## 2024-04-23 PROCEDURE — 27447 TOTAL KNEE ARTHROPLASTY: CPT | Performed by: ORTHOPAEDIC SURGERY

## 2024-04-23 PROCEDURE — 2580000003 HC RX 258: Performed by: ANESTHESIOLOGY

## 2024-04-23 PROCEDURE — 94640 AIRWAY INHALATION TREATMENT: CPT

## 2024-04-23 PROCEDURE — 6360000002 HC RX W HCPCS: Performed by: ORTHOPAEDIC SURGERY

## 2024-04-23 PROCEDURE — 7100000000 HC PACU RECOVERY - FIRST 15 MIN: Performed by: ORTHOPAEDIC SURGERY

## 2024-04-23 PROCEDURE — 3700000001 HC ADD 15 MINUTES (ANESTHESIA): Performed by: ORTHOPAEDIC SURGERY

## 2024-04-23 PROCEDURE — 6360000002 HC RX W HCPCS: Performed by: ANESTHESIOLOGY

## 2024-04-23 PROCEDURE — 2709999900 HC NON-CHARGEABLE SUPPLY: Performed by: ORTHOPAEDIC SURGERY

## 2024-04-23 PROCEDURE — 97116 GAIT TRAINING THERAPY: CPT

## 2024-04-23 PROCEDURE — 3600000005 HC SURGERY LEVEL 5 BASE: Performed by: ORTHOPAEDIC SURGERY

## 2024-04-23 PROCEDURE — 64999 UNLISTED PX NERVOUS SYSTEM: CPT | Performed by: ANESTHESIOLOGY

## 2024-04-23 PROCEDURE — 2720000010 HC SURG SUPPLY STERILE: Performed by: ORTHOPAEDIC SURGERY

## 2024-04-23 PROCEDURE — 27447 TOTAL KNEE ARTHROPLASTY: CPT | Performed by: PHYSICIAN ASSISTANT

## 2024-04-23 PROCEDURE — 73560 X-RAY EXAM OF KNEE 1 OR 2: CPT

## 2024-04-23 PROCEDURE — 3600000015 HC SURGERY LEVEL 5 ADDTL 15MIN: Performed by: ORTHOPAEDIC SURGERY

## 2024-04-23 PROCEDURE — 2700000000 HC OXYGEN THERAPY PER DAY

## 2024-04-23 PROCEDURE — C1776 JOINT DEVICE (IMPLANTABLE): HCPCS | Performed by: ORTHOPAEDIC SURGERY

## 2024-04-23 PROCEDURE — 6360000002 HC RX W HCPCS: Performed by: NURSE ANESTHETIST, CERTIFIED REGISTERED

## 2024-04-23 PROCEDURE — 97162 PT EVAL MOD COMPLEX 30 MIN: CPT

## 2024-04-23 PROCEDURE — 3700000000 HC ANESTHESIA ATTENDED CARE: Performed by: ORTHOPAEDIC SURGERY

## 2024-04-23 DEVICE — CEMENT BNE 40GM W/ GENT HI VISC RADPQ FOR REV SURG: Type: IMPLANTABLE DEVICE | Site: KNEE | Status: FUNCTIONAL

## 2024-04-23 DEVICE — HEADLESS TROCHAR PIN 75MM
Type: IMPLANTABLE DEVICE | Site: KNEE | Status: FUNCTIONAL
Brand: ZUK

## 2024-04-23 RX ORDER — ASPIRIN 325 MG
325 TABLET ORAL 2 TIMES DAILY
Status: DISCONTINUED | OUTPATIENT
Start: 2024-04-23 | End: 2024-04-26 | Stop reason: HOSPADM

## 2024-04-23 RX ORDER — SODIUM CHLORIDE 0.9 % (FLUSH) 0.9 %
5-40 SYRINGE (ML) INJECTION EVERY 12 HOURS SCHEDULED
Status: DISCONTINUED | OUTPATIENT
Start: 2024-04-23 | End: 2024-04-26 | Stop reason: HOSPADM

## 2024-04-23 RX ORDER — MIDAZOLAM HYDROCHLORIDE 1 MG/ML
INJECTION INTRAMUSCULAR; INTRAVENOUS
Status: COMPLETED
Start: 2024-04-23 | End: 2024-04-23

## 2024-04-23 RX ORDER — FENTANYL CITRATE 50 UG/ML
25 INJECTION, SOLUTION INTRAMUSCULAR; INTRAVENOUS EVERY 5 MIN PRN
Status: DISCONTINUED | OUTPATIENT
Start: 2024-04-23 | End: 2024-04-23 | Stop reason: HOSPADM

## 2024-04-23 RX ORDER — SODIUM CHLORIDE 9 MG/ML
INJECTION, SOLUTION INTRAVENOUS PRN
Status: DISCONTINUED | OUTPATIENT
Start: 2024-04-23 | End: 2024-04-23 | Stop reason: HOSPADM

## 2024-04-23 RX ORDER — PHENYLEPHRINE HCL IN 0.9% NACL 1 MG/10 ML
SYRINGE (ML) INTRAVENOUS PRN
Status: DISCONTINUED | OUTPATIENT
Start: 2024-04-23 | End: 2024-04-23 | Stop reason: SDUPTHER

## 2024-04-23 RX ORDER — TRAZODONE HYDROCHLORIDE 50 MG/1
100 TABLET ORAL NIGHTLY
Status: DISCONTINUED | OUTPATIENT
Start: 2024-04-23 | End: 2024-04-26 | Stop reason: HOSPADM

## 2024-04-23 RX ORDER — PROPOFOL 10 MG/ML
INJECTION, EMULSION INTRAVENOUS PRN
Status: DISCONTINUED | OUTPATIENT
Start: 2024-04-23 | End: 2024-04-23 | Stop reason: SDUPTHER

## 2024-04-23 RX ORDER — SODIUM CHLORIDE 9 MG/ML
INJECTION, SOLUTION INTRAVENOUS PRN
Status: DISCONTINUED | OUTPATIENT
Start: 2024-04-23 | End: 2024-04-26 | Stop reason: HOSPADM

## 2024-04-23 RX ORDER — SODIUM CHLORIDE, SODIUM LACTATE, POTASSIUM CHLORIDE, CALCIUM CHLORIDE 600; 310; 30; 20 MG/100ML; MG/100ML; MG/100ML; MG/100ML
INJECTION, SOLUTION INTRAVENOUS CONTINUOUS
Status: DISCONTINUED | OUTPATIENT
Start: 2024-04-23 | End: 2024-04-26 | Stop reason: HOSPADM

## 2024-04-23 RX ORDER — ACETAMINOPHEN 325 MG/1
650 TABLET ORAL EVERY 6 HOURS
Status: DISCONTINUED | OUTPATIENT
Start: 2024-04-23 | End: 2024-04-26 | Stop reason: HOSPADM

## 2024-04-23 RX ORDER — ONDANSETRON 2 MG/ML
INJECTION INTRAMUSCULAR; INTRAVENOUS PRN
Status: DISCONTINUED | OUTPATIENT
Start: 2024-04-23 | End: 2024-04-23 | Stop reason: SDUPTHER

## 2024-04-23 RX ORDER — SODIUM CHLORIDE, SODIUM LACTATE, POTASSIUM CHLORIDE, CALCIUM CHLORIDE 600; 310; 30; 20 MG/100ML; MG/100ML; MG/100ML; MG/100ML
INJECTION, SOLUTION INTRAVENOUS CONTINUOUS
Status: DISCONTINUED | OUTPATIENT
Start: 2024-04-23 | End: 2024-04-23 | Stop reason: HOSPADM

## 2024-04-23 RX ORDER — IPRATROPIUM BROMIDE AND ALBUTEROL SULFATE 2.5; .5 MG/3ML; MG/3ML
1 SOLUTION RESPIRATORY (INHALATION) 4 TIMES DAILY
Status: DISCONTINUED | OUTPATIENT
Start: 2024-04-23 | End: 2024-04-24

## 2024-04-23 RX ORDER — OXYCODONE HYDROCHLORIDE 10 MG/1
10 TABLET ORAL EVERY 4 HOURS PRN
Status: DISCONTINUED | OUTPATIENT
Start: 2024-04-23 | End: 2024-04-26 | Stop reason: HOSPADM

## 2024-04-23 RX ORDER — SODIUM CHLORIDE 0.9 % (FLUSH) 0.9 %
5-40 SYRINGE (ML) INJECTION PRN
Status: DISCONTINUED | OUTPATIENT
Start: 2024-04-23 | End: 2024-04-23 | Stop reason: HOSPADM

## 2024-04-23 RX ORDER — KETOROLAC TROMETHAMINE 30 MG/ML
15 INJECTION, SOLUTION INTRAMUSCULAR; INTRAVENOUS EVERY 6 HOURS
Status: DISCONTINUED | OUTPATIENT
Start: 2024-04-23 | End: 2024-04-26 | Stop reason: HOSPADM

## 2024-04-23 RX ORDER — DEXAMETHASONE SODIUM PHOSPHATE 4 MG/ML
INJECTION, SOLUTION INTRA-ARTICULAR; INTRALESIONAL; INTRAMUSCULAR; INTRAVENOUS; SOFT TISSUE
Status: COMPLETED | OUTPATIENT
Start: 2024-04-23 | End: 2024-04-23

## 2024-04-23 RX ORDER — PROCHLORPERAZINE EDISYLATE 5 MG/ML
10 INJECTION INTRAMUSCULAR; INTRAVENOUS ONCE
Status: COMPLETED | OUTPATIENT
Start: 2024-04-23 | End: 2024-04-23

## 2024-04-23 RX ORDER — DEXAMETHASONE SODIUM PHOSPHATE 4 MG/ML
INJECTION, SOLUTION INTRA-ARTICULAR; INTRALESIONAL; INTRAMUSCULAR; INTRAVENOUS; SOFT TISSUE
Status: COMPLETED
Start: 2024-04-23 | End: 2024-04-23

## 2024-04-23 RX ORDER — OXYCODONE HYDROCHLORIDE 5 MG/1
5 TABLET ORAL EVERY 4 HOURS PRN
Status: DISCONTINUED | OUTPATIENT
Start: 2024-04-23 | End: 2024-04-26 | Stop reason: HOSPADM

## 2024-04-23 RX ORDER — ROPIVACAINE HYDROCHLORIDE 5 MG/ML
INJECTION, SOLUTION EPIDURAL; INFILTRATION; PERINEURAL
Status: COMPLETED
Start: 2024-04-23 | End: 2024-04-23

## 2024-04-23 RX ORDER — CYCLOBENZAPRINE HCL 10 MG
10 TABLET ORAL EVERY 12 HOURS PRN
Status: DISCONTINUED | OUTPATIENT
Start: 2024-04-23 | End: 2024-04-26 | Stop reason: HOSPADM

## 2024-04-23 RX ORDER — ACETAMINOPHEN 500 MG
1000 TABLET ORAL ONCE
Status: COMPLETED | OUTPATIENT
Start: 2024-04-23 | End: 2024-04-23

## 2024-04-23 RX ORDER — NALOXONE HYDROCHLORIDE 0.4 MG/ML
INJECTION, SOLUTION INTRAMUSCULAR; INTRAVENOUS; SUBCUTANEOUS PRN
Status: DISCONTINUED | OUTPATIENT
Start: 2024-04-23 | End: 2024-04-23 | Stop reason: HOSPADM

## 2024-04-23 RX ORDER — PROMETHAZINE HYDROCHLORIDE 25 MG/1
12.5 TABLET ORAL EVERY 6 HOURS PRN
Status: DISCONTINUED | OUTPATIENT
Start: 2024-04-23 | End: 2024-04-26 | Stop reason: HOSPADM

## 2024-04-23 RX ORDER — KETAMINE HCL 50MG/ML(1)
SYRINGE (ML) INTRAVENOUS PRN
Status: DISCONTINUED | OUTPATIENT
Start: 2024-04-23 | End: 2024-04-23 | Stop reason: SDUPTHER

## 2024-04-23 RX ORDER — SODIUM CHLORIDE 0.9 % (FLUSH) 0.9 %
5-40 SYRINGE (ML) INJECTION EVERY 12 HOURS SCHEDULED
Status: DISCONTINUED | OUTPATIENT
Start: 2024-04-23 | End: 2024-04-23 | Stop reason: HOSPADM

## 2024-04-23 RX ORDER — TRANEXAMIC ACID 10 MG/ML
1000 INJECTION, SOLUTION INTRAVENOUS
Status: COMPLETED | OUTPATIENT
Start: 2024-04-23 | End: 2024-04-23

## 2024-04-23 RX ORDER — ALBUTEROL SULFATE 90 UG/1
2 AEROSOL, METERED RESPIRATORY (INHALATION) EVERY 6 HOURS PRN
Status: DISCONTINUED | OUTPATIENT
Start: 2024-04-23 | End: 2024-04-26 | Stop reason: HOSPADM

## 2024-04-23 RX ORDER — LIDOCAINE HYDROCHLORIDE 20 MG/ML
INJECTION, SOLUTION INTRAVENOUS PRN
Status: DISCONTINUED | OUTPATIENT
Start: 2024-04-23 | End: 2024-04-23 | Stop reason: SDUPTHER

## 2024-04-23 RX ORDER — MIDAZOLAM HYDROCHLORIDE 1 MG/ML
INJECTION INTRAMUSCULAR; INTRAVENOUS
Status: COMPLETED | OUTPATIENT
Start: 2024-04-23 | End: 2024-04-23

## 2024-04-23 RX ORDER — PROMETHAZINE HYDROCHLORIDE 25 MG/ML
6.25 INJECTION, SOLUTION INTRAMUSCULAR; INTRAVENOUS EVERY 6 HOURS PRN
Status: DISCONTINUED | OUTPATIENT
Start: 2024-04-23 | End: 2024-04-26 | Stop reason: HOSPADM

## 2024-04-23 RX ORDER — ONDANSETRON 2 MG/ML
4 INJECTION INTRAMUSCULAR; INTRAVENOUS EVERY 6 HOURS PRN
Status: DISCONTINUED | OUTPATIENT
Start: 2024-04-23 | End: 2024-04-26 | Stop reason: HOSPADM

## 2024-04-23 RX ORDER — ROPIVACAINE HYDROCHLORIDE 5 MG/ML
INJECTION, SOLUTION EPIDURAL; INFILTRATION; PERINEURAL
Status: COMPLETED | OUTPATIENT
Start: 2024-04-23 | End: 2024-04-23

## 2024-04-23 RX ORDER — ONDANSETRON 2 MG/ML
4 INJECTION INTRAMUSCULAR; INTRAVENOUS
Status: COMPLETED | OUTPATIENT
Start: 2024-04-23 | End: 2024-04-23

## 2024-04-23 RX ORDER — TIZANIDINE 4 MG/1
4 TABLET ORAL EVERY 8 HOURS PRN
Status: DISCONTINUED | OUTPATIENT
Start: 2024-04-23 | End: 2024-04-26 | Stop reason: HOSPADM

## 2024-04-23 RX ORDER — LIDOCAINE HYDROCHLORIDE 10 MG/ML
INJECTION, SOLUTION EPIDURAL; INFILTRATION; INTRACAUDAL; PERINEURAL
Status: DISPENSED
Start: 2024-04-23 | End: 2024-04-23

## 2024-04-23 RX ORDER — SODIUM CHLORIDE 0.9 % (FLUSH) 0.9 %
5-40 SYRINGE (ML) INJECTION PRN
Status: DISCONTINUED | OUTPATIENT
Start: 2024-04-23 | End: 2024-04-26 | Stop reason: HOSPADM

## 2024-04-23 RX ADMIN — Medication 100 MCG: at 11:45

## 2024-04-23 RX ADMIN — HYDROMORPHONE HYDROCHLORIDE 0.5 MG: 1 INJECTION, SOLUTION INTRAMUSCULAR; INTRAVENOUS; SUBCUTANEOUS at 13:25

## 2024-04-23 RX ADMIN — Medication 25 MG: at 11:28

## 2024-04-23 RX ADMIN — SODIUM CHLORIDE, POTASSIUM CHLORIDE, SODIUM LACTATE AND CALCIUM CHLORIDE: 600; 310; 30; 20 INJECTION, SOLUTION INTRAVENOUS at 15:03

## 2024-04-23 RX ADMIN — HYDROMORPHONE HYDROCHLORIDE 0.5 MG: 1 INJECTION, SOLUTION INTRAMUSCULAR; INTRAVENOUS; SUBCUTANEOUS at 19:08

## 2024-04-23 RX ADMIN — PROMETHAZINE HYDROCHLORIDE 6.25 MG: 25 INJECTION INTRAMUSCULAR; INTRAVENOUS at 19:51

## 2024-04-23 RX ADMIN — SODIUM CHLORIDE, PRESERVATIVE FREE 10 ML: 5 INJECTION INTRAVENOUS at 22:06

## 2024-04-23 RX ADMIN — WATER 2000 MG: 1 INJECTION INTRAMUSCULAR; INTRAVENOUS; SUBCUTANEOUS at 22:03

## 2024-04-23 RX ADMIN — Medication 100 MCG: at 11:58

## 2024-04-23 RX ADMIN — DEXAMETHASONE SODIUM PHOSPHATE 4 MG: 4 INJECTION, SOLUTION INTRAMUSCULAR; INTRAVENOUS at 10:15

## 2024-04-23 RX ADMIN — ACETAMINOPHEN 1000 MG: 500 TABLET ORAL at 07:38

## 2024-04-23 RX ADMIN — OXYCODONE HYDROCHLORIDE 10 MG: 10 TABLET ORAL at 14:53

## 2024-04-23 RX ADMIN — ONDANSETRON 4 MG: 2 INJECTION INTRAMUSCULAR; INTRAVENOUS at 14:54

## 2024-04-23 RX ADMIN — DEXAMETHASONE SODIUM PHOSPHATE 4 MG: 4 INJECTION, SOLUTION INTRA-ARTICULAR; INTRALESIONAL; INTRAMUSCULAR; INTRAVENOUS; SOFT TISSUE at 10:15

## 2024-04-23 RX ADMIN — ONDANSETRON 4 MG: 2 INJECTION INTRAMUSCULAR; INTRAVENOUS at 11:30

## 2024-04-23 RX ADMIN — KETOROLAC TROMETHAMINE 15 MG: 30 INJECTION, SOLUTION INTRAMUSCULAR; INTRAVENOUS at 14:54

## 2024-04-23 RX ADMIN — ROPIVACAINE HYDROCHLORIDE 20 ML: 5 INJECTION, SOLUTION EPIDURAL; INFILTRATION; PERINEURAL at 10:15

## 2024-04-23 RX ADMIN — KETOROLAC TROMETHAMINE 15 MG: 30 INJECTION, SOLUTION INTRAMUSCULAR; INTRAVENOUS at 22:04

## 2024-04-23 RX ADMIN — PROCHLORPERAZINE EDISYLATE 10 MG: 5 INJECTION INTRAMUSCULAR; INTRAVENOUS at 22:05

## 2024-04-23 RX ADMIN — Medication 25 MG: at 11:48

## 2024-04-23 RX ADMIN — TRANEXAMIC ACID 1000 MG: 10 INJECTION, SOLUTION INTRAVENOUS at 11:38

## 2024-04-23 RX ADMIN — LIDOCAINE HYDROCHLORIDE 50 MG: 20 INJECTION, SOLUTION INTRAVENOUS at 11:28

## 2024-04-23 RX ADMIN — MIDAZOLAM 2 MG: 1 INJECTION INTRAMUSCULAR; INTRAVENOUS at 10:15

## 2024-04-23 RX ADMIN — CEFAZOLIN 2000 MG: 2 INJECTION, POWDER, FOR SOLUTION INTRAMUSCULAR; INTRAVENOUS at 11:32

## 2024-04-23 RX ADMIN — IPRATROPIUM BROMIDE AND ALBUTEROL SULFATE 1 DOSE: 2.5; .5 SOLUTION RESPIRATORY (INHALATION) at 22:29

## 2024-04-23 RX ADMIN — ONDANSETRON 4 MG: 2 INJECTION INTRAMUSCULAR; INTRAVENOUS at 13:53

## 2024-04-23 RX ADMIN — Medication 100 MCG: at 11:37

## 2024-04-23 RX ADMIN — HYDROMORPHONE HYDROCHLORIDE 0.5 MG: 1 INJECTION, SOLUTION INTRAMUSCULAR; INTRAVENOUS; SUBCUTANEOUS at 16:08

## 2024-04-23 RX ADMIN — PROPOFOL 100 MG: 10 INJECTION, EMULSION INTRAVENOUS at 11:28

## 2024-04-23 RX ADMIN — CEFAZOLIN 2000 MG: 2 INJECTION, POWDER, FOR SOLUTION INTRAMUSCULAR; INTRAVENOUS at 11:30

## 2024-04-23 RX ADMIN — FENTANYL CITRATE 25 MCG: 50 INJECTION INTRAMUSCULAR; INTRAVENOUS at 13:33

## 2024-04-23 RX ADMIN — SODIUM CHLORIDE, PRESERVATIVE FREE 10 ML: 5 INJECTION INTRAVENOUS at 16:10

## 2024-04-23 RX ADMIN — PROPOFOL 50 MG: 10 INJECTION, EMULSION INTRAVENOUS at 11:48

## 2024-04-23 RX ADMIN — SODIUM CHLORIDE, POTASSIUM CHLORIDE, SODIUM LACTATE AND CALCIUM CHLORIDE: 600; 310; 30; 20 INJECTION, SOLUTION INTRAVENOUS at 07:37

## 2024-04-23 RX ADMIN — SODIUM CHLORIDE, POTASSIUM CHLORIDE, SODIUM LACTATE AND CALCIUM CHLORIDE: 600; 310; 30; 20 INJECTION, SOLUTION INTRAVENOUS at 12:10

## 2024-04-23 ASSESSMENT — PAIN DESCRIPTION - DESCRIPTORS
DESCRIPTORS: ACHING
DESCRIPTORS: ACHING;DISCOMFORT
DESCRIPTORS: ACHING;BURNING;DISCOMFORT
DESCRIPTORS: ACHING;BURNING;DISCOMFORT
DESCRIPTORS: THROBBING;DISCOMFORT
DESCRIPTORS: ACHING;BURNING;DISCOMFORT

## 2024-04-23 ASSESSMENT — PAIN DESCRIPTION - ONSET
ONSET: ON-GOING

## 2024-04-23 ASSESSMENT — PAIN - FUNCTIONAL ASSESSMENT
PAIN_FUNCTIONAL_ASSESSMENT: ACTIVITIES ARE NOT PREVENTED
PAIN_FUNCTIONAL_ASSESSMENT: PREVENTS OR INTERFERES SOME ACTIVE ACTIVITIES AND ADLS
PAIN_FUNCTIONAL_ASSESSMENT: 0-10
PAIN_FUNCTIONAL_ASSESSMENT: ACTIVITIES ARE NOT PREVENTED
PAIN_FUNCTIONAL_ASSESSMENT: PREVENTS OR INTERFERES SOME ACTIVE ACTIVITIES AND ADLS

## 2024-04-23 ASSESSMENT — PAIN DESCRIPTION - FREQUENCY
FREQUENCY: CONTINUOUS
FREQUENCY: CONTINUOUS

## 2024-04-23 ASSESSMENT — PAIN DESCRIPTION - LOCATION
LOCATION: KNEE

## 2024-04-23 ASSESSMENT — PAIN DESCRIPTION - ORIENTATION
ORIENTATION: LEFT

## 2024-04-23 ASSESSMENT — PAIN SCALES - GENERAL
PAINLEVEL_OUTOF10: 10
PAINLEVEL_OUTOF10: 4
PAINLEVEL_OUTOF10: 2
PAINLEVEL_OUTOF10: 7
PAINLEVEL_OUTOF10: 6
PAINLEVEL_OUTOF10: 8
PAINLEVEL_OUTOF10: 10
PAINLEVEL_OUTOF10: 8
PAINLEVEL_OUTOF10: 10
PAINLEVEL_OUTOF10: 6
PAINLEVEL_OUTOF10: 6

## 2024-04-23 ASSESSMENT — PAIN DESCRIPTION - PAIN TYPE
TYPE: SURGICAL PAIN

## 2024-04-23 ASSESSMENT — PAIN SCALES - WONG BAKER
WONGBAKER_NUMERICALRESPONSE: NO HURT
WONGBAKER_NUMERICALRESPONSE: NO HURT

## 2024-04-23 NOTE — ANESTHESIA POSTPROCEDURE EVALUATION
Department of Anesthesiology  Postprocedure Note    Patient: Lori Brown  MRN: [id number]  YOB: 1950  Date of evaluation: 4/23/2024    Procedure Summary       Date: 04/23/24 Room / Location: [address]    Anesthesia Start: 1114 Anesthesia Stop: 1314    Procedure: LEFT KNEE TOTAL ARTHROPLASTY (Left: Knee) Diagnosis:       Primary osteoarthritis of left knee      Chronic pain of left knee      (Primary osteoarthritis of left knee [M17.12])      (Chronic pain of left knee [M25.562, G89.29])    Surgeons: Nile Malik DO Responsible Provider: Jeremiah Menchaca MD    Anesthesia Type: MAC, regional, spinal ASA Status: 2            Anesthesia Type: No value filed.    Soheila Phase I: Soheila Score: 9    Soheila Phase II:      Anesthesia Post Evaluation    Patient location during evaluation: bedside  Patient participation: complete - patient participated  Level of consciousness: awake  Pain score: 0  Airway patency: patent  Nausea & Vomiting: no nausea and no vomiting  Cardiovascular status: hemodynamically stable  Respiratory status: acceptable  Hydration status: euvolemic  Pain management: adequate    No notable events documented.

## 2024-04-23 NOTE — ANESTHESIA PROCEDURE NOTES
Peripheral Block    Patient location during procedure: procedure area  Reason for block: post-op pain management and at surgeon's request  Start time: 4/23/2024 10:15 AM  End time: 4/23/2024 10:30 AM  Staffing  Performed: anesthesiologist   Anesthesiologist: Jeremiah Menchaca MD  Performed by: Jeremiah Menchaca MD  Authorized by: Jeremiah Menchaca MD    Preanesthetic Checklist  Completed: patient identified, IV checked, site marked, risks and benefits discussed, surgical/procedural consents, equipment checked, pre-op evaluation, timeout performed, anesthesia consent given, oxygen available, monitors applied/VS acknowledged, fire risk safety assessment completed and verbalized and blood product R/B/A discussed and consented  Peripheral Block   Patient position: supine  Prep: ChloraPrep  Provider prep: mask  Patient monitoring: cardiac monitor, continuous pulse ox, frequent blood pressure checks, IV access, oxygen and responsive to questions  Block type: Anterior knee  Laterality: left  Injection technique: single-shot  Guidance: ultrasound guided  Local infiltration: lidocaine  Local infiltration: lidocaine    Needle   Needle type: insulated echogenic nerve stimulator needle   Needle length: 3 cm.  Assessment   Injection assessment: negative aspiration for heme, no paresthesia on injection, local visualized surrounding nerve on ultrasound, no intravascular symptoms and low pressure verified by pressure monitor  Paresthesia pain: none  Slow fractionated injection: yes  Hemodynamics: stable  Outcomes: patient tolerated procedure well and uncomplicated    Additional Notes  Genicular blocks x 4 placed in left knee. Superomedial, Superiolateral, Nerve to the vastus intermedius and inferomedial nerves targeted. Inferolateral nerve NOT targeted  Medications Administered  ROPivacaine (NAROPIN) 0.25% in sodium chloride (Mixture components: ROPivacaine 0.5% Soln, 10 mL; sodium chloride 0.9 % Soln, 10 mL) -

## 2024-04-23 NOTE — PROGRESS NOTES
4 Eyes Skin Assessment     NAME:  Lori Brown  YOB: 1950  MEDICAL RECORD NUMBER:  [id number]    The patient is being assessed for  Post-Op Surgical    I agree that at least one RN has performed a thorough Head to Toe Skin Assessment on the patient. ALL assessment sites listed below have been assessed.      Areas assessed by both nurses:    Head, Face, Ears, Shoulders, Back, Chest, Arms, Elbows, Hands, Sacrum. Buttock, Coccyx, Ischium, Legs. Feet and Heels, and Under Medical Devices         Does the Patient have a Wound? No noted wound(s)       Scar Prevention initiated by RN: No  Wound Care Orders initiated by RN: No    Pressure Injury (Stage 3,4, Unstageable, DTI, NWPT, and Complex wounds) if present, place Wound referral order by RN under : No    New Ostomies, if present place, Ostomy referral order under : No     Nurse 1 eSignature: Electronically signed by Savana Brock RN on 4/23/24 at 6:40 PM EDT    **SHARE this note so that the co-signing nurse can place an eSignature**    Nurse 2 eSignature: {Esignature:[id number]}

## 2024-04-23 NOTE — CONSULTS
[facility] ACUTE CARE PHYSICAL THERAPY EVALUATION  Lori Brown, 1950, 1126/1126-A, 4/23/2024    History  Alutiiq:  There were no encounter diagnoses.  Patient  has a past medical history of Abdominal aortic aneurysm (AAA) without rupture ([facility]), Anhedonia, Asthma, Back ache, COPD (chronic obstructive pulmonary disease) ([facility]), COPD exacerbation ([facility]), Dizziness, H/O echocardiogram, History of nuclear stress test, History of nuclear stress test, Needs flu shot, Other chest pain, PONV (postoperative nausea and vomiting), and Stable angina pectoris ([facility]).  Patient  has a past surgical history that includes Hysterectomy; Appendectomy; Middle ear surgery; and Tonsillectomy.    Discharge Recommendation: Patient has moderate post-acute physical therapy therapy service needs vs home with home health PT and initial 24/7 support pending progress    Subjective:    Patient states:  \"I am so nauseous\".      Pain:  pt reports 8-9/10 pain to L thigh to knee. Pt was pre-medicated prior to PT arrival       Communication with other providers:  Handoff to RN - RN notified pt actively vomiting during session     Restrictions: general precautions, fall risk, LLE WBAT     Home Setup/Prior level of function  Social/Functional History  Lives With: Spouse  Type of Home: House  Home Layout: Two level, Able to Live on Main level with bedroom/bathroom  Home Access: Stairs to enter without rails  Entrance Stairs - Number of Steps: 3  Bathroom Shower/Tub: Tub/Shower unit  Bathroom Equipment: Shower chair, Grab bars in shower  Home Equipment: Walker, 4 wheeled  Has the patient had two or more falls in the past year or any fall with injury in the past year?: Yes  Receives Help From: Family  ADL Assistance: Independent  Homemaking Assistance: Independent  Ambulation Assistance: Independent (cane PRN)  Transfer Assistance: Independent  Active : Yes  Occupation: Retired    Examination of body systems (includes body  gait training. Pt demonstrates a significant decline with her overall functional mobility, reduced strength, impaired balance, impaired gait, and reduced activity tolerance. Pt will continue to benefit from acute PT services with discharge to facility for moderate post acute rehab vs home with home health PT and initial 24/7 support pending progress.     Complexity: moderate    Prognosis: Good, no significant barriers to participation at this time.   General Plan: 2 times a day 7 days a week       Equipment: FWW for home     Goals:  Short Term Goals  Time Frame for Short Term Goals: 1 week or until discharge  Short Term Goal 1: Pt will be able to perform all aspects of bed mobility with Bernard  Short Term Goal 2: Pt will be able to perform STS using FWW with Bernard  Short Term Goal 3: Pt will be able to perform functional transfers using FWW with Bernard  Short Term Goal 4: Pt will be able to ambulate 300ft using FWW with Bernard  Short Term Goal 5: Pt will be able to ascend/descend 3 steps using LRAD with Bernard       Treatment plan:  Bed mobility, transfers, balance, gait, TA, TX, neuro re-ed     Recommendations for NURSING mobility: ambulate using FWW with CGA     Time:   Time in: 1612  Time out: 1656  Timed treatment minutes: 30  Total time: 44    Electronically signed by:    Santiago Mo, PT, DPT  4/23/2024, 5:10 PM

## 2024-04-23 NOTE — BRIEF OP NOTE
Brief Postoperative Note      Patient: Lori Brown  YOB: 1950  MRN: [id number]    Date of Procedure: 4/23/2024    Pre-Op Diagnosis Codes:     * Primary osteoarthritis of left knee [M17.12]     * Chronic pain of left knee [M25.562, G89.29]    Post-Op Diagnosis: Same       Procedure(s):  LEFT KNEE TOTAL ARTHROPLASTY    Surgeon(s):  Nile Malik DO    Assistant:  First Assistant: Michael Whitman PA-C    Anesthesia: General    Estimated Blood Loss (mL): less than 100     Complications: None    Specimens:   * No specimens in log *    Implants:  Implant Name Type Inv. Item Serial No.  Lot No. LRB No. Used Action   CEMENT BNE 40GM W/ GENT HI VISC RADPQ FOR REV SURG - [id number]  CEMENT BNE 40GM W/ GENT HI VISC RADPQ FOR REV SURG  [employer] [id number] Left 2 Implanted   PIN DRL L75MM DIA3.2MM HEX 2.5MM TRCR TIP DISP FOR PERSONA - [id number]  PIN DRL L75MM DIA3.2MM HEX 2.5MM TRCR TIP DISP FOR PERSONA  [employer]- [id number] Left 1 Implanted   COMPONENT PAT GWO99QI THK8.5MM KNEE POLY TERESSA CONVENTIONAL - [id number]  COMPONENT PAT NRT88GI THK8.5MM KNEE POLY TERESSA CONVENTIONAL  [employer]-  Left 1 Implanted   IMPL KNEE PSN FEM CR CMT CCR NRW SZ7 L - [id number] Knee IMPL KNEE PSN FEM CR CMT CCR NRW SZ7 L  [employer]  Left 1 Implanted   DUP USE [id number] IMPL KNEE PSN TIB STM 5 DEG SZ D L - [id number] Knee DUP USE 605644 IMPL KNEE PSN TIB STM 5 DEG SZ D L  [employer]  Left 1 Implanted   PSN MC VE ASF L 10MM 6-7/CD - [id number]  PSN MC VE ASF L 10MM 6-7/CD  [employer]WD  Left 1 Implanted         Drains: * No LDAs found *    Findings:  Infection Present At Time Of Surgery (PATOS) (choose all levels that have infection present):  No infection present  Other Findings: L knee OA    Electronically signed by NILE MALIK DO on 4/23/2024 at 12:52 PM

## 2024-04-23 NOTE — H&P
Subjective:      Patient ID: Lori Brown is a 74 y.o. female.     Patient is here today to discuss TKA.     She comes in today for her first visit with me in regards to her left knee pain.  She states that she has been dealing with worsening deep, aching and grinding pain globally in her left knee for many years.  She states that over the past 6 months the pain is gone to the point that she is having difficulty with her daily activities.  She has had several different types of injections without any improvement in her symptoms.  Patient denies any new injury to the involved extremity/ joint, denies numbness or tingling in the involved extremity and denies fever or chills.              Review of Systems   Constitutional:  Negative for activity change, chills and fever.   HENT:  Negative for congestion and drooling.    Eyes:  Negative for redness.   Respiratory:  Negative for chest tightness.    Cardiovascular:  Negative for chest pain.   Gastrointestinal:  Negative for abdominal pain.   Endocrine: Negative for cold intolerance and heat intolerance.   Musculoskeletal:  Positive for arthralgias, gait problem, joint swelling and myalgias. Negative for back pain.   Skin:  Negative for color change, pallor, rash and wound.   Neurological:  Negative for weakness and numbness.   Psychiatric/Behavioral:  Negative for confusion.          Past Medical History        Past Medical History:   Diagnosis Date    Abdominal aortic aneurysm (AAA) without rupture ([facility]) 02/25/2021     Following with Dr. Rosario    Aneurysm of abdominal aorta ([facility])      Anhedonia 10/31/2018    Asthma      Back ache       reason for taking tramadol    COPD (chronic obstructive pulmonary disease) ([facility])      COPD exacerbation ([facility]) 02/26/2018    Dizziness 11/18/2021    H/O echocardiogram 02/18/2020     EF 50-55%.  Sclerotic, but non-stenotic aortic valve. Moderate AR. Mild TR, MR.     History of nuclear stress test 02/18/2020     EF 75%. This is a normal  Sister     Stroke Sister     Cancer Brother     Cancer Maternal Grandmother     Heart Disease Maternal Grandmother     Colon Cancer Maternal Grandfather        Objective:   Physical Exam  Constitutional:       Appearance: She is well-developed.   HENT:      Head: Normocephalic.      Nose: No congestion.   Eyes:      Pupils: Pupils are equal, round, and reactive to light.   Pulmonary:      Effort: Pulmonary effort is normal.   Musculoskeletal:         General: Swelling and tenderness present. No deformity. Normal range of motion.      Cervical back: Normal range of motion.      Right hip: Normal.      Left hip: Normal.      Right knee: No swelling, deformity, effusion, erythema, ecchymosis, lacerations or bony tenderness. Normal range of motion. No tenderness. No medial joint line, lateral joint line, MCL, LCL or patellar tendon tenderness. No LCL laxity or MCL laxity. Normal alignment and normal patellar mobility.      Left knee: Swelling, bony tenderness and crepitus present. No deformity, effusion, erythema, ecchymosis or lacerations. Normal range of motion. Tenderness present over the medial joint line, lateral joint line and patellar tendon. No MCL or LCL tenderness. No LCL laxity or MCL laxity.Normal alignment and normal patellar mobility.   Skin:     General: Skin is warm and dry.      Capillary Refill: Capillary refill takes less than 2 seconds.      Coloration: Skin is not pale.      Findings: No erythema or rash.   Neurological:      Mental Status: She is alert and oriented to person, place, and time.      Sensory: No sensory deficit.      Motor: No weakness.         Left knee-Skin intact with no erythema, ecchymosis or lacerations present.  0-140     XRAY  X-ray 3 views of the left knee from November 2, 2023 reviewed by me today in the office demonstrates age appropriate bone density throughout with severe degenerative changes with medial and patellofemoral joint space collapse, moderate osteophyte  formation in all 3 compartments, normal tracking of the patella, no acute osseous abnormalities.     /67   Pulse 92   Temp 98.1 °F (36.7 °C)   Resp 18   SpO2 92%     Assessment:   Left knee OA, severe                Plan:   I discussed with her today her x-ray findings.  I explained to her that she does have severe arthritis in the left knee.  At this point given her persistent and worsening symptoms despite conservative treatment and with her x-ray findings I recommend surgical treatment.  I discussed with her today performing left total knee replacement surgery.  I explained risks, benefits, possible complications of the procedure and answered all questions for the patient.    I explained postoperative rehabilitation protocol and expectations with the patient today.  The patient understands and consents to the procedure.    Patient will follow up with their primary care physician prior to surgical treatment for preoperative clearance.  We will schedule surgery at soonest convenience.  Continue weight-bearing as tolerated.  Continue range of motion exercises as instructed.  Ice and elevate as needed.  Tylenol or Motrin for pain.  Follow up in 3 weeks postop.  Would like to spend the night in the hospital after surgery and we will use cemented implants.     Lori Brown was evaluated today and a DME order was entered for a standard walker because she requires this to successfully complete daily living tasks of ambulating.  A standard walker is necessary due to the patient's impaired ambulation and mobility restrictions and she can ambulate only by using a walker instead of a lesser assistive device, such as a cane or crutch.  The need for this equipment was discussed with the patient and she understands and is in agreement.         Pt seen and examined, No change in H+P.                     ROBERTO LANGSTON DO

## 2024-04-23 NOTE — ANESTHESIA PROCEDURE NOTES
Peripheral Block    Patient location during procedure: procedure area  Reason for block: post-op pain management and at surgeon's request  Start time: 4/23/2024 10:15 AM  End time: 4/23/2024 10:30 AM  Staffing  Performed: anesthesiologist   Anesthesiologist: Jeremiah Menchaca MD  Performed by: Jeremiah Menchaca MD  Authorized by: Jeremiah Menchaca MD    Preanesthetic Checklist  Completed: patient identified, IV checked, site marked, risks and benefits discussed, surgical/procedural consents, equipment checked, pre-op evaluation, timeout performed, anesthesia consent given, oxygen available, monitors applied/VS acknowledged, fire risk safety assessment completed and verbalized and blood product R/B/A discussed and consented  Peripheral Block   Patient position: supine  Prep: ChloraPrep  Provider prep: mask  Patient monitoring: cardiac monitor, continuous pulse ox, frequent blood pressure checks, IV access, oxygen and responsive to questions  Block type: iPacks  Laterality: left  Injection technique: single-shot  Guidance: ultrasound guided  Local infiltration: lidocaine  Local infiltration: lidocaine    Needle   Needle type: insulated echogenic nerve stimulator needle   Needle localization: ultrasound guidance  Needle length: 3 cm.  Assessment   Injection assessment: negative aspiration for heme, no paresthesia on injection, local visualized surrounding nerve on ultrasound, no intravascular symptoms and low pressure verified by pressure monitor  Paresthesia pain: none  Slow fractionated injection: yes  Hemodynamics: stable  Outcomes: patient tolerated procedure well and uncomplicated    Medications Administered  dexAMETHasone (DECADRON) injection 4 mg/mL - Perineural   4 mg - 4/23/2024 10:15:00 AM  ROPivacaine (NAROPIN) 0.25% in sodium chloride (Mixture components: ROPivacaine 0.5% Soln, 10 mL; sodium chloride 0.9 % Soln, 10 mL) - Perineural   20 mL - 4/23/2024 10:15:00 AM

## 2024-04-23 NOTE — ANESTHESIA PROCEDURE NOTES
Peripheral Block    Patient location during procedure: procedure area  Reason for block: post-op pain management and at surgeon's request  Start time: 4/23/2024 10:15 AM  End time: 4/23/2024 10:30 AM  Staffing  Performed: anesthesiologist   Anesthesiologist: Jeremiah Menchaca MD  Performed by: Jeremiah Menchaca MD  Authorized by: Jeremiah Menchaca MD    Preanesthetic Checklist  Completed: patient identified, IV checked, site marked, risks and benefits discussed, surgical/procedural consents, equipment checked, pre-op evaluation, timeout performed, anesthesia consent given, oxygen available, monitors applied/VS acknowledged, fire risk safety assessment completed and verbalized and blood product R/B/A discussed and consented  Peripheral Block   Patient position: supine  Prep: ChloraPrep  Provider prep: mask  Patient monitoring: cardiac monitor, continuous pulse ox, frequent blood pressure checks, IV access, oxygen and responsive to questions  Block type: Femoral  Adductor canal  Laterality: left  Injection technique: single-shot  Guidance: ultrasound guided  Local infiltration: lidocaine  Local infiltration: lidocaine    Needle   Needle type: insulated echogenic nerve stimulator needle   Needle length: 4cm.  Assessment   Injection assessment: negative aspiration for heme, no paresthesia on injection, local visualized surrounding nerve on ultrasound, no intravascular symptoms and low pressure verified by pressure monitor  Paresthesia pain: none  Slow fractionated injection: yes  Hemodynamics: stable  Outcomes: patient tolerated procedure well and uncomplicated    Medications Administered  midazolam (VERSED) injection 2 mg/2mL - IntraVENous   2 mg - 4/23/2024 10:15:00 AM  dexAMETHasone (DECADRON) injection 4 mg/mL - Perineural   4 mg - 4/23/2024 10:15:00 AM  ropivacaine (NAROPIN) injection 0.5% - Perineural   20 mL - 4/23/2024 10:15:00 AM

## 2024-04-23 NOTE — PLAN OF CARE
Problem: Discharge Planning  Goal: Discharge to home or other facility with appropriate resources  Outcome: Progressing  Flowsheets  Taken 4/23/2024 1442  Discharge to home or other facility with appropriate resources: Identify barriers to discharge with patient and caregiver  Taken 4/23/2024 1415  Discharge to home or other facility with appropriate resources: Identify barriers to discharge with patient and caregiver     Problem: Pain  Goal: Verbalizes/displays adequate comfort level or baseline comfort level  Outcome: Progressing

## 2024-04-23 NOTE — OP NOTE
DATE OF PROCEDURE:  4/23/2024    PREOPERATIVE DIAGNOSIS:  Left knee DJD.    POSTOPERATIVE DIAGNOSIS:  Left knee DJD.    PROCEDURE:  Left total knee arthroplasty using [employer]  MC knee with size 7 Narrow femoral component, size D tibial  component, size 32 patellar component and size 10 tibial poly component  with antibiotic-impregnated cement.    SURGEON:  Nile Malik DO    FIRST ASSISTANT: ELIZA Santiago    ANESTHESIA:  General with regional block.    ESTIMATED BLOOD LOSS:  100 mL.    TOTAL TOURNIQUET TIME:  38 minutes.    FLUIDS:  400 mL of crystalloids.    INDICATIONS FOR PROCEDURE:  The patient is a 74-year-old female with  long-standing history of left knee pain.  For this, she underwent  conservative treatment with no relief of her symptoms.  X-rays revealed  severe arthritis in the left knee.  Given her persistent symptoms  despite conservative treatment and with her x-ray findings, I  recommended surgical treatment.  I explained the risks, benefits and  possible complications of the procedure to the patient and after  answering all of her questions, she consented to undergo the above  procedure.    REPORT OF PROCEDURE:  The patient was seen and evaluated in the  preoperative holding area where the left lower extremity was signed in  her presence.  At this point, care of the patient was turned over to  anesthesia team who performed a regional block to the left lower  extremity.  She was then transported back to the operative suite.  Spinal anesthesia was attempted but was unsuccessful so general anesthesia was performed and once adequate anesthesia was obtained, the  left lower extremity was prepped and draped in usual sterile fashion.   Preoperative antibiotics were administered, 1 gm of TXA was administered  IV.  At this point, a time-out was performed and all in attendance were  in agreement.    I exsanguinated the left lower extremity with the use of an Esmarch and  tourniquet was  anesthesia and transported to PACU in stable condition.  She  appeared to have tolerated the procedure well.    PROGNOSIS:  At this point, she will be admitted to the hospital for  postoperative pain control, rehabilitation and medical monitoring.   Hospitalist will be consulted for medical management and physical  therapy will be consulted for gait training and she could be  weightbearing as tolerated on the left leg.  She will receive antibiotic  prophylaxis and DVT prophylaxis during her hospitalization.  Following  her discharge, I will see her back in the office in 3 weeks and will continue to monitor her progress in the outpatient setting for resolution of her symptoms.    John was present for the entirety of the procedure and vital for the performance of the procedure. John assisted with positioning, prepping, draping of the patient before the procedure and instrument manipulation, extremity repositioning and camera operation during the procedure as well as wound closure, dressing application and brace application after the procedure. Please note that no intern, resident, or other hospital staff was available to assist during the surgery.      ROBERTO LANGSTON,

## 2024-04-24 LAB
GLUCOSE BLD-MCNC: 103 MG/DL (ref 70–99)
HCT VFR BLD CALC: 35.7 % (ref 37–47)
HEMOGLOBIN: 11.8 GM/DL (ref 12.5–16)

## 2024-04-24 PROCEDURE — 6360000002 HC RX W HCPCS: Performed by: ORTHOPAEDIC SURGERY

## 2024-04-24 PROCEDURE — 94010 BREATHING CAPACITY TEST: CPT

## 2024-04-24 PROCEDURE — 2580000003 HC RX 258: Performed by: ORTHOPAEDIC SURGERY

## 2024-04-24 PROCEDURE — 97535 SELF CARE MNGMENT TRAINING: CPT

## 2024-04-24 PROCEDURE — 6370000000 HC RX 637 (ALT 250 FOR IP): Performed by: STUDENT IN AN ORGANIZED HEALTH CARE EDUCATION/TRAINING PROGRAM

## 2024-04-24 PROCEDURE — 97166 OT EVAL MOD COMPLEX 45 MIN: CPT

## 2024-04-24 PROCEDURE — 94640 AIRWAY INHALATION TREATMENT: CPT

## 2024-04-24 PROCEDURE — 2700000000 HC OXYGEN THERAPY PER DAY

## 2024-04-24 PROCEDURE — 85018 HEMOGLOBIN: CPT

## 2024-04-24 PROCEDURE — 94618 PULMONARY STRESS TESTING: CPT

## 2024-04-24 PROCEDURE — 85014 HEMATOCRIT: CPT

## 2024-04-24 PROCEDURE — 82962 GLUCOSE BLOOD TEST: CPT

## 2024-04-24 PROCEDURE — 36415 COLL VENOUS BLD VENIPUNCTURE: CPT

## 2024-04-24 PROCEDURE — 6370000000 HC RX 637 (ALT 250 FOR IP): Performed by: ORTHOPAEDIC SURGERY

## 2024-04-24 PROCEDURE — 94150 VITAL CAPACITY TEST: CPT

## 2024-04-24 PROCEDURE — 6370000000 HC RX 637 (ALT 250 FOR IP): Performed by: INTERNAL MEDICINE

## 2024-04-24 PROCEDURE — 97530 THERAPEUTIC ACTIVITIES: CPT

## 2024-04-24 PROCEDURE — 97116 GAIT TRAINING THERAPY: CPT

## 2024-04-24 PROCEDURE — 94761 N-INVAS EAR/PLS OXIMETRY MLT: CPT

## 2024-04-24 RX ORDER — OXYCODONE HYDROCHLORIDE 5 MG/1
5 TABLET ORAL EVERY 6 HOURS PRN
Qty: 28 TABLET | Refills: 0 | Status: SHIPPED | OUTPATIENT
Start: 2024-04-24 | End: 2024-05-01

## 2024-04-24 RX ORDER — ASPIRIN 325 MG
325 TABLET ORAL 2 TIMES DAILY
Qty: 28 TABLET | Refills: 0 | Status: SHIPPED | OUTPATIENT
Start: 2024-04-24 | End: 2024-05-08

## 2024-04-24 RX ORDER — MONTELUKAST SODIUM 10 MG/1
10 TABLET ORAL NIGHTLY
Status: DISCONTINUED | OUTPATIENT
Start: 2024-04-24 | End: 2024-04-26 | Stop reason: HOSPADM

## 2024-04-24 RX ORDER — CYCLOBENZAPRINE HCL 10 MG
10 TABLET ORAL EVERY 12 HOURS PRN
Qty: 30 TABLET | Refills: 0 | Status: SHIPPED | OUTPATIENT
Start: 2024-04-24 | End: 2024-05-04

## 2024-04-24 RX ORDER — PROMETHAZINE HYDROCHLORIDE 12.5 MG/1
12.5 TABLET ORAL EVERY 6 HOURS PRN
Qty: 30 TABLET | Refills: 0 | Status: SHIPPED | OUTPATIENT
Start: 2024-04-24 | End: 2024-05-01

## 2024-04-24 RX ORDER — IPRATROPIUM BROMIDE AND ALBUTEROL SULFATE 2.5; .5 MG/3ML; MG/3ML
1 SOLUTION RESPIRATORY (INHALATION) EVERY 4 HOURS PRN
Status: DISCONTINUED | OUTPATIENT
Start: 2024-04-24 | End: 2024-04-26 | Stop reason: HOSPADM

## 2024-04-24 RX ADMIN — IPRATROPIUM BROMIDE AND ALBUTEROL SULFATE 1 DOSE: 2.5; .5 SOLUTION RESPIRATORY (INHALATION) at 08:05

## 2024-04-24 RX ADMIN — HYDROMORPHONE HYDROCHLORIDE 0.5 MG: 1 INJECTION, SOLUTION INTRAMUSCULAR; INTRAVENOUS; SUBCUTANEOUS at 08:14

## 2024-04-24 RX ADMIN — SODIUM CHLORIDE, PRESERVATIVE FREE 10 ML: 5 INJECTION INTRAVENOUS at 21:03

## 2024-04-24 RX ADMIN — KETOROLAC TROMETHAMINE 15 MG: 30 INJECTION, SOLUTION INTRAMUSCULAR; INTRAVENOUS at 21:32

## 2024-04-24 RX ADMIN — HYDROMORPHONE HYDROCHLORIDE 0.5 MG: 1 INJECTION, SOLUTION INTRAMUSCULAR; INTRAVENOUS; SUBCUTANEOUS at 04:21

## 2024-04-24 RX ADMIN — OXYCODONE HYDROCHLORIDE 10 MG: 10 TABLET ORAL at 20:32

## 2024-04-24 RX ADMIN — ACETAMINOPHEN 650 MG: 325 TABLET ORAL at 05:49

## 2024-04-24 RX ADMIN — SODIUM CHLORIDE, POTASSIUM CHLORIDE, SODIUM LACTATE AND CALCIUM CHLORIDE: 600; 310; 30; 20 INJECTION, SOLUTION INTRAVENOUS at 11:01

## 2024-04-24 RX ADMIN — IPRATROPIUM BROMIDE AND ALBUTEROL SULFATE 1 DOSE: .5; 2.5 SOLUTION RESPIRATORY (INHALATION) at 15:48

## 2024-04-24 RX ADMIN — TRAZODONE HYDROCHLORIDE 100 MG: 50 TABLET ORAL at 20:33

## 2024-04-24 RX ADMIN — OXYCODONE HYDROCHLORIDE 10 MG: 10 TABLET ORAL at 11:29

## 2024-04-24 RX ADMIN — MONTELUKAST 10 MG: 10 TABLET, FILM COATED ORAL at 20:32

## 2024-04-24 RX ADMIN — ACETAMINOPHEN 650 MG: 325 TABLET ORAL at 18:49

## 2024-04-24 RX ADMIN — PROMETHAZINE HYDROCHLORIDE 12.5 MG: 25 TABLET ORAL at 05:55

## 2024-04-24 RX ADMIN — OXYCODONE HYDROCHLORIDE 10 MG: 10 TABLET ORAL at 15:55

## 2024-04-24 RX ADMIN — KETOROLAC TROMETHAMINE 15 MG: 30 INJECTION, SOLUTION INTRAMUSCULAR; INTRAVENOUS at 02:59

## 2024-04-24 RX ADMIN — ACETAMINOPHEN 650 MG: 325 TABLET ORAL at 11:29

## 2024-04-24 RX ADMIN — HYDROMORPHONE HYDROCHLORIDE 0.5 MG: 1 INJECTION, SOLUTION INTRAMUSCULAR; INTRAVENOUS; SUBCUTANEOUS at 00:49

## 2024-04-24 RX ADMIN — SODIUM CHLORIDE, PRESERVATIVE FREE 10 ML: 5 INJECTION INTRAVENOUS at 08:19

## 2024-04-24 RX ADMIN — SODIUM CHLORIDE, POTASSIUM CHLORIDE, SODIUM LACTATE AND CALCIUM CHLORIDE: 600; 310; 30; 20 INJECTION, SOLUTION INTRAVENOUS at 21:04

## 2024-04-24 RX ADMIN — KETOROLAC TROMETHAMINE 15 MG: 30 INJECTION, SOLUTION INTRAMUSCULAR; INTRAVENOUS at 08:13

## 2024-04-24 RX ADMIN — KETOROLAC TROMETHAMINE 15 MG: 30 INJECTION, SOLUTION INTRAMUSCULAR; INTRAVENOUS at 15:56

## 2024-04-24 RX ADMIN — ASPIRIN 325 MG: 325 TABLET ORAL at 08:13

## 2024-04-24 RX ADMIN — ASPIRIN 325 MG: 325 TABLET ORAL at 20:32

## 2024-04-24 RX ADMIN — WATER 2000 MG: 1 INJECTION INTRAMUSCULAR; INTRAVENOUS; SUBCUTANEOUS at 04:21

## 2024-04-24 ASSESSMENT — COPD QUESTIONNAIRES
QUESTION2_CHESTPHLEGM: 0
QUESTION4_WALKINCLINE: 4
QUESTION3_CHESTTIGHTNESS: 0
QUESTION6_LEAVINGHOUSE: 0
TOTAL_EXACERBATIONS_PASTYEAR: 0
QUESTION1_COUGHFREQUENCY: 2
CAT_TOTALSCORE: 15
QUESTION5_HOMEACTIVITIES: 4
GOLD_GROUP: GROUP B
QUESTION7_SLEEPQUALITY: 2
QUESTION8_ENERGYLEVEL: 3

## 2024-04-24 ASSESSMENT — PAIN DESCRIPTION - LOCATION
LOCATION: KNEE

## 2024-04-24 ASSESSMENT — PAIN SCALES - GENERAL
PAINLEVEL_OUTOF10: 7
PAINLEVEL_OUTOF10: 8
PAINLEVEL_OUTOF10: 8
PAINLEVEL_OUTOF10: 5
PAINLEVEL_OUTOF10: 7
PAINLEVEL_OUTOF10: 6
PAINLEVEL_OUTOF10: 8
PAINLEVEL_OUTOF10: 6
PAINLEVEL_OUTOF10: 7
PAINLEVEL_OUTOF10: 5
PAINLEVEL_OUTOF10: 7
PAINLEVEL_OUTOF10: 4
PAINLEVEL_OUTOF10: 3
PAINLEVEL_OUTOF10: 8
PAINLEVEL_OUTOF10: 0
PAINLEVEL_OUTOF10: 7
PAINLEVEL_OUTOF10: 8
PAINLEVEL_OUTOF10: 5

## 2024-04-24 ASSESSMENT — PAIN DESCRIPTION - DESCRIPTORS
DESCRIPTORS: THROBBING
DESCRIPTORS: ACHING
DESCRIPTORS: ACHING
DESCRIPTORS: THROBBING
DESCRIPTORS: ACHING;SHARP
DESCRIPTORS: THROBBING
DESCRIPTORS: ACHING

## 2024-04-24 ASSESSMENT — PAIN DESCRIPTION - ONSET
ONSET: ON-GOING
ONSET: AWAKENED FROM SLEEP
ONSET: ON-GOING

## 2024-04-24 ASSESSMENT — PAIN DESCRIPTION - FREQUENCY
FREQUENCY: CONTINUOUS
FREQUENCY: INTERMITTENT
FREQUENCY: CONTINUOUS

## 2024-04-24 ASSESSMENT — PAIN DESCRIPTION - PAIN TYPE
TYPE: SURGICAL PAIN
TYPE: SURGICAL PAIN
TYPE: ACUTE PAIN
TYPE: SURGICAL PAIN
TYPE: SURGICAL PAIN

## 2024-04-24 ASSESSMENT — PAIN DESCRIPTION - ORIENTATION
ORIENTATION: RIGHT
ORIENTATION: LEFT

## 2024-04-24 ASSESSMENT — PAIN SCALES - WONG BAKER
WONGBAKER_NUMERICALRESPONSE: NO HURT
WONGBAKER_NUMERICALRESPONSE: HURTS LITTLE MORE
WONGBAKER_NUMERICALRESPONSE: HURTS A LITTLE BIT

## 2024-04-24 ASSESSMENT — PULMONARY FUNCTION TESTS
POST BRONCHODILATOR FEV1/FVC: 86
PIF_VALUE: 120
FEV1 (%PREDICTED): 91

## 2024-04-24 ASSESSMENT — PAIN - FUNCTIONAL ASSESSMENT
PAIN_FUNCTIONAL_ASSESSMENT: PREVENTS OR INTERFERES SOME ACTIVE ACTIVITIES AND ADLS
PAIN_FUNCTIONAL_ASSESSMENT: ACTIVITIES ARE NOT PREVENTED
PAIN_FUNCTIONAL_ASSESSMENT: PREVENTS OR INTERFERES SOME ACTIVE ACTIVITIES AND ADLS
PAIN_FUNCTIONAL_ASSESSMENT: PREVENTS OR INTERFERES SOME ACTIVE ACTIVITIES AND ADLS

## 2024-04-24 NOTE — DISCHARGE SUMMARY
ADMIT DATE: 4/23/2024    DISCHARGE DATE: 4/25/2024    PROVIDER:     Nile Malik DO                      ADMISSION DIAGNOSIS:  Left knee DJD.     DISCHARGE DIAGNOSIS:  Left knee DJD.     PROCEDURE:  Left total knee arthroplasty on 4/23/2024     HOSPITAL COURSE:  The patient is a 74 year-old female with a  longstanding history of left knee pain.  For this, she was brought to  [facility] on 4/23/2024 where she underwent left   total knee arthroplasty.  She was admitted postoperatively  for pain control, rehabilitation, and medical monitoring.  Hospitalist  was consulted for medical management.  Physical Therapy was consulted  for gait training.  She did receive antibiotic prophylaxis and DVT  prophylaxis during her hospitalization.  Throughout her hospital  course, clinically, she remained stable with no apparent medical  complications.  She was progressing well with physical therapy and her  pain was well controlled with oral medications.   was  consulted for discharge planning.  Her discharge was delayed 1 day due to low oxygen saturation and once this improved she was clinically she was  stable, she was discharged to home on 4/25/2024.     DISCHARGE MEDICATIONS:    1.  Oxycodone 5 mg one p.o. q.6 h. p.r.n. Pain.  2.   mg p.o. b.i.d. for 14 days  3.  Flexeril 10 mg 1 p.o. every 8 hours as needed.  4.  Phenergan 6.25 mg every 8 hours as needed.  5.  Other medications per the MAR.     DISCHARGE INSTRUCTIONS:    She is to have physical therapy for gait training and range of motion, and can be weightbearing as tolerated on the left leg.    Her incision is to be kept clean, dry, and intact at all times.    She is to ice and elevate the left lower extremity for pain and swelling.    She is to contact my office if she develops increased pain, swelling, numbness, tingling, redness, fevers, or chills.    She is to follow up in my office in 3 weeks at her prescheduled

## 2024-04-24 NOTE — PROGRESS NOTES
Lori Brown is a 74 y.o. female patient.  No diagnosis found.  Past Medical History:   Diagnosis Date    Abdominal aortic aneurysm (AAA) without rupture ([facility]) 02/25/2021    Following with Dr. Abraham Grant 10/31/2018    Asthma     Back ache     reason for taking tramadol    COPD (chronic obstructive pulmonary disease) ([facility])     COPD exacerbation ([facility]) 02/26/2018    Dizziness 11/18/2021    H/O echocardiogram 02/18/2020    EF 50-55%.  Sclerotic, but non-stenotic aortic valve. Moderate AR. Mild TR, MR.     History of nuclear stress test 02/18/2020    EF 75%. This is a normal study.    History of nuclear stress test 08/30/2022    Normal study.    Needs flu shot 09/28/2018    Other chest pain 03/19/2021    PONV (postoperative nausea and vomiting)     Stable angina pectoris ([facility]) 07/08/2021    stable     No past surgical history pertinent negatives on file.  Scheduled Meds:   tiotropium-olodaterol  2 puff Inhalation Daily    montelukast  10 mg Oral Nightly    sodium chloride flush  5-40 mL IntraVENous 2 times per day    acetaminophen  650 mg Oral Q6H    ketorolac  15 mg IntraVENous Q6H    aspirin  325 mg Oral BID    traZODone  100 mg Oral Nightly     Continuous Infusions:   lactated ringers IV soln 100 mL/hr at 04/24/24 1101    sodium chloride       PRN Meds:ipratropium 0.5 mg-albuterol 2.5 mg, sodium chloride flush, sodium chloride, oxyCODONE **OR** oxyCODONE, HYDROmorphone **OR** HYDROmorphone, cyclobenzaprine, ondansetron, albuterol sulfate HFA, tiZANidine, promethazine, promethazine    Allergies   Allergen Reactions    Norco [Hydrocodone-Acetaminophen] Nausea And Vomiting     Principal Problem:    History of total left knee replacement  Resolved Problems:    * No resolved hospital problems. *    Blood pressure (!) 118/53, pulse 71, temperature 98.4 °F (36.9 °C), temperature source Oral, resp. rate 18, height 1.499 m (4' 11\"), weight 59 kg (130 lb), SpO2 94 %, not currently

## 2024-04-24 NOTE — PROGRESS NOTES
4/24/2024 3:48 PM  Patient Room #: 1126/1126-A  Patient Name: Lori Brown    (Step 1 Done by RN if possible otherwise call Pulmonary Diagnostics)  Place patient on room air at rest for at least 30 minutes.  If patient falls below 88% before 30 minutes then you can record the level and stop.  Record room air saturation level _91_ %.  If patient is at 88% or below, they will qualify for home oxygen and you can stop.  If level does not fall below 88%, fill in level above. If indicated continue to Step 2.   Signature:__Rolf Martinez RRT__ Date: _04/24/2024__  (Step 2&3 Done by [facility])  Ambulate patient on room air until saturation falls below 89%.  Record level of room air saturation with ambulation__86_ %.  Next, place patient back on _2__lpm oxygen and ambulate, record level _92_%.  (Note:  this level must show improvement from room air level done with ambulation.)  If patient’s saturation on room air with ambulation is 88% or below AND patient shows improvement with oxygen during ambulation, they will qualify for home oxygen and you can stop.  If patient does not drop below 89%, then patient should have an overnight oximetry trending on room air to see if level falls below 88%.  Complete level in Step 3 below.    Room air overnight oximetry level 88 % for___  cumulative minutes.  If patient’s room air oxygen level is below <89% for any amount of time they will qualify for nocturnal home oxygen.        (Attach Night Trending Report)    Complete order below: Diagnosis:_COPD__  Home oxygen at:  Length of Need: X Lifetime ? 3 Months     _2_lpm or __%   via  [x] nasal cannula  []mask  [] other         []continuous [x]  with activity  [x]  Nocturnal   [x] Portable Tanks [x]  Concentrator  [x] Conserving Device        Therapist Signature:__Rolf Martinez RRT_____     Date:  _04/24/2024__  Physician Signature:  __Electronically Signed in EMR_    Date:___  Physician Printed Name:  ___Martine Schmidt MD  NPI:

## 2024-04-24 NOTE — PROGRESS NOTES
Outpatient Pharmacy Progress Note for Meds-to-Beds    Total number of Prescriptions Filled: 4    Additional Documentation:  Patient's family member picked-up the medication(s) in the OP Pharmacy      Thank you for letting us serve your patients.  [address] [address]    Phone: [phone]    Fax: [phone]

## 2024-04-24 NOTE — PROGRESS NOTES
Patient was seen in hospital for  COPD .  I am prescribing oxygen because the diagnosis and testing requires the patient to have oxygen in the home.  Conditions will improve or be benefited by oxygen use.  The patient is able to perform good mobility and therefore requires the use of a portable oxygen system for ambulation.

## 2024-04-24 NOTE — CONSULTS
[facility] ACUTE CARE OCCUPATIONAL THERAPY EVALUATION    Lori Brown, 1950, 1126/1126-A, 4/24/2024      Discharge Recommendation:  Recommend home with assist, including periodic support/supervision , assistance for IADLs, physical assistance for ADLs, and home health occupational therapy service (S3, S4).    History:  [place]:  The encounter diagnosis was History of total left knee replacement.  Past Medical History:   Diagnosis Date    Abdominal aortic aneurysm (AAA) without rupture ([facility]) 02/25/2021    Following with Dr. Abraham Grant 10/31/2018    Asthma     Back ache     reason for taking tramadol    COPD (chronic obstructive pulmonary disease) ([facility])     COPD exacerbation ([facility]) 02/26/2018    Dizziness 11/18/2021    H/O echocardiogram 02/18/2020    EF 50-55%.  Sclerotic, but non-stenotic aortic valve. Moderate AR. Mild TR, MR.     History of nuclear stress test 02/18/2020    EF 75%. This is a normal study.    History of nuclear stress test 08/30/2022    Normal study.    Needs flu shot 09/28/2018    Other chest pain 03/19/2021    PONV (postoperative nausea and vomiting)     Stable angina pectoris ([facility]) 07/08/2021    stable         Subjective:  Patient states: \"I do have to go to the bathroom\"  Pain: denied   Communication with other providers: RN, PT   Restrictions: general precautions, fall risk, WBAT LLE   Family at bedside    Home Setup/Prior level of function:  Social/Functional History  Lives With: Spouse  Type of Home: House  Home Layout: Two level, Able to Live on Main level with bedroom/bathroom  Home Access: Stairs to enter without rails  Entrance Stairs - Number of Steps: 3  Bathroom Shower/Tub: Tub/Shower unit  Bathroom Equipment: Shower chair, Grab bars in shower  Home Equipment: Walker, 4 wheeled  Has the patient had two or more falls in the past year or any fall with injury in the past year?: Yes  Receives Help From: Family  ADL Assistance: Independent  Homemaking Assistance:

## 2024-04-24 NOTE — PLAN OF CARE
Problem: Discharge Planning  Goal: Discharge to home or other facility with appropriate resources  4/24/2024 1334 by Gabrielle Luo RN  Outcome: Progressing  4/24/2024 0423 by Elba Denson LPN  Outcome: Progressing     Problem: Pain  Goal: Verbalizes/displays adequate comfort level or baseline comfort level  4/24/2024 1334 by Gabrielle Luo RN  Outcome: Progressing  Flowsheets (Taken 4/24/2024 0808)  Verbalizes/displays adequate comfort level or baseline comfort level: Encourage patient to monitor pain and request assistance  4/24/2024 0423 by Elba Denson LPN  Outcome: Progressing     Problem: Safety - Adult  Goal: Free from fall injury  4/24/2024 1334 by Gabrielle Luo RN  Outcome: Progressing  4/24/2024 0423 by Elba Denson LPN  Outcome: Progressing

## 2024-04-24 NOTE — PROGRESS NOTES
Physical Therapy Treatment Note  Name: Lori Brown MRN: [id number] :   1950   Date:  2024   Admission Date: 2024 Room:  [address] [place] [address]   Restrictions/Precautions: general precautions, fall risk, LLE WBAT   Communication with other providers:  Pt okay to see for therapy per RN   Subjective:  Patient states:  \"I was just showing you my new dance move\"   Pain:   Location, Type, Intensity (0/10 to 10/10): 7/10 pain with movement, pt reports increased pain and throbbing at end of session.   Objective:    Observation:  Pt supine in bed upon PTA arrival.   Objective Measures:  Tele, stable BP following ambulation and seated in recliner 111/43 (61), sitting up in recliner 100/45 (62) and stable, encouraged pt in fluid intake and Aps.   Treatment, including education/measures:    AM session:     Therapeutic Activity Training:   Therapeutic activity training was instructed today.  Cues were given for safety, sequence, UE/LE placement, awareness, and balance. Activities performed today included bed mobility training, sup-sit, sit-stand, SPT.    Mobility:  Sup > sit: SBA with use of leg .   Scooting: SBA   STS: CGA from EOB with RW. CGA from recliner to RW, Mod A to return to recliner dt episode of hypotension with BP read at 111/43 (61) when returned to sitting .   SPT: CGA with RW     Increased time for assessment of BP at end of session.     Gait:  Pt ambulated ~30ft with RW and CGA for safety progressing to Mod A to return to sitting as pt becomes hypotensive. Pt demos decreased step length, decreased stance on L LE, walker too close to hips, step-to gait progressing to step-through. PTA provided cues for walker at midline, upright gaze and posture, and for pacing.     Pt ascended/descended two 6in steps, three 4in steps with B handrail use and PTA provided max cues for LE advancement. Pt requires only CGA this date for safety.     Extended rest break following stair ascend/descent.      Education:  PTA educated pt on options for ascending/descending steps if pt were to return home. PTA emphasized importance of safety and B support while ascending steps. PTA reinforced appropriate LE advancement and use of gait belt. Pt and daughter verbalize understanding.   PTA educated pt on performance of exercises and activity frequency for optimal strength and ROM.    Ice chest refilled by PTA.     Safety:   Pt returned safely to recliner with chair alarm activated, call light in reach, all needs met.     PM session:    Therapeutic Activity Training:   Therapeutic activity training was instructed today.  Cues were given for safety, sequence, UE/LE placement, awareness, and balance.  Activities performed today included bed mobility training, sup-sit, sit-stand, SPT.    Mobility:  Sup > sit: SBA  Scooting: SBA  STS: CGA from EOB, CGA from commode.     Upon standing for assessment of BP, PTA observed heavy breathing and tremors. PTA assessed O2 and found 82% SpO2, PTA notified nursing. Pt O2 increasing to 90% on 3L and nursing agreeable to increase to 4L and pt maintaining at ~93%. RN notified MD.     Gait:  Pt ambulated ~10ft x 2 with RW and CGA for safety. Pt demos decreased step length, decreased gait speed, decreased foot clearance, TTWB at LLE despite WBAT status, poor endurance overall. PTA provided cues for navigation and safety at this time.     Increased time for communication with nursing regarding vitals and positioning/ice placement for pain relief.     Safety:   Pt returned safely to recliner with chair alarm activated, call light in reach, all needs met.   Assessment / Impression:    Pt presents with improved functional mobility this date but does present with fluctuating vitals resulting in near-syncopal episode and desaturation of O2.   Patient's tolerance of treatment:  Good   Adverse Reaction: none  Significant change in status and impact:  none  Barriers to improvement:  Desaturation   Plan

## 2024-04-24 NOTE — PROGRESS NOTES
04/24/24 1520   Spirometry Assessment   FEV1 (%PRED) 91   Post Bronchodilator FEV/FVC 86   COPD Exacerbations in last year 0    L/min   COPD Assessment (CAT Score)   Cough Assessment 2   Phlegm Assessment 0   Chest tightness 0   Walking on an incline 4   Home Activities 4   Confident Leaving The Home 0   Sleeping Soundly 2   Have Energy 3   Assessment Score 15   $RT COPD Assessment Yes   GOLD Staging   Group Group B

## 2024-04-24 NOTE — PROGRESS NOTES
Notified hospitalist, pulmonologist and surgeon of low pulse ox and restarting oxygen and an episode with therapy wheire if they had not been there she would have fallen.  Dr. Young will monitor and keep her another day until we can work on home oxygen .

## 2024-04-24 NOTE — CONSULTS
[facility] [address] [place] [address]                              CONSULTATION      PATIENT NAME: DEMIAN JONES             : 1950  MED REC NO: [id number]                      ROOM: 1126  ACCOUNT NO: [id number]                       ADMIT DATE: 2024  PROVIDER: Wilian Tracy MD      HISTORY OF PRESENT ILLNESS:  The patient is a 74-year-old lady with multiple medical problems including bronchial asthma, COPD, who was admitted to the hospital for total knee replacement surgery.  The patient is doing well postoperatively.  She denies any shortness of breath.  She denies any cough.  She denies any fever or chills.  She denies any nausea or vomiting.    PAST MEDICAL HISTORY:  Significant for COPD, bronchial asthma, coronary artery disease.    PAST SURGICAL HISTORY:  Remarkable for appendectomy, hysterectomy, tonsillectomy.    FAMILY HISTORY:  Reveals that her mother had asthma, father had diabetes.    SOCIAL HISTORY:  Reveals that she quit smoking, but used to smoke a pack per day for 30 years prior to that.  No history of alcohol or drug abuse.    MEDICATIONS:  Reviewed.    ALLERGIES:  SHE IS ALLERGIC TO NORCO.      REVIEW OF SYSTEMS:  Ten to fourteen-point review of systems was reviewed and is negative except for what is mentioned history of present illness.    PHYSICAL EXAMINATION:  GENERAL:  The patient is alert, oriented x3, in no acute respiratory distress.   VITAL SIGNS:  Her blood pressure is 118/53 mmHg, pulse of 71 per minute, and respiratory rate of 18 per minute.  She is afebrile.  Her saturation is 94% on 3 L nasal cannula.  HEENT:  Essentially unremarkable.  There is no JVD.  No lymphadenopathy.  NECK:  Supple.  LUNGS:  Diminished breath sounds.  There are no rhonchi.   HEART:  Normal S1, S2.  ABDOMEN:  Benign.  There is no evidence of any organomegaly.  The bowel sounds are present.  NEUROLOGIC:  She is awake

## 2024-04-24 NOTE — CARE COORDINATION
Chart reviewed and met w/ pt to initiate discharge planning. CM introduced self and explained role. Patient is from home w/ her fiance and reports daughter lives across the street. Pt reports that her fiance and daughter will be able to assist at discharge. Pt reports she does not have HHC or DME currently. CM discussed therapy recommendations for continued therapy at discharge and CM discussed SNF vs HHC. Patient is planning to return home w/ HHC and transition to OP therapy once able. Pt reports she does not have a preference in HHC companies or DME company for rolling walker. Pt reports she does have 3 small steps to enter home and was not able to do much with therapy yesterday. CM advised that discharge plan can remain home w/ HHC pending therapy progress and plan for SNF as back up option, patient in agreement with plan.     Lori Brown was evaluated today and a DME order was entered for a wheeled walker because she requires this to successfully complete daily living tasks of eating, bathing, toileting, personal cares, ambulating, grooming, hygiene, dressing upper body, and dressing lower body.  A wheeled walker is necessary due to the patient's unsteady gait, upper body weakness, and inability to  an ambulation device; and she can ambulate only by pushing a walker instead of a lesser assistive device such as a cane, crutch, or standard walker.  The need for this equipment was discussed with the patient and she understands and is in agreement.     Electronically signed by Renetta Cisneros on 4/24/2024 at 9:59 AM     11:37 AM Notified BANDAR Archer, of need for rolling walker for patient and plan for daughter to .       04/24/24 0904   Service Assessment   Patient Orientation Alert and Oriented   Cognition Alert   History Provided By Patient   Primary Caregiver Self   Accompanied By/Relationship daughter Najma   Support Systems Spouse/Significant Other;Children   Patient's Healthcare  Decision Maker is: Legal Next of Kin   PCP Verified by CM Yes   Last Visit to PCP Within last 3 months   Prior Functional Level Independent in ADLs/IADLs   Can patient return to prior living arrangement Yes   Ability to make needs known: Good   Family able to assist with home care needs: Yes   Financial Resources Medicare   Community Resources None   Social/Functional History   Lives With Spouse   Type of Home House   Home Equipment Walker, 4 wheeled   Receives Help From Family   ADL Assistance Independent   Ambulation Assistance Independent   Transfer Assistance Independent   Active  Yes   Discharge Planning   Type of Residence House   Living Arrangements Spouse/Significant Other   Current Services Prior To Admission None   Potential Assistance Needed Home Care   DME Ordered? Walker   Potential Assistance Purchasing Medications No   Type of Home Care Services OT;PT;Nursing Services;Skilled Therapy   Patient expects to be discharged to: House   Services At/After Discharge   Services At/After Discharge Home Health;OT;PT;Nursing services   Condition of Participation: Discharge Planning   The Patient and/or Patient Representative was provided with a Choice of Provider? Patient   The Patient and/Or Patient Representative agree with the Discharge Plan? Yes   Freedom of Choice list was provided with basic dialogue that supports the patient's individualized plan of care/goals, treatment preferences, and shares the quality data associated with the providers?  Yes        no

## 2024-04-25 ENCOUNTER — APPOINTMENT (OUTPATIENT)
Dept: GENERAL RADIOLOGY | Age: 74
End: 2024-04-25
Attending: ORTHOPAEDIC SURGERY
Payer: MEDICARE

## 2024-04-25 VITALS
RESPIRATION RATE: 18 BRPM | SYSTOLIC BLOOD PRESSURE: 125 MMHG | BODY MASS INDEX: 26.21 KG/M2 | TEMPERATURE: 97.9 F | HEIGHT: 59 IN | DIASTOLIC BLOOD PRESSURE: 61 MMHG | HEART RATE: 82 BPM | WEIGHT: 130 LBS | OXYGEN SATURATION: 92 %

## 2024-04-25 LAB
ALBUMIN SERPL-MCNC: 3.4 GM/DL (ref 3.4–5)
ALP BLD-CCNC: 55 IU/L (ref 40–128)
ALT SERPL-CCNC: <5 U/L (ref 10–40)
ANION GAP SERPL CALCULATED.3IONS-SCNC: 9 MMOL/L (ref 7–16)
AST SERPL-CCNC: 13 IU/L (ref 15–37)
BASOPHILS ABSOLUTE: 0 K/CU MM
BASOPHILS RELATIVE PERCENT: 0.4 % (ref 0–1)
BILIRUB SERPL-MCNC: 0.2 MG/DL (ref 0–1)
BUN SERPL-MCNC: 12 MG/DL (ref 6–23)
CALCIUM SERPL-MCNC: 7.9 MG/DL (ref 8.3–10.6)
CHLORIDE BLD-SCNC: 105 MMOL/L (ref 99–110)
CO2: 25 MMOL/L (ref 21–32)
CREAT SERPL-MCNC: 0.6 MG/DL (ref 0.6–1.1)
DIFFERENTIAL TYPE: ABNORMAL
EOSINOPHILS ABSOLUTE: 0.3 K/CU MM
EOSINOPHILS RELATIVE PERCENT: 3.6 % (ref 0–3)
GFR SERPL CREATININE-BSD FRML MDRD: >90 ML/MIN/1.73M2
GLUCOSE SERPL-MCNC: 83 MG/DL (ref 70–99)
HCT VFR BLD CALC: 31.4 % (ref 37–47)
HEMOGLOBIN: 10.3 GM/DL (ref 12.5–16)
IMMATURE NEUTROPHIL %: 0.4 % (ref 0–0.43)
LYMPHOCYTES ABSOLUTE: 2.1 K/CU MM
LYMPHOCYTES RELATIVE PERCENT: 29.7 % (ref 24–44)
MAGNESIUM: 1.9 MG/DL (ref 1.8–2.4)
MCH RBC QN AUTO: 31.9 PG (ref 27–31)
MCHC RBC AUTO-ENTMCNC: 32.8 % (ref 32–36)
MCV RBC AUTO: 97.2 FL (ref 78–100)
MONOCYTES ABSOLUTE: 0.7 K/CU MM
MONOCYTES RELATIVE PERCENT: 9.6 % (ref 0–4)
NEUTROPHILS RELATIVE PERCENT: 56.3 % (ref 36–66)
NUCLEATED RBC %: 0 %
PDW BLD-RTO: 13.2 % (ref 11.7–14.9)
PHOSPHORUS: 3.3 MG/DL (ref 2.5–4.9)
PLATELET # BLD: 177 K/CU MM (ref 140–440)
PMV BLD AUTO: 9.4 FL (ref 7.5–11.1)
POTASSIUM SERPL-SCNC: 4.3 MMOL/L (ref 3.5–5.1)
PRO-BNP: 397.6 PG/ML
RBC # BLD: 3.23 M/CU MM (ref 4.2–5.4)
SEGMENTED NEUTROPHILS ABSOLUTE COUNT: 4.1 K/CU MM
SODIUM BLD-SCNC: 139 MMOL/L (ref 135–145)
TOTAL IMMATURE NEUTOROPHIL: 0.03 K/CU MM
TOTAL NUCLEATED RBC: 0 K/CU MM
TOTAL PROTEIN: 4.9 GM/DL (ref 6.4–8.2)
WBC # BLD: 7.2 K/CU MM (ref 4–10.5)

## 2024-04-25 PROCEDURE — 94640 AIRWAY INHALATION TREATMENT: CPT

## 2024-04-25 PROCEDURE — 97530 THERAPEUTIC ACTIVITIES: CPT

## 2024-04-25 PROCEDURE — 80053 COMPREHEN METABOLIC PANEL: CPT

## 2024-04-25 PROCEDURE — 6360000002 HC RX W HCPCS: Performed by: ORTHOPAEDIC SURGERY

## 2024-04-25 PROCEDURE — 83880 ASSAY OF NATRIURETIC PEPTIDE: CPT

## 2024-04-25 PROCEDURE — 85025 COMPLETE CBC W/AUTO DIFF WBC: CPT

## 2024-04-25 PROCEDURE — 36415 COLL VENOUS BLD VENIPUNCTURE: CPT

## 2024-04-25 PROCEDURE — 97116 GAIT TRAINING THERAPY: CPT

## 2024-04-25 PROCEDURE — 83735 ASSAY OF MAGNESIUM: CPT

## 2024-04-25 PROCEDURE — 94761 N-INVAS EAR/PLS OXIMETRY MLT: CPT

## 2024-04-25 PROCEDURE — 71046 X-RAY EXAM CHEST 2 VIEWS: CPT

## 2024-04-25 PROCEDURE — 6370000000 HC RX 637 (ALT 250 FOR IP): Performed by: INTERNAL MEDICINE

## 2024-04-25 PROCEDURE — 2700000000 HC OXYGEN THERAPY PER DAY

## 2024-04-25 PROCEDURE — 6370000000 HC RX 637 (ALT 250 FOR IP): Performed by: ORTHOPAEDIC SURGERY

## 2024-04-25 PROCEDURE — 2580000003 HC RX 258: Performed by: ORTHOPAEDIC SURGERY

## 2024-04-25 PROCEDURE — 84100 ASSAY OF PHOSPHORUS: CPT

## 2024-04-25 RX ORDER — CALCIUM CARBONATE 500 MG/1
500 TABLET, CHEWABLE ORAL 3 TIMES DAILY PRN
Status: DISCONTINUED | OUTPATIENT
Start: 2024-04-25 | End: 2024-04-26 | Stop reason: HOSPADM

## 2024-04-25 RX ADMIN — CALCIUM CARBONATE 500 MG: 500 TABLET, CHEWABLE ORAL at 15:20

## 2024-04-25 RX ADMIN — OXYCODONE HYDROCHLORIDE 5 MG: 5 TABLET ORAL at 15:20

## 2024-04-25 RX ADMIN — KETOROLAC TROMETHAMINE 15 MG: 30 INJECTION, SOLUTION INTRAMUSCULAR; INTRAVENOUS at 08:02

## 2024-04-25 RX ADMIN — ACETAMINOPHEN 650 MG: 325 TABLET ORAL at 18:14

## 2024-04-25 RX ADMIN — CALCIUM CARBONATE 500 MG: 500 TABLET, CHEWABLE ORAL at 18:15

## 2024-04-25 RX ADMIN — KETOROLAC TROMETHAMINE 15 MG: 30 INJECTION, SOLUTION INTRAMUSCULAR; INTRAVENOUS at 15:53

## 2024-04-25 RX ADMIN — ACETAMINOPHEN 650 MG: 325 TABLET ORAL at 01:29

## 2024-04-25 RX ADMIN — SODIUM CHLORIDE, POTASSIUM CHLORIDE, SODIUM LACTATE AND CALCIUM CHLORIDE: 600; 310; 30; 20 INJECTION, SOLUTION INTRAVENOUS at 08:10

## 2024-04-25 RX ADMIN — ACETAMINOPHEN 650 MG: 325 TABLET ORAL at 10:41

## 2024-04-25 RX ADMIN — KETOROLAC TROMETHAMINE 15 MG: 30 INJECTION, SOLUTION INTRAMUSCULAR; INTRAVENOUS at 03:13

## 2024-04-25 RX ADMIN — ACETAMINOPHEN 650 MG: 325 TABLET ORAL at 05:48

## 2024-04-25 RX ADMIN — ONDANSETRON 4 MG: 2 INJECTION INTRAMUSCULAR; INTRAVENOUS at 08:06

## 2024-04-25 RX ADMIN — ASPIRIN 325 MG: 325 TABLET ORAL at 08:03

## 2024-04-25 RX ADMIN — TIOTROPIUM BROMIDE AND OLODATEROL 2 PUFF: 3.124; 2.736 SPRAY, METERED RESPIRATORY (INHALATION) at 07:36

## 2024-04-25 RX ADMIN — OXYCODONE HYDROCHLORIDE 10 MG: 10 TABLET ORAL at 01:29

## 2024-04-25 RX ADMIN — OXYCODONE HYDROCHLORIDE 10 MG: 10 TABLET ORAL at 10:41

## 2024-04-25 ASSESSMENT — PAIN SCALES - GENERAL
PAINLEVEL_OUTOF10: 4
PAINLEVEL_OUTOF10: 6
PAINLEVEL_OUTOF10: 5
PAINLEVEL_OUTOF10: 6
PAINLEVEL_OUTOF10: 9
PAINLEVEL_OUTOF10: 5

## 2024-04-25 ASSESSMENT — PAIN - FUNCTIONAL ASSESSMENT
PAIN_FUNCTIONAL_ASSESSMENT: PREVENTS OR INTERFERES SOME ACTIVE ACTIVITIES AND ADLS
PAIN_FUNCTIONAL_ASSESSMENT: ACTIVITIES ARE NOT PREVENTED
PAIN_FUNCTIONAL_ASSESSMENT: ACTIVITIES ARE NOT PREVENTED

## 2024-04-25 ASSESSMENT — PAIN DESCRIPTION - DESCRIPTORS
DESCRIPTORS: ACHING;DISCOMFORT;THROBBING
DESCRIPTORS: DULL;DISCOMFORT
DESCRIPTORS: ACHING
DESCRIPTORS: ACHING
DESCRIPTORS: THROBBING
DESCRIPTORS: ACHING;DISCOMFORT
DESCRIPTORS: DULL;DISCOMFORT

## 2024-04-25 ASSESSMENT — PAIN DESCRIPTION - LOCATION
LOCATION: KNEE

## 2024-04-25 ASSESSMENT — PAIN DESCRIPTION - ORIENTATION
ORIENTATION: LEFT

## 2024-04-25 ASSESSMENT — PAIN DESCRIPTION - PAIN TYPE: TYPE: ACUTE PAIN

## 2024-04-25 NOTE — CARE COORDINATION
Chart reviewed and met w/ pt and daughter for continued discharge planning. CM re-introduced self and re-explained role. CM updated patient and daughter that patient walker has been arranged with [employer] and is ready for pickup, [facility] Both stated understanding and declined any other questions or needs at this time. CM remains available should needs present.

## 2024-04-25 NOTE — PLAN OF CARE
Problem: Discharge Planning  Goal: Discharge to home or other facility with appropriate resources  4/24/2024 2311 by Thalia Gary LPN  Outcome: Progressing  4/24/2024 1334 by Gabrielle Luo RN  Outcome: Progressing     Problem: Pain  Goal: Verbalizes/displays adequate comfort level or baseline comfort level  4/24/2024 2311 by Thalia Gary LPN  Outcome: Progressing  4/24/2024 1334 by Gabrielle Luo RN  Outcome: Progressing  Flowsheets (Taken 4/24/2024 0808)  Verbalizes/displays adequate comfort level or baseline comfort level: Encourage patient to monitor pain and request assistance     Problem: Safety - Adult  Goal: Free from fall injury  4/24/2024 2311 by Thalia Gary LPN  Outcome: Progressing  4/24/2024 1334 by Gabrielle Luo RN  Outcome: Progressing

## 2024-04-25 NOTE — PROGRESS NOTES
Physical Therapy Treatment Note  Name: Lori Brown MRN: [id number] :   1950   Date:  2024   Admission Date: 2024 Room:  [address] [place] [address]   Restrictions/Precautions:general precautions, fall risk, LLE WBAT   Communication with other providers:  Pt okay to see for therapy per RN   Subjective:  Patient states:  \"I'm doing better today\"   Pain:   Location, Type, Intensity (0/10 to 10/10):  5/10 with ambulation.   Objective:    Observation:  Pt supine in bed AM arrival.   Objective Measures:  Tele, O2 91 with ambulation, returning to 96% following ambulation. BP stable.   Treatment, including education/measures:    Upon AM arrival, pt with NC out of nose and PTA assess O2 at 79%. RN notified, NC replaced at 2L with O2 increasing only to 85% increased with nursing permission to 4L and pt sats at 93%. PTA deferred therapy at this time and returned later in AM.     AM Session:     Therapeutic Activity Training:   Therapeutic activity training was instructed today.  Cues were given for safety, sequence, UE/LE placement, awareness, and balance. Activities performed today included bed mobility training, sup-sit, sit-stand, SPT.    Mobility:  Sup > sit: SBA  Scooting: CGA   STS: CGA from EOB, CGA from commode     Gait:  Pt ambulated ~140ft with RW and CGA for safety. Pt demos fluctuation between step-through and continuous gait pattern, decreased step length, decreased gait speed, decreased WB through L LE , and increased WB through UEs. PTA provided cues for upright gaze and posture. O2 assess following ~70ft and pt sats at 91-92%.     Ice chest refilled and ice bag placed at posterior L knee for increased comfort.     Safety:   Pt returned safely to recliner with chair alarm activated, call light in reach, all needs met.     ATTEMPT on floor at 1518, pt is just returning to floor from MRI and PTA to f/u as schedule allows.     Assessment / Impression:    Pt tolerated OOB activities well this date but did

## 2024-04-25 NOTE — PROGRESS NOTES
Pt home O2 paperwork faxed to [facility]. Please do not discharge pt without oxygen. This testing will be good for 48 hours and will have to be repeated if pt has not discharged prior to then. If portable oxygen concentrator or tank has not been delivered prior to pt being ready to leave, please call PFT @ [phone] or Respiratory Therapy @ [phone]. Thanks.

## 2024-04-25 NOTE — PROGRESS NOTES
Lori Brown is a 74 y.o. female patient.  1. History of total left knee replacement      Past Medical History:   Diagnosis Date    Abdominal aortic aneurysm (AAA) without rupture ([facility]) 02/25/2021    Following with Dr. Abraham Grant 10/31/2018    Asthma     Back ache     reason for taking tramadol    COPD (chronic obstructive pulmonary disease) ([facility])     COPD exacerbation ([facility]) 02/26/2018    Dizziness 11/18/2021    H/O echocardiogram 02/18/2020    EF 50-55%.  Sclerotic, but non-stenotic aortic valve. Moderate AR. Mild TR, MR.     History of nuclear stress test 02/18/2020    EF 75%. This is a normal study.    History of nuclear stress test 08/30/2022    Normal study.    Needs flu shot 09/28/2018    Other chest pain 03/19/2021    PONV (postoperative nausea and vomiting)     Stable angina pectoris ([facility]) 07/08/2021    stable     No past surgical history pertinent negatives on file.  Scheduled Meds:   tiotropium-olodaterol  2 puff Inhalation Daily    montelukast  10 mg Oral Nightly    sodium chloride flush  5-40 mL IntraVENous 2 times per day    acetaminophen  650 mg Oral Q6H    ketorolac  15 mg IntraVENous Q6H    aspirin  325 mg Oral BID    traZODone  100 mg Oral Nightly     Continuous Infusions:   lactated ringers IV soln 100 mL/hr at 04/25/24 0810    sodium chloride       PRN Meds:ipratropium 0.5 mg-albuterol 2.5 mg, sodium chloride flush, sodium chloride, oxyCODONE **OR** oxyCODONE, HYDROmorphone **OR** HYDROmorphone, cyclobenzaprine, ondansetron, albuterol sulfate HFA, tiZANidine, promethazine, promethazine    Allergies   Allergen Reactions    Norco [Hydrocodone-Acetaminophen] Nausea And Vomiting     Principal Problem:    History of total left knee replacement  Resolved Problems:    * No resolved hospital problems. *    Blood pressure 125/61, pulse 82, temperature 97.9 °F (36.6 °C), temperature source Oral, resp. rate 18, height 1.499 m (4' 11\"), weight 59 kg (130 lb), SpO2 92 %, not currently  breastfeeding.    Subjective  Patient seen and examined, resting in bed comfortably, pain controlled, no new complaints.  Discharge was canceled yesterday due to low O2 sat.  Overall she states that she is feeling much better today.  She states that she has been able to walk better with PT and her pain is doing much better.    Objective:  Vital signs (most recent): Blood pressure 125/61, pulse 82, temperature 97.9 °F (36.6 °C), temperature source Oral, resp. rate 18, height 1.499 m (4' 11\"), weight 59 kg (130 lb), SpO2 92 %, not currently breastfeeding.    LLE - Incision clean, dry, intact, no calf TTP, compartments soft, neurovascularly intact distally.    Assessment & Plan  POD #2 L TKA, Doing well postoperatively    Awaiting medical clearance for discharge.  Continue weight bearing as tolerated.  Continue range of motion exercises.  Pain control.  DVT prophylaxis.  Continue to ice and elevate.  Continue PT/OT.  Discharge home once medically cleared.    ROBERTO LANGSTON, DO  4/25/2024

## 2024-04-25 NOTE — PROGRESS NOTES
Pulmonary and Critical Care  Progress Note    Subjective:   The patient is comfortable in bed.  Shortness of breath on exertion.  Chest pain none.  Addressing respiratory complaints Patient is negative for  hemoptysis and cyanosis  CONSTITUTIONAL:  negative for fevers and chills      Past Medical History:     has a past medical history of Abdominal aortic aneurysm (AAA) without rupture (HCC), Anhedonia, Asthma, Back ache, COPD (chronic obstructive pulmonary disease) (HCC), COPD exacerbation (HCC), Dizziness, H/O echocardiogram, History of nuclear stress test, History of nuclear stress test, Needs flu shot, Other chest pain, PONV (postoperative nausea and vomiting), and Stable angina pectoris (HCC).   has a past surgical history that includes Hysterectomy; Appendectomy; Middle ear surgery; Tonsillectomy; and Total knee arthroplasty (Left, 4/23/2024).   reports that she quit smoking about 8 years ago. Her smoking use included cigarettes. She started smoking about 38 years ago. She has a 30.0 pack-year smoking history. She has never used smokeless tobacco. She reports that she does not drink alcohol and does not use drugs.  Family history:  family history includes Asthma in her mother and sister; Cancer in her brother and maternal grandmother; Colon Cancer in her maternal grandfather; Diabetes in her father; Heart Disease in her maternal grandmother; Stroke in her sister.    Allergies   Allergen Reactions    Norco [Hydrocodone-Acetaminophen] Nausea And Vomiting     Social History:    Reviewed; no changes    Objective:   PHYSICAL EXAM:        VITALS:  /61   Pulse 82   Temp 97.9 °F (36.6 °C) (Oral)   Resp 18   Ht 1.499 m (4' 11\")   Wt 59 kg (130 lb)   SpO2 92%   BMI 26.26 kg/m²     24HR INTAKE/OUTPUT:    Intake/Output Summary (Last 24 hours) at 4/25/2024 1017  Last data filed at 4/25/2024 0644  Gross per 24 hour   Intake 10 ml   Output 2350 ml   Net -2340 ml       CONSTITUTIONAL:  awake, alert, cooperative,  no apparent distress, and appears stated age  LUNGS:  decreased breath sounds, occ basilar crackles.  CARDIOVASCULAR:  normal S1 and S2 and negative JVD  ABD:Abdomen soft, non-tender. BS normal. No masses,  No organomegaly  NEURO:Alert and oriented x3. Gait normal. Reflexes and motor strength normal and symmetric. Cranial nerves 2-12 and sensation grossly intact.  DATA:    CBC:  Recent Labs     04/24/24  0945 04/25/24  0440   WBC  --  7.2   RBC  --  3.23*   HGB 11.8* 10.3*   HCT 35.7* 31.4*   PLT  --  177   MCV  --  97.2   MCH  --  31.9*   MCHC  --  32.8   RDW  --  13.2      BMP:  Recent Labs     04/25/24  0440      K 4.3      CO2 25   BUN 12   CREATININE 0.6   CALCIUM 7.9*   GLUCOSE 83      ABG:  No results for input(s): \"PH\", \"PO2ART\", \"EJJ8GAC\", \"HCO3\", \"BEART\", \"O2SAT\" in the last 72 hours.  Lab Results   Component Value Date    PROBNP 129.8 02/27/2018    PROBNP 33.82 02/26/2018    PROBNP 44.28 02/07/2018    THEOPH 4.5 (L) 02/28/2018     No results found for: \"CULTRESP\"    Radiology Review:  Pertinent images / reports were reviewed as a part of this visit.    Assessment:     Patient Active Problem List   Diagnosis    Moderate COPD (chronic obstructive pulmonary disease) (HCC)    Left adrenal mass (HCC)    Chronic midline low back pain without sciatica    Skin mole    Other fatigue    Current moderate episode of major depressive disorder without prior episode (HCC)    Primary insomnia    History of palpitations    Mild intermittent asthma without complication    Personal history of tobacco use    Infrarenal abdominal aortic aneurysm (AAA) without rupture (HCC)    Stable angina pectoris (HCC)    Positive depression screening    Skin lesion    Facial twitching    Facial droop    Restless legs    Chronic bilateral thoracic back pain    Primary osteoarthritis of left knee    History of total left knee replacement       Plan:   1. Overall the patient has improved.  2. Repeat CXR.  3. Home O2 pili [place]  Brayan Tracy MD   4/25/2024  10:17 AM

## 2024-04-25 NOTE — PROGRESS NOTES
Resp called- notified of up coming home O2 Evaluation. Family at bedside notified of needing mothers home O2 evaluation before discharge

## 2024-04-26 DIAGNOSIS — Z96.652 S/P TOTAL KNEE ARTHROPLASTY, LEFT: Primary | ICD-10-CM

## 2024-04-29 ENCOUNTER — TELEPHONE (OUTPATIENT)
Age: 74
End: 2024-04-29

## 2024-04-29 NOTE — TELEPHONE ENCOUNTER
Care Transitions Initial Follow Up Call    Outreach made within 2 business days of discharge: Yes    Patient: Lori Brown Patient : 1950   MRN: [id number]  Reason for Admission:  Left knee replacement   Discharge Date: 24       Spoke with: Lori Tierney department/facility: [facility] Interactive Patient Contact:  Was patient able to fill all prescriptions: Yes  Was patient instructed to bring all medications to the follow-up visit: Yes  Is patient taking all medications as directed in the discharge summary? Yes  Does patient understand their discharge instructions: Yes  Does patient have questions or concerns that need addressed prior to 7-14 day follow up office visit:       patient to call [facility] for Hospital follow up appointment    Scheduled appointment with PCP within 7-14 days    Follow Up  Future Appointments   Date Time Provider Department Center   2024  2:40 PM Michael Whitman PA-C [facility]   2024  2:40 PM Nile Malik DO [facility]   2024  1:30 PM Wilian Tracy MD [facility]   2024  2:40 PM Michael Whitman PA-C [facility]   2025 10:00 AM Fito Crane MD [facility]       Carlos Meza LPN

## 2024-05-02 ENCOUNTER — TELEPHONE (OUTPATIENT)
Dept: ORTHOPEDIC SURGERY | Age: 74
End: 2024-05-02

## 2024-05-02 DIAGNOSIS — Z96.652 S/P TOTAL KNEE ARTHROPLASTY, LEFT: Primary | ICD-10-CM

## 2024-05-03 RX ORDER — OXYCODONE HYDROCHLORIDE AND ACETAMINOPHEN 5; 325 MG/1; MG/1
1 TABLET ORAL EVERY 8 HOURS PRN
Qty: 21 TABLET | Refills: 0 | Status: SHIPPED | OUTPATIENT
Start: 2024-05-03 | End: 2024-05-10

## 2024-05-13 ENCOUNTER — TELEPHONE (OUTPATIENT)
Dept: ORTHOPEDIC SURGERY | Age: 74
End: 2024-05-13

## 2024-05-13 DIAGNOSIS — Z96.652 S/P TOTAL KNEE ARTHROPLASTY, LEFT: Primary | ICD-10-CM

## 2024-05-13 RX ORDER — CYCLOBENZAPRINE HCL 10 MG
10 TABLET ORAL 3 TIMES DAILY PRN
Qty: 30 TABLET | Refills: 0 | Status: SHIPPED | OUTPATIENT
Start: 2024-05-13 | End: 2024-05-23

## 2024-05-13 RX ORDER — OXYCODONE HYDROCHLORIDE 5 MG/1
5 TABLET ORAL EVERY 6 HOURS PRN
Qty: 28 TABLET | Refills: 0 | Status: SHIPPED | OUTPATIENT
Start: 2024-05-13 | End: 2024-05-16 | Stop reason: ALTCHOICE

## 2024-05-13 NOTE — TELEPHONE ENCOUNTER
Patient requesting a refill of pain medication and is requesting a prescription for muscle relaxers

## 2024-05-16 ENCOUNTER — OFFICE VISIT (OUTPATIENT)
Dept: ORTHOPEDIC SURGERY | Age: 74
End: 2024-05-16

## 2024-05-16 VITALS
BODY MASS INDEX: 26.21 KG/M2 | TEMPERATURE: 97.5 F | HEART RATE: 73 BPM | OXYGEN SATURATION: 93 % | WEIGHT: 130 LBS | HEIGHT: 59 IN | RESPIRATION RATE: 14 BRPM

## 2024-05-16 DIAGNOSIS — Z96.652 S/P TOTAL KNEE ARTHROPLASTY, LEFT: Primary | ICD-10-CM

## 2024-05-16 PROCEDURE — 99024 POSTOP FOLLOW-UP VISIT: CPT | Performed by: PHYSICIAN ASSISTANT

## 2024-05-16 RX ORDER — OXYCODONE AND ACETAMINOPHEN 7.5; 325 MG/1; MG/1
1 TABLET ORAL EVERY 6 HOURS PRN
Qty: 28 TABLET | Refills: 0 | Status: SHIPPED | OUTPATIENT
Start: 2024-05-16 | End: 2024-05-23

## 2024-05-16 NOTE — PROGRESS NOTES
Date of surgery:   4/23/2024  Surgeon: Dr. Malik    History:  Ms. Lori Brown is here in follow up regarding her left total knee arthroplasty.  She states that she is having pain at night and having increased pain especially after she overdid it the other day when she went to multiple stores and walked.  She is not having any fever or any difficulty breathing.  She is able to ambulate with a cane.  Home therapy is doing well.    Physical:   Vitals:    05/16/24 1431   Pulse: 73   Resp: 14   Temp: 97.5 °F (36.4 °C)   SpO2: 93%     Left knee incision is well-healed with no erythema, no active drainage.  Minimal edema in the left lower extremity  Range of motion 0-110 degrees  Moderate tenderness to palpation over the distal quadriceps.    Impression: Status post above, doing well       Plan:   Patient Instructions   Continue doing physical therapy  Continue using walker or cane  Continue weight bear as tolerated  May clean incision with soap and water.  May begin to submerge incision in about a week.  Ice and elevate as needed  Tylenol or Motrin for pain  Follow up in 3 weeks with Dr. Nile Malik.    If you have any question post operatively. Please contact office to speak with [facility] Navigator.     We are committed to providing you the best care possible.  If you receive a survey after visiting one of our offices, please take time to share your experience concerning your physician office visit.  These surveys are confidential and no health information about you is shared.  We are eager to improve for you and we are counting on your feedback to help make that happen.

## 2024-05-16 NOTE — PATIENT INSTRUCTIONS
Continue doing physical therapy  Continue using walker or cane  Continue weight bear as tolerated  May clean incision with soap and water.  May begin to submerge incision in about a week.  Ice and elevate as needed  Tylenol or Motrin for pain  Follow up in 3 weeks with Dr. Nile Malik.    If you have any question post operatively. Please contact office to speak with [facility] Navigator.     We are committed to providing you the best care possible.  If you receive a survey after visiting one of our offices, please take time to share your experience concerning your physician office visit.  These surveys are confidential and no health information about you is shared.  We are eager to improve for you and we are counting on your feedback to help make that happen.

## 2024-05-23 DIAGNOSIS — Z96.652 S/P TOTAL KNEE ARTHROPLASTY, LEFT: Primary | ICD-10-CM

## 2024-06-03 ENCOUNTER — TELEPHONE (OUTPATIENT)
Dept: ORTHOPEDIC SURGERY | Age: 74
End: 2024-06-03

## 2024-06-03 DIAGNOSIS — Z96.652 S/P TOTAL KNEE ARTHROPLASTY, LEFT: Primary | ICD-10-CM

## 2024-06-03 NOTE — TELEPHONE ENCOUNTER
Patient called requesting a refill of pain medication. I advised the patient she is 6 weeks post op and she should switch to OTC medication at this time. Pt states she is still having a lot of pain at night. Patient has been using ice, Tylenol and ibuprofen with no relief.

## 2024-06-05 ENCOUNTER — HOSPITAL ENCOUNTER (OUTPATIENT)
Dept: PHYSICAL THERAPY | Age: 74
Setting detail: THERAPIES SERIES
Discharge: HOME | End: 2024-06-05
Payer: MEDICARE

## 2024-06-05 PROCEDURE — 97110 THERAPEUTIC EXERCISES: CPT

## 2024-06-05 PROCEDURE — 97162 PT EVAL MOD COMPLEX 30 MIN: CPT

## 2024-06-05 ASSESSMENT — PAIN SCALES - GENERAL: PAINLEVEL_OUTOF10: 2

## 2024-06-05 ASSESSMENT — PAIN DESCRIPTION - PAIN TYPE: TYPE: SURGICAL PAIN

## 2024-06-05 NOTE — PROGRESS NOTES
Physical Therapy: Initial Evaluation    Patient: Lori Brown (74 y.o. female)   Examination Date: 2024  Plan of Care Certification Period: 2024 to        :  1950 ;    Confirmed: Yes MRN: [id number]  CSN: [id number]   Insurance: Payor: [facility] MEDICARE / Plan: [facility] MEDICARE ADVANTAGE / Product Type: *No Product type* /   Insurance ID: [id number] - (Medicare Managed) Secondary Insurance (if applicable): [employer]   Referring Physician: Nile Malik DO     PCP: Samanta Sinha FNP Visits to Date/Visits Approved:   /      No Show/Cancelled Appts:   /       Medical Diagnosis: S/P total knee arthroplasty, left [Z96.652] L TKA 24  Treatment Diagnosis: L knee stifness     PERTINENT MEDICAL HISTORY           Medical History:     Past Medical History:   Diagnosis Date    Abdominal aortic aneurysm (AAA) without rupture ([facility]) 2021    Following with Dr. Abraham Grant 10/31/2018    Asthma     Back ache     reason for taking tramadol    COPD (chronic obstructive pulmonary disease) ([facility])     COPD exacerbation ([facility]) 2018    Dizziness 2021    H/O echocardiogram 2020    EF 50-55%.  Sclerotic, but non-stenotic aortic valve. Moderate AR. Mild TR, MR.     History of nuclear stress test 2020    EF 75%. This is a normal study.    History of nuclear stress test 2022    Normal study.    Needs flu shot 2018    Other chest pain 2021    PONV (postoperative nausea and vomiting)     Stable angina pectoris ([facility]) 2021    stable     Surgical History:   Past Surgical History:   Procedure Laterality Date    APPENDECTOMY      HYSTERECTOMY (CERVIX STATUS UNKNOWN)      MIDDLE EAR SURGERY      TONSILLECTOMY      TOTAL KNEE ARTHROPLASTY Left 2024    LEFT KNEE TOTAL ARTHROPLASTY performed by Nile Malik DO at [facility] OR       Medications:   Current Outpatient Medications:     aspirin 325 MG tablet, Take 1 tablet by mouth in the morning and at bedtime for

## 2024-06-05 NOTE — FLOWSHEET NOTE
Outpatient Physical Therapy  [place]           [x] Phone: [phone]   Fax: [phone]  [place]           [] Phone: [phone]   Fax: [phone]        Physical Therapy Daily Treatment Note  Date:  2024    Patient Name:  Lori Brown    :  1950  MRN: [id number]  Restrictions/Precautions: Restrictions/Precautions: Weight Bearing     Lower Extremity Weight Bearing Restrictions  Left Lower Extremity Weight Bearing: Weight Bearing As Tolerated  Diagnosis:   S/P total knee arthroplasty, left [Z96.652] Diagnosis: L TKA 24  Date of Injury/Surgery:   Treatment Diagnosis:  L knee stifness  Insurance/Certification information: Medicare advantage- no pre cert/BOMN  Referring Physician:  Nile Malik DO     PCP: Samanta Sinha FNP  Next Doctor Visit:    Plan of care signed (Y/N):    Outcome Measure:   Visit# / total visits: 1  /10  Pain level: 2/10   Goals:     Patient goals: walk without cane, better endurance and strength  Short term goals  Time Frame for Short term goals:                   Long Term Goals  Time Frame for Long Term Goals: 6 weeks plan expires 24  I in home program.  patient will walk 1000  fft in  6 MWT with cane  patient will score 38/80 on LEFS            Summary of Evaluation:  Assessment: TUG 14.45 sec with cane;; LEFS 29/80        Subjective:  See eval         Any changes in Ambulatory Summary Sheet?  None        Objective:  See eval           Exercises: (No more than 4 columns)   Exercise/Equipment Date 24 Date Date           WARM UP         Nustep   Seat 6 /arms 8 L3 x10'     bike Seat x 5'     TABLE      Knee FLEX AAROM With strap x10 reps     SAQ Small roll x15 reps 3 ct     Heel prop X2' small roll                    STANDING      Slant board x1'     Hurdles FWD x 3 laps                                        PROPRIOCEPTION                                    MODALITIES                      Other Therapeutic Activities/Education:        Home Exercise

## 2024-06-05 NOTE — PLAN OF CARE
Outpatient Physical Therapy           [place]           [] Phone: [phone]   Fax: [phone]  [place]           [x] Phone: [phone]   Fax: [phone]     To: Nile Malik DO     From: JOSEPHINE Carty PT,T     Patient: Lori Brown       : 1950  Diagnosis: S/P total knee arthroplasty, left [Z96.652] Diagnosis: L TKA 24  Treatment Diagnosis: L knee stifness  Date: 2024    Physical Therapy Certification/Re-Certification Form    The following patient has been evaluated for physical therapy services and for therapy to continue, insurance requires physician review of the treatment plan initially and every 90 days. Please review the attached evaluation and/or summary of the patient's plan of care, and verify that you agree therapy should continue by signing the attached document and sending it back to our office.    Assessment:    Assessment: TUG 14.45 sec with cane;; LEFS 29/80    Plan of Care/Treatment to date:  [x] Therapeutic Exercise  [] Modalities:  [x] Therapeutic Activity     [] Ultrasound  [] Electrical Stimulation  [x] Gait Training      [] Cervical Traction [] Lumbar Traction  [x] Neuromuscular Re-education    [] Cold/hotpack [] Iontophoresis   [x] Instruction in HEP      [] Vasopneumatic    [] Dry Needling  [x] Manual Therapy               [] Aquatic Therapy       Other:          Frequency/Duration:  # Days per week: [] 1 day # Weeks: [] 1 week [] 5 weeks     [x] 2 days   [] 2 weeks [x] 6 weeks     [] 3 days   [] 3 weeks [] 7 weeks     [] 4 days   [] 4 weeks [] 8 weeks         [] 9 weeks [] 10 weeks         [] 11 weeks [] 12 weeks    Rehab Potential/Progress: [] Excellent [x] Good [] Fair  [] Poor     Goals:    Patient goals: walk without cane, better endurance and strength  Long Term Goals  Time Frame for Long Term Goals: 6 weeks plan expires 24  I in home program.  patient will walk 1000  fft in  6 MWT with cane  patient will score 38/80 on LEFS

## 2024-06-07 RX ORDER — OXYCODONE HYDROCHLORIDE AND ACETAMINOPHEN 5; 325 MG/1; MG/1
1 TABLET ORAL EVERY 8 HOURS PRN
Qty: 21 TABLET | Refills: 0 | Status: SHIPPED | OUTPATIENT
Start: 2024-06-07 | End: 2024-06-14

## 2024-06-11 ENCOUNTER — HOSPITAL ENCOUNTER (OUTPATIENT)
Dept: PHYSICAL THERAPY | Age: 74
Setting detail: THERAPIES SERIES
Discharge: HOME | End: 2024-06-11
Payer: MEDICARE

## 2024-06-11 PROCEDURE — 97530 THERAPEUTIC ACTIVITIES: CPT

## 2024-06-11 PROCEDURE — 97110 THERAPEUTIC EXERCISES: CPT

## 2024-06-11 NOTE — FLOWSHEET NOTE
Outpatient Physical Therapy  [place]           [x] Phone: [phone]   Fax: [phone]  [place]           [] Phone: [phone]   Fax: [phone]        Physical Therapy Daily Treatment Note  Date:  2024    Patient Name:  Lori Brown    :  1950  MRN: [id number]  Restrictions/Precautions: Restrictions/Precautions: Weight Bearing     Lower Extremity Weight Bearing Restrictions  Left Lower Extremity Weight Bearing: Weight Bearing As Tolerated  Diagnosis:   S/P total knee arthroplasty, left [Z96.652] Diagnosis: L TKA 24  Date of Injury/Surgery:   Treatment Diagnosis:  L knee stifness  Insurance/Certification information: Medicare advantage- no pre cert/BOMN  Referring Physician:  Nile Malik DO     PCP: Samanta Sinha FNP  Next Doctor Visit:    Plan of care signed (Y/N):    Outcome Measure:   Visit# / total visits:  2/10  Pain level: 510   Goals:     Patient goals: walk without cane, better endurance and strength  Short term goals  Time Frame for Short term goals:                   Long Term Goals  Time Frame for Long Term Goals: 6 weeks plan expires 24  I in home program.  patient will walk 1000  fft in  6 MWT with cane  patient will score 38/80 on LEFS            Summary of Evaluation:  Assessment: TUG 14.45 sec with cane;; LEFS 29/80        Subjective:  Patient states that her knee is more sore today than it has been.  Rates her pain 5/10.  Has a spot on the top of the incision that is sore to the touch. Has a follow up with the surgeon tomorrow. Slept well in bed for 3 nights and then was tossing and turning a lot last night.  Has been walking short distances around the house without the cane.       Any changes in Ambulatory Summary Sheet?  None        Objective:    AAROM flexion: 118*        Exercises: (No more than 4 columns)   Exercise/Equipment Date 24 Date  2024 Date           WARM UP         Nustep   Seat 6 /arms 8 L3 x10'  Seat 6 /arms 8 L3 x10'

## 2024-06-12 ENCOUNTER — OFFICE VISIT (OUTPATIENT)
Dept: ORTHOPEDIC SURGERY | Age: 74
End: 2024-06-12

## 2024-06-12 VITALS — BODY MASS INDEX: 24.39 KG/M2 | WEIGHT: 121 LBS | HEIGHT: 59 IN

## 2024-06-12 DIAGNOSIS — Z96.652 S/P TOTAL KNEE ARTHROPLASTY, LEFT: Primary | ICD-10-CM

## 2024-06-12 PROCEDURE — 99024 POSTOP FOLLOW-UP VISIT: CPT | Performed by: ORTHOPAEDIC SURGERY

## 2024-06-12 RX ORDER — CYCLOBENZAPRINE HCL 10 MG
10 TABLET ORAL 3 TIMES DAILY PRN
Qty: 30 TABLET | Refills: 0 | Status: SHIPPED | OUTPATIENT
Start: 2024-06-12 | End: 2024-06-22

## 2024-06-12 ASSESSMENT — ENCOUNTER SYMPTOMS
ABDOMINAL PAIN: 0
BACK PAIN: 0
EYE REDNESS: 0
CHEST TIGHTNESS: 0
COLOR CHANGE: 0

## 2024-06-12 NOTE — PROGRESS NOTES
Subjective:      Patient ID: Lori Brown is a 74 y.o. female.    Patient returns to the office today for FU of the left TKA DOS 4/23/24. Pt states overall she is doing well. Pt states she does notice some tenderness and readiness along the proximal incision area. Pt states she does have some increase pain at night when trying to sleep     She comes in today for her 6-week postop recheck.  Overall she states that she is feeling great and her range of motion is doing very well.  She does have some redness at the top of the incision.  Patient denies any new injury to the involved extremity/ joint, denies numbness or tingling in the involved extremity and denies fever or chills.            Review of Systems   Constitutional:  Negative for activity change, chills and fever.   HENT:  Negative for congestion and drooling.    Eyes:  Negative for redness.   Respiratory:  Negative for chest tightness.    Cardiovascular:  Negative for chest pain.   Gastrointestinal:  Negative for abdominal pain.   Endocrine: Negative for cold intolerance and heat intolerance.   Musculoskeletal:  Positive for joint swelling. Negative for arthralgias, back pain, gait problem and myalgias.   Skin:  Negative for color change, pallor, rash and wound.   Neurological:  Negative for weakness and numbness.   Psychiatric/Behavioral:  Negative for confusion.        Past Medical History:   Diagnosis Date    Abdominal aortic aneurysm (AAA) without rupture ([facility]) 02/25/2021    Following with Dr. Abraham Grant 10/31/2018    Asthma     Back ache     reason for taking tramadol    COPD (chronic obstructive pulmonary disease) ([facility])     COPD exacerbation ([facility]) 02/26/2018    Dizziness 11/18/2021    H/O echocardiogram 02/18/2020    EF 50-55%.  Sclerotic, but non-stenotic aortic valve. Moderate AR. Mild TR, MR.     History of nuclear stress test 02/18/2020    EF 75%. This is a normal study.    History of nuclear stress test 08/30/2022    Normal study.

## 2024-06-12 NOTE — PROGRESS NOTES
Patient returns to the office today for FU of the left TKA DOS 4/23/24. Pt states overall she is doing well. Pt states she does notice some tenderness and readiness along the proximal incision area. Pt states she does have some increase pain at night when trying to sleep

## 2024-06-13 ENCOUNTER — HOSPITAL ENCOUNTER (OUTPATIENT)
Dept: PHYSICAL THERAPY | Age: 74
Discharge: HOME | End: 2024-06-13

## 2024-06-13 NOTE — FLOWSHEET NOTE
Physical Therapy  Cancellation/No-show Note  Patient Name:  Lori Brown  :  1950   Date:  2024  Cancelled visits to date: 1  No-shows to date: 0    For today's appointment patient:  [x]  Cancelled  []  Rescheduled appointment  []  No-show     Reason given by patient:  []  Patient ill  []  Conflicting appointment  []  No transportation    []  Conflict with work  []  No reason given  [x]  Other:  Got her schedule mixed up   Comments:      Electronically signed by:  Naheed Tejada PTA

## 2024-07-11 DIAGNOSIS — G89.29 CHRONIC MIDLINE LOW BACK PAIN WITHOUT SCIATICA: ICD-10-CM

## 2024-07-11 DIAGNOSIS — M54.50 CHRONIC MIDLINE LOW BACK PAIN WITHOUT SCIATICA: ICD-10-CM

## 2024-07-11 DIAGNOSIS — Z96.652 S/P TOTAL KNEE ARTHROPLASTY, LEFT: ICD-10-CM

## 2024-07-11 RX ORDER — TRAMADOL HYDROCHLORIDE 50 MG/1
50 TABLET ORAL EVERY 8 HOURS PRN
Qty: 90 TABLET | Refills: 5 | OUTPATIENT
Start: 2024-07-11

## 2024-07-12 RX ORDER — CYCLOBENZAPRINE HCL 10 MG
10 TABLET ORAL 3 TIMES DAILY PRN
Qty: 30 TABLET | Refills: 0 | Status: SHIPPED | OUTPATIENT
Start: 2024-07-12 | End: 2024-07-22

## 2024-07-22 RX ORDER — OXYCODONE AND ACETAMINOPHEN 5; 325 MG/1; MG/1
TABLET ORAL
Qty: 21 TABLET | Refills: 5 | OUTPATIENT
Start: 2024-07-22

## 2024-07-25 ENCOUNTER — OFFICE VISIT (OUTPATIENT)
Dept: ORTHOPEDIC SURGERY | Age: 74
End: 2024-07-25

## 2024-07-25 VITALS
BODY MASS INDEX: 24.19 KG/M2 | WEIGHT: 120 LBS | HEART RATE: 84 BPM | RESPIRATION RATE: 14 BRPM | HEIGHT: 59 IN | OXYGEN SATURATION: 90 %

## 2024-07-25 DIAGNOSIS — Z96.652 PAIN DUE TO TOTAL LEFT KNEE REPLACEMENT, INITIAL ENCOUNTER (HCC): ICD-10-CM

## 2024-07-25 DIAGNOSIS — T84.84XA PAIN DUE TO TOTAL LEFT KNEE REPLACEMENT, INITIAL ENCOUNTER (HCC): ICD-10-CM

## 2024-07-25 DIAGNOSIS — Z96.652 S/P TOTAL KNEE ARTHROPLASTY, LEFT: Primary | ICD-10-CM

## 2024-07-25 PROCEDURE — 99024 POSTOP FOLLOW-UP VISIT: CPT | Performed by: PHYSICIAN ASSISTANT

## 2024-07-25 RX ORDER — OXYCODONE HYDROCHLORIDE AND ACETAMINOPHEN 5; 325 MG/1; MG/1
1 TABLET ORAL 2 TIMES DAILY PRN
Qty: 14 TABLET | Refills: 0 | Status: SHIPPED | OUTPATIENT
Start: 2024-07-25 | End: 2024-08-01

## 2024-07-25 NOTE — PATIENT INSTRUCTIONS
Take medication only at night.  Call office in 2 weeks if you are still having problems regarding the left leg \"restless leg\" and at that point you would probably need to talk to your family doctor to see if there is something you could take for restless leg syndrome.  We could consider gabapentin/Neurontin as well.

## 2024-07-25 NOTE — PROGRESS NOTES
Date of surgery:   4/23/2024  Surgeon: Dr. Malik    History:  Ms. Lori Brown is here in follow up regarding her left total knee arthroplasty.  She states that she is having difficulty at night sleeping and she feels like she has restless leg syndrome.  She tried taking the muscle relaxer that Dr. Dr. Malik gave her and the leg just seems like it wants to jump after that.    Physical:   Vitals:    07/25/24 1441   Pulse: 84   Resp: 14   SpO2: 90%       Left knee incision is well-healed with no erythema, no active drainage.  Range of motion left knee 0-125 degrees.    Imaging studies:  3 views of the left knee taken and reviewed in the office today show cemented left total knee arthroplasty in good position with no signs of loosening or periprosthetic fracture.  No imaging features suggestive of hardware failure.    Impression: Status post above, doing well       Plan:   Patient Instructions   Take medication only at night.  Call office in 2 weeks if you are still having problems regarding the left leg \"restless leg\" and at that point you would probably need to talk to your family doctor to see if there is something you could take for restless leg syndrome.  We could consider gabapentin/Neurontin as well.

## 2024-08-21 ENCOUNTER — OFFICE VISIT (OUTPATIENT)
Age: 74
End: 2024-08-21
Payer: MEDICARE

## 2024-08-21 ENCOUNTER — TELEPHONE (OUTPATIENT)
Age: 74
End: 2024-08-21

## 2024-08-21 VITALS
OXYGEN SATURATION: 96 % | WEIGHT: 123 LBS | DIASTOLIC BLOOD PRESSURE: 70 MMHG | RESPIRATION RATE: 14 BRPM | HEART RATE: 85 BPM | BODY MASS INDEX: 24.84 KG/M2 | SYSTOLIC BLOOD PRESSURE: 130 MMHG

## 2024-08-21 DIAGNOSIS — F51.01 PRIMARY INSOMNIA: ICD-10-CM

## 2024-08-21 DIAGNOSIS — G89.29 CHRONIC MIDLINE LOW BACK PAIN WITHOUT SCIATICA: ICD-10-CM

## 2024-08-21 DIAGNOSIS — M54.50 CHRONIC MIDLINE LOW BACK PAIN WITHOUT SCIATICA: ICD-10-CM

## 2024-08-21 DIAGNOSIS — M25.562 ACUTE PAIN OF LEFT KNEE: ICD-10-CM

## 2024-08-21 DIAGNOSIS — Z12.31 ENCOUNTER FOR SCREENING MAMMOGRAM FOR BREAST CANCER: ICD-10-CM

## 2024-08-21 DIAGNOSIS — Z78.0 POST-MENOPAUSAL: Primary | ICD-10-CM

## 2024-08-21 DIAGNOSIS — Z12.11 COLON CANCER SCREENING: ICD-10-CM

## 2024-08-21 PROCEDURE — 99214 OFFICE O/P EST MOD 30 MIN: CPT

## 2024-08-21 PROCEDURE — 1123F ACP DISCUSS/DSCN MKR DOCD: CPT

## 2024-08-21 RX ORDER — TRAMADOL HYDROCHLORIDE 50 MG/1
50 TABLET ORAL EVERY 8 HOURS PRN
Qty: 90 TABLET | Refills: 0 | Status: SHIPPED | OUTPATIENT
Start: 2024-08-21 | End: 2024-09-20

## 2024-08-21 RX ORDER — TRAMADOL HYDROCHLORIDE 50 MG/1
50 TABLET ORAL EVERY 8 HOURS PRN
Qty: 90 TABLET | Refills: 0 | Status: SHIPPED | OUTPATIENT
Start: 2024-10-20 | End: 2024-11-19

## 2024-08-21 RX ORDER — TRAMADOL HYDROCHLORIDE 50 MG/1
50 TABLET ORAL EVERY 8 HOURS PRN
Qty: 90 TABLET | Refills: 0 | Status: SHIPPED | OUTPATIENT
Start: 2024-09-20 | End: 2024-10-20

## 2024-08-21 SDOH — ECONOMIC STABILITY: FOOD INSECURITY: WITHIN THE PAST 12 MONTHS, THE FOOD YOU BOUGHT JUST DIDN'T LAST AND YOU DIDN'T HAVE MONEY TO GET MORE.: NEVER TRUE

## 2024-08-21 SDOH — ECONOMIC STABILITY: FOOD INSECURITY: WITHIN THE PAST 12 MONTHS, YOU WORRIED THAT YOUR FOOD WOULD RUN OUT BEFORE YOU GOT MONEY TO BUY MORE.: NEVER TRUE

## 2024-08-21 SDOH — ECONOMIC STABILITY: INCOME INSECURITY: HOW HARD IS IT FOR YOU TO PAY FOR THE VERY BASICS LIKE FOOD, HOUSING, MEDICAL CARE, AND HEATING?: NOT HARD AT ALL

## 2024-08-21 ASSESSMENT — PATIENT HEALTH QUESTIONNAIRE - PHQ9
SUM OF ALL RESPONSES TO PHQ QUESTIONS 1-9: 2
6. FEELING BAD ABOUT YOURSELF - OR THAT YOU ARE A FAILURE OR HAVE LET YOURSELF OR YOUR FAMILY DOWN: SEVERAL DAYS
SUM OF ALL RESPONSES TO PHQ QUESTIONS 1-9: 2
7. TROUBLE CONCENTRATING ON THINGS, SUCH AS READING THE NEWSPAPER OR WATCHING TELEVISION: NOT AT ALL
1. LITTLE INTEREST OR PLEASURE IN DOING THINGS: NOT AT ALL
4. FEELING TIRED OR HAVING LITTLE ENERGY: NOT AT ALL
10. IF YOU CHECKED OFF ANY PROBLEMS, HOW DIFFICULT HAVE THESE PROBLEMS MADE IT FOR YOU TO DO YOUR WORK, TAKE CARE OF THINGS AT HOME, OR GET ALONG WITH OTHER PEOPLE: NOT DIFFICULT AT ALL
5. POOR APPETITE OR OVEREATING: NOT AT ALL
SUM OF ALL RESPONSES TO PHQ QUESTIONS 1-9: 2
3. TROUBLE FALLING OR STAYING ASLEEP: SEVERAL DAYS
SUM OF ALL RESPONSES TO PHQ9 QUESTIONS 1 & 2: 0
SUM OF ALL RESPONSES TO PHQ QUESTIONS 1-9: 2
9. THOUGHTS THAT YOU WOULD BE BETTER OFF DEAD, OR OF HURTING YOURSELF: NOT AT ALL
8. MOVING OR SPEAKING SO SLOWLY THAT OTHER PEOPLE COULD HAVE NOTICED. OR THE OPPOSITE, BEING SO FIGETY OR RESTLESS THAT YOU HAVE BEEN MOVING AROUND A LOT MORE THAN USUAL: NOT AT ALL
2. FEELING DOWN, DEPRESSED OR HOPELESS: NOT AT ALL

## 2024-08-21 ASSESSMENT — ENCOUNTER SYMPTOMS
GASTROINTESTINAL NEGATIVE: 1
RESPIRATORY NEGATIVE: 1
BACK PAIN: 1

## 2024-08-21 NOTE — PROGRESS NOTES
She is to decrease the trazodone to 1 tablet for the next 2 weeks. Then if she is not having any SE, she will half the dose to 50 mg for another week. She can stop completely at that point and then we will start the doxepin. During the weaning, please tell her to use melatonin 10 mg for sleep aid. Please have her call into the office once she has completely stopped the trazodone.     6. Acute pain of left knee  - Advised to follow up with Ortho to see if they do any pain relieving creams. Pt to call office and let us know if not and other treatment options will be explored.     Return in about 3 months (around 11/21/2024) for Follow up.           An electronic signature was used to authenticate this note.    --MANUEL Anglin

## 2024-08-21 NOTE — TELEPHONE ENCOUNTER
----- Message from MANUEL Anglin sent at 8/21/2024  3:37 PM EDT -----  Please let pt know that I sent in the tramadol. We will switch her to the doxepin but need to wean her off the trazodone first due to it being the same drug class. She is to decrease the trazodone to 1 tablet for the next 2 weeks. Then if she is not having any SE, she will half the dose to 50 mg or half a tab for another week. She can stop completely at that point and then we will start the doxepin. During the weaning, please tell her to use melatonin 10 mg for sleep aid. Please have her call into the office once she has completely stopped the trazodone.

## 2024-11-04 DIAGNOSIS — J44.9 MODERATE COPD (CHRONIC OBSTRUCTIVE PULMONARY DISEASE) (HCC): ICD-10-CM

## 2024-11-04 RX ORDER — UMECLIDINIUM BROMIDE AND VILANTEROL TRIFENATATE 62.5; 25 UG/1; UG/1
POWDER RESPIRATORY (INHALATION)
Qty: 60 G | Refills: 1 | Status: SHIPPED | OUTPATIENT
Start: 2024-11-04

## 2024-11-04 RX ORDER — ALBUTEROL SULFATE 90 UG/1
INHALANT RESPIRATORY (INHALATION)
Qty: 8.5 G | Refills: 1 | Status: SHIPPED | OUTPATIENT
Start: 2024-11-04

## 2024-12-03 ENCOUNTER — COMMUNITY OUTREACH (OUTPATIENT)
Age: 74
End: 2024-12-03

## 2024-12-13 ENCOUNTER — APPOINTMENT (OUTPATIENT)
Dept: GENERAL RADIOLOGY | Age: 74
End: 2024-12-13
Payer: MEDICARE

## 2024-12-13 ENCOUNTER — HOSPITAL ENCOUNTER (EMERGENCY)
Age: 74
Discharge: HOME | End: 2024-12-13
Attending: EMERGENCY MEDICINE
Payer: MEDICARE

## 2024-12-13 VITALS
HEART RATE: 85 BPM | HEIGHT: 59 IN | OXYGEN SATURATION: 94 % | TEMPERATURE: 98 F | SYSTOLIC BLOOD PRESSURE: 113 MMHG | RESPIRATION RATE: 20 BRPM | DIASTOLIC BLOOD PRESSURE: 99 MMHG | BODY MASS INDEX: 24.8 KG/M2 | WEIGHT: 123 LBS

## 2024-12-13 DIAGNOSIS — R06.00 DYSPNEA, UNSPECIFIED TYPE: ICD-10-CM

## 2024-12-13 DIAGNOSIS — J40 BRONCHITIS: Primary | ICD-10-CM

## 2024-12-13 DIAGNOSIS — R05.9 COUGH, UNSPECIFIED TYPE: ICD-10-CM

## 2024-12-13 DIAGNOSIS — J44.1 COPD EXACERBATION (HCC): ICD-10-CM

## 2024-12-13 LAB
ALBUMIN SERPL-MCNC: 3.9 G/DL (ref 3.4–5)
ALBUMIN/GLOB SERPL: 1.4 {RATIO} (ref 1.1–2.2)
ALP SERPL-CCNC: 86 U/L (ref 40–129)
ALT SERPL-CCNC: 5 U/L (ref 10–40)
ANION GAP SERPL CALCULATED.3IONS-SCNC: 13 MMOL/L (ref 4–16)
AST SERPL-CCNC: 10 U/L (ref 15–37)
BASOPHILS # BLD: 0.03 K/UL
BASOPHILS NFR BLD: 1 % (ref 0–1)
BILIRUB SERPL-MCNC: 0.3 MG/DL (ref 0–1)
BNP SERPL-MCNC: <36 PG/ML (ref 0–449)
BUN SERPL-MCNC: 12 MG/DL (ref 6–23)
CALCIUM SERPL-MCNC: 9.2 MG/DL (ref 8.3–10.6)
CHLORIDE SERPL-SCNC: 103 MMOL/L (ref 99–110)
CO2 SERPL-SCNC: 24 MMOL/L (ref 21–32)
CREAT SERPL-MCNC: 0.6 MG/DL (ref 0.6–1.1)
EOSINOPHIL # BLD: 0.26 K/UL
EOSINOPHILS RELATIVE PERCENT: 4 % (ref 0–3)
ERYTHROCYTE [DISTWIDTH] IN BLOOD BY AUTOMATED COUNT: 12.8 % (ref 11.7–14.9)
GFR, ESTIMATED: >90 ML/MIN/1.73M2
GLUCOSE SERPL-MCNC: 91 MG/DL (ref 70–99)
HCO3 VENOUS: 27.3 MMOL/L (ref 22–29)
HCT VFR BLD AUTO: 41.9 % (ref 37–47)
HGB BLD-MCNC: 14.2 G/DL (ref 12.5–16)
IMM GRANULOCYTES # BLD AUTO: 0.02 K/UL
IMM GRANULOCYTES NFR BLD: 0 %
INFLUENZA A BY PCR: NOT DETECTED
INFLUENZA B BY PCR: NOT DETECTED
LYMPHOCYTES NFR BLD: 1.63 K/UL
LYMPHOCYTES RELATIVE PERCENT: 25 % (ref 24–44)
MCH RBC QN AUTO: 32.3 PG (ref 27–31)
MCHC RBC AUTO-ENTMCNC: 33.9 G/DL (ref 32–36)
MCV RBC AUTO: 95.4 FL (ref 78–100)
MONOCYTES NFR BLD: 0.54 K/UL
MONOCYTES NFR BLD: 8 % (ref 0–4)
NEUTROPHILS NFR BLD: 62 % (ref 36–66)
NEUTS SEG NFR BLD: 3.98 K/UL
O2 SAT, VEN: 57.9 % (ref 34–95)
PCO2 VENOUS: 47.6 MM HG (ref 41–51)
PH VENOUS: 7.37 (ref 7.32–7.43)
PLATELET # BLD AUTO: 229 K/UL (ref 140–440)
PMV BLD AUTO: 9.6 FL (ref 7.5–11.1)
PO2 VENOUS: 31.6 MM HG (ref 28–48)
POSITIVE BASE EXCESS, VEN: 1.2 MMOL/L (ref 0–3)
POTASSIUM SERPL-SCNC: 3.9 MMOL/L (ref 3.5–5.1)
PROT SERPL-MCNC: 6.6 G/DL (ref 6.4–8.2)
RBC # BLD AUTO: 4.39 M/UL (ref 4.2–5.4)
SARS-COV-2 RDRP RESP QL NAA+PROBE: NOT DETECTED
SODIUM SERPL-SCNC: 140 MMOL/L (ref 135–145)
SPECIMEN DESCRIPTION: NORMAL
TCO2 CALC VENOUS: 29 MMOL/L (ref 21–32)
TROPONIN I SERPL HS-MCNC: 10 NG/L (ref 0–13)
WBC OTHER # BLD: 6.5 K/UL (ref 4–10.5)

## 2024-12-13 PROCEDURE — 99285 EMERGENCY DEPT VISIT HI MDM: CPT

## 2024-12-13 PROCEDURE — 96374 THER/PROPH/DIAG INJ IV PUSH: CPT

## 2024-12-13 PROCEDURE — 6360000002 HC RX W HCPCS: Performed by: EMERGENCY MEDICINE

## 2024-12-13 PROCEDURE — 84484 ASSAY OF TROPONIN QUANT: CPT

## 2024-12-13 PROCEDURE — 71045 X-RAY EXAM CHEST 1 VIEW: CPT

## 2024-12-13 PROCEDURE — 2580000003 HC RX 258: Performed by: EMERGENCY MEDICINE

## 2024-12-13 PROCEDURE — 6370000000 HC RX 637 (ALT 250 FOR IP): Performed by: EMERGENCY MEDICINE

## 2024-12-13 PROCEDURE — 80053 COMPREHEN METABOLIC PANEL: CPT

## 2024-12-13 PROCEDURE — 83880 ASSAY OF NATRIURETIC PEPTIDE: CPT

## 2024-12-13 PROCEDURE — 96361 HYDRATE IV INFUSION ADD-ON: CPT

## 2024-12-13 PROCEDURE — 85025 COMPLETE CBC W/AUTO DIFF WBC: CPT

## 2024-12-13 PROCEDURE — 87502 INFLUENZA DNA AMP PROBE: CPT

## 2024-12-13 PROCEDURE — 82803 BLOOD GASES ANY COMBINATION: CPT

## 2024-12-13 PROCEDURE — 87635 SARS-COV-2 COVID-19 AMP PRB: CPT

## 2024-12-13 PROCEDURE — 94664 DEMO&/EVAL PT USE INHALER: CPT

## 2024-12-13 PROCEDURE — 94640 AIRWAY INHALATION TREATMENT: CPT

## 2024-12-13 PROCEDURE — 93005 ELECTROCARDIOGRAM TRACING: CPT | Performed by: EMERGENCY MEDICINE

## 2024-12-13 PROCEDURE — 2500000003 HC RX 250 WO HCPCS: Performed by: EMERGENCY MEDICINE

## 2024-12-13 RX ORDER — BENZONATATE 100 MG/1
100 CAPSULE ORAL ONCE
Status: COMPLETED | OUTPATIENT
Start: 2024-12-13 | End: 2024-12-13

## 2024-12-13 RX ORDER — GUAIFENESIN/DEXTROMETHORPHAN 100-10MG/5
5 SYRUP ORAL 4 TIMES DAILY PRN
Qty: 120 ML | Refills: 0 | Status: SHIPPED | OUTPATIENT
Start: 2024-12-13 | End: 2024-12-23

## 2024-12-13 RX ORDER — IPRATROPIUM BROMIDE AND ALBUTEROL SULFATE 2.5; .5 MG/3ML; MG/3ML
1 SOLUTION RESPIRATORY (INHALATION) ONCE
Status: COMPLETED | OUTPATIENT
Start: 2024-12-13 | End: 2024-12-13

## 2024-12-13 RX ORDER — PREDNISONE 10 MG/1
50 TABLET ORAL DAILY
Qty: 25 TABLET | Refills: 0 | Status: SHIPPED | OUTPATIENT
Start: 2024-12-14 | End: 2024-12-19

## 2024-12-13 RX ORDER — BENZONATATE 100 MG/1
100 CAPSULE ORAL 3 TIMES DAILY PRN
Qty: 10 CAPSULE | Refills: 0 | Status: SHIPPED | OUTPATIENT
Start: 2024-12-13 | End: 2024-12-20

## 2024-12-13 RX ORDER — ALBUTEROL SULFATE 90 UG/1
2 INHALANT RESPIRATORY (INHALATION) EVERY 4 HOURS PRN
Qty: 18 G | Refills: 1 | Status: SHIPPED | OUTPATIENT
Start: 2024-12-13 | End: 2025-01-12

## 2024-12-13 RX ORDER — 0.9 % SODIUM CHLORIDE 0.9 %
1000 INTRAVENOUS SOLUTION INTRAVENOUS ONCE
Status: COMPLETED | OUTPATIENT
Start: 2024-12-13 | End: 2024-12-13

## 2024-12-13 RX ORDER — GUAIFENESIN 200 MG/10ML
200 LIQUID ORAL ONCE
Status: COMPLETED | OUTPATIENT
Start: 2024-12-13 | End: 2024-12-13

## 2024-12-13 RX ORDER — AZITHROMYCIN 250 MG/1
TABLET, FILM COATED ORAL
Qty: 1 PACKET | Refills: 0 | Status: SHIPPED | OUTPATIENT
Start: 2024-12-13 | End: 2024-12-23

## 2024-12-13 RX ADMIN — IPRATROPIUM BROMIDE AND ALBUTEROL SULFATE 1 DOSE: .5; 3 SOLUTION RESPIRATORY (INHALATION) at 12:05

## 2024-12-13 RX ADMIN — GUAIFENESIN 200 MG: 100 LIQUID ORAL at 12:03

## 2024-12-13 RX ADMIN — BENZONATATE 100 MG: 100 CAPSULE ORAL at 12:02

## 2024-12-13 RX ADMIN — SODIUM CHLORIDE 1000 ML: 9 INJECTION, SOLUTION INTRAVENOUS at 12:06

## 2024-12-13 RX ADMIN — METHYLPREDNISOLONE SODIUM SUCCINATE 125 MG: 125 INJECTION INTRAMUSCULAR; INTRAVENOUS at 12:06

## 2024-12-13 ASSESSMENT — ENCOUNTER SYMPTOMS
EYES NEGATIVE: 1
WHEEZING: 1
GASTROINTESTINAL NEGATIVE: 1
COUGH: 1
SHORTNESS OF BREATH: 1
ALLERGIC/IMMUNOLOGIC NEGATIVE: 1

## 2024-12-13 ASSESSMENT — PAIN - FUNCTIONAL ASSESSMENT: PAIN_FUNCTIONAL_ASSESSMENT: NONE - DENIES PAIN

## 2024-12-13 NOTE — ED PROVIDER NOTES
[facility]      TRIAGE CHIEF COMPLAINT:   Shortness of Breath and Cough (Cough and SOB increasing since Sunday.)      [place]:  Lori Brown is a 74 y.o. female that presents with complaint of cough shortness of breath.  Patient has a history of COPD former smoker for the last few days since Monday she been having increased cough congestion shortness of breath wheezing.  Her granddaughter had pneumonia was here yesterday.  Patient has not seen her doctor has been taking some cough drops no fevers nausea vomiting no chest pain abdominal pain just cough shortness of breath.  No other questions or concerns does use her inhalers and breathing treatments..    REVIEW OF SYSTEMS:  At least 10 systems reviewed and otherwise acutely negative except as in the [place].    Review of Systems   Constitutional:  Positive for fatigue.   HENT:  Positive for congestion.    Eyes: Negative.    Respiratory:  Positive for cough, shortness of breath and wheezing.    Cardiovascular: Negative.    Gastrointestinal: Negative.    Endocrine: Negative.    Genitourinary: Negative.    Musculoskeletal: Negative.    Skin: Negative.    Allergic/Immunologic: Negative.    Neurological: Negative.    Hematological: Negative.    Psychiatric/Behavioral: Negative.     All other systems reviewed and are negative.      Past Medical History:   Diagnosis Date    Abdominal aortic aneurysm (AAA) without rupture ([facility]) 02/25/2021    Following with Dr. Abraham Grant 10/31/2018    Asthma     Back ache     reason for taking tramadol    COPD (chronic obstructive pulmonary disease) ([facility])     COPD exacerbation ([facility]) 02/26/2018    Dizziness 11/18/2021    H/O echocardiogram 02/18/2020    EF 50-55%.  Sclerotic, but non-stenotic aortic valve. Moderate AR. Mild TR, MR.     History of nuclear stress test 02/18/2020    EF 75%. This is a normal study.    History of nuclear stress test 08/30/2022    Normal study.    Needs flu shot 09/28/2018     6.6 6.4 - 8.2 g/dL    Albumin 3.9 3.4 - 5.0 g/dL    Albumin/Globulin Ratio 1.4 1.1 - 2.2    Total Bilirubin 0.3 0.0 - 1.0 mg/dL    Alkaline Phosphatase 86 40 - 129 U/L    ALT 5 (L) 10 - 40 U/L    AST 10 (L) 15 - 37 U/L   Troponin   Result Value Ref Range    Troponin, High Sensitivity 10 0 - 13 ng/L   Brain Natriuretic Peptide   Result Value Ref Range    NT Pro-BNP <36 0 - 449 pg/mL   POCT Blood gas, venous   Result Value Ref Range    pH, Reinaldo 7.367 7.32 - 7.43    pCO2, Reinaldo 47.6 41.0 - 51.0 mm Hg    PO2, Reinaldo 31.6 28.0 - 48.0 mm Hg    HCO3, Venous 27.3 22.0 - 29.0 mmol/L    TC02 (Calc), Reinaldo 29 21 - 32 mmol/L    Positive Base Excess, Reinaldo 1.2 0.0 - 3.0 mmol/L    O2 Sat, Reinaldo 57.9 34.0 - 95.0 %        Radiographs (if obtained):  [] The following radiograph was interpreted by myself in the absence of a radiologist:  [x] Radiologist's Report Reviewed:    CXR    EKG (if obtained): (All EKG's are interpreted by myself in the absence of a cardiologist)    12 lead EKG per my interpretation:  Normal Sinus Rhythm 84  Axis is   Normal  QTc is   439  There is no specific T wave changes appreciated.  There is no specific ST wave changes appreciated.    Prior EKG to compare with was not available       Total critical care time today provided was at least 10 minutes. This excludes seperately billable procedure. Critical care time provided for reviewing labs, images, giving breathing treatments, steroids for COPD exacerbation that required close evaluation and/or intervention with concern for patient decompensation.      MDM:    Patient complaint of cough, URI symptoms.  Again patient is been sick since Monday.  Has not seen her doctor has been taking some cough drops she is been having productive cough with clear sputum congestion fatigue malaise.  No fevers nausea vomiting chest pain abdominal pain no swelling.  No history of heart problems.  History of COPD former smoker has been using her inhalers and breathing treatments.  Her

## 2024-12-15 LAB
EKG ATRIAL RATE: 84 BPM
EKG DIAGNOSIS: NORMAL
EKG P AXIS: 70 DEGREES
EKG P-R INTERVAL: 146 MS
EKG Q-T INTERVAL: 372 MS
EKG QRS DURATION: 74 MS
EKG QTC CALCULATION (BAZETT): 439 MS
EKG R AXIS: 33 DEGREES
EKG T AXIS: 63 DEGREES
EKG VENTRICULAR RATE: 84 BPM

## 2024-12-15 PROCEDURE — 93010 ELECTROCARDIOGRAM REPORT: CPT | Performed by: INTERNAL MEDICINE

## 2025-01-10 ENCOUNTER — HOSPITAL ENCOUNTER (INPATIENT)
Age: 75
LOS: 1 days | Discharge: HOME | DRG: 190 | End: 2025-01-11
Attending: STUDENT IN AN ORGANIZED HEALTH CARE EDUCATION/TRAINING PROGRAM | Admitting: INTERNAL MEDICINE
Payer: MEDICARE

## 2025-01-10 ENCOUNTER — APPOINTMENT (OUTPATIENT)
Dept: GENERAL RADIOLOGY | Age: 75
DRG: 190 | End: 2025-01-10
Payer: MEDICARE

## 2025-01-10 DIAGNOSIS — R06.02 SHORTNESS OF BREATH: ICD-10-CM

## 2025-01-10 DIAGNOSIS — J44.9 MODERATE COPD (CHRONIC OBSTRUCTIVE PULMONARY DISEASE) (HCC): ICD-10-CM

## 2025-01-10 DIAGNOSIS — J44.1 COPD EXACERBATION (HCC): Primary | ICD-10-CM

## 2025-01-10 LAB
ALBUMIN SERPL-MCNC: 3.8 G/DL (ref 3.4–5)
ALBUMIN/GLOB SERPL: 1.5 {RATIO} (ref 1.1–2.2)
ALP SERPL-CCNC: 93 U/L (ref 40–129)
ALT SERPL-CCNC: <5 U/L (ref 10–40)
ANION GAP SERPL CALCULATED.3IONS-SCNC: 11 MMOL/L (ref 4–16)
AST SERPL-CCNC: 10 U/L (ref 15–37)
B PARAP IS1001 DNA NPH QL NAA+NON-PROBE: NOT DETECTED
B PERT DNA SPEC QL NAA+PROBE: NOT DETECTED
BASOPHILS # BLD: 0.03 K/UL
BASOPHILS NFR BLD: 1 % (ref 0–1)
BILIRUB SERPL-MCNC: 0.2 MG/DL (ref 0–1)
BNP SERPL-MCNC: <36 PG/ML (ref 0–449)
BUN SERPL-MCNC: 6 MG/DL (ref 6–23)
C PNEUM DNA NPH QL NAA+NON-PROBE: NOT DETECTED
CALCIUM SERPL-MCNC: 8.8 MG/DL (ref 8.3–10.6)
CHLORIDE SERPL-SCNC: 105 MMOL/L (ref 99–110)
CO2 SERPL-SCNC: 22 MMOL/L (ref 21–32)
CREAT SERPL-MCNC: 0.5 MG/DL (ref 0.6–1.1)
EOSINOPHIL # BLD: 0.18 K/UL
EOSINOPHILS RELATIVE PERCENT: 4 % (ref 0–3)
ERYTHROCYTE [DISTWIDTH] IN BLOOD BY AUTOMATED COUNT: 13.1 % (ref 11.7–14.9)
FLUAV RNA NPH QL NAA+NON-PROBE: NOT DETECTED
FLUBV RNA NPH QL NAA+NON-PROBE: NOT DETECTED
GFR, ESTIMATED: >90 ML/MIN/1.73M2
GLUCOSE SERPL-MCNC: 104 MG/DL (ref 70–99)
HADV DNA NPH QL NAA+NON-PROBE: NOT DETECTED
HCO3 VENOUS: 25.1 MMOL/L (ref 22–29)
HCOV 229E RNA NPH QL NAA+NON-PROBE: NOT DETECTED
HCOV HKU1 RNA NPH QL NAA+NON-PROBE: NOT DETECTED
HCOV NL63 RNA NPH QL NAA+NON-PROBE: NOT DETECTED
HCOV OC43 RNA NPH QL NAA+NON-PROBE: NOT DETECTED
HCT VFR BLD AUTO: 37.9 % (ref 37–47)
HGB BLD-MCNC: 12.8 G/DL (ref 12.5–16)
HMPV RNA NPH QL NAA+NON-PROBE: NOT DETECTED
HPIV1 RNA NPH QL NAA+NON-PROBE: NOT DETECTED
HPIV2 RNA NPH QL NAA+NON-PROBE: NOT DETECTED
HPIV3 RNA NPH QL NAA+NON-PROBE: NOT DETECTED
HPIV4 RNA NPH QL NAA+NON-PROBE: NOT DETECTED
IMM GRANULOCYTES # BLD AUTO: 0.02 K/UL
IMM GRANULOCYTES NFR BLD: 0 %
LYMPHOCYTES NFR BLD: 1.38 K/UL
LYMPHOCYTES RELATIVE PERCENT: 27 % (ref 24–44)
M PNEUMO DNA NPH QL NAA+NON-PROBE: NOT DETECTED
MCH RBC QN AUTO: 32 PG (ref 27–31)
MCHC RBC AUTO-ENTMCNC: 33.8 G/DL (ref 32–36)
MCV RBC AUTO: 94.8 FL (ref 78–100)
MONOCYTES NFR BLD: 0.45 K/UL
MONOCYTES NFR BLD: 9 % (ref 0–4)
NEGATIVE BASE EXCESS, VEN: 0.4 MMOL/L (ref 0–3)
NEUTROPHILS NFR BLD: 60 % (ref 36–66)
NEUTS SEG NFR BLD: 3.15 K/UL
O2 SAT, VEN: 76 % (ref 34–95)
PCO2 VENOUS: 43.3 MM HG (ref 41–51)
PH VENOUS: 7.37 (ref 7.32–7.43)
PLATELET # BLD AUTO: 268 K/UL (ref 140–440)
PMV BLD AUTO: 9.3 FL (ref 7.5–11.1)
PO2 VENOUS: 42.2 MM HG (ref 28–48)
POTASSIUM SERPL-SCNC: 3.9 MMOL/L (ref 3.5–5.1)
PROCALCITONIN SERPL-MCNC: 0.03 NG/ML
PROT SERPL-MCNC: 6.3 G/DL (ref 6.4–8.2)
RBC # BLD AUTO: 4 M/UL (ref 4.2–5.4)
RSV RNA NPH QL NAA+NON-PROBE: NOT DETECTED
RV+EV RNA NPH QL NAA+NON-PROBE: NOT DETECTED
SARS-COV-2 RNA NPH QL NAA+NON-PROBE: NOT DETECTED
SODIUM SERPL-SCNC: 138 MMOL/L (ref 135–145)
SPECIMEN DESCRIPTION: NORMAL
TCO2 CALC VENOUS: 26 MMOL/L (ref 21–32)
TROPONIN I SERPL HS-MCNC: 9 NG/L (ref 0–13)
WBC OTHER # BLD: 5.2 K/UL (ref 4–10.5)

## 2025-01-10 PROCEDURE — 83880 ASSAY OF NATRIURETIC PEPTIDE: CPT

## 2025-01-10 PROCEDURE — 84145 PROCALCITONIN (PCT): CPT

## 2025-01-10 PROCEDURE — 99285 EMERGENCY DEPT VISIT HI MDM: CPT

## 2025-01-10 PROCEDURE — 93005 ELECTROCARDIOGRAM TRACING: CPT | Performed by: STUDENT IN AN ORGANIZED HEALTH CARE EDUCATION/TRAINING PROGRAM

## 2025-01-10 PROCEDURE — 96375 TX/PRO/DX INJ NEW DRUG ADDON: CPT

## 2025-01-10 PROCEDURE — 6370000000 HC RX 637 (ALT 250 FOR IP): Performed by: STUDENT IN AN ORGANIZED HEALTH CARE EDUCATION/TRAINING PROGRAM

## 2025-01-10 PROCEDURE — 80053 COMPREHEN METABOLIC PANEL: CPT

## 2025-01-10 PROCEDURE — 96365 THER/PROPH/DIAG IV INF INIT: CPT

## 2025-01-10 PROCEDURE — 94664 DEMO&/EVAL PT USE INHALER: CPT

## 2025-01-10 PROCEDURE — 2500000003 HC RX 250 WO HCPCS: Performed by: PHYSICIAN ASSISTANT

## 2025-01-10 PROCEDURE — 0202U NFCT DS 22 TRGT SARS-COV-2: CPT

## 2025-01-10 PROCEDURE — 2500000003 HC RX 250 WO HCPCS: Performed by: STUDENT IN AN ORGANIZED HEALTH CARE EDUCATION/TRAINING PROGRAM

## 2025-01-10 PROCEDURE — 1200000000 HC SEMI PRIVATE

## 2025-01-10 PROCEDURE — 82803 BLOOD GASES ANY COMBINATION: CPT

## 2025-01-10 PROCEDURE — 71045 X-RAY EXAM CHEST 1 VIEW: CPT

## 2025-01-10 PROCEDURE — 6360000002 HC RX W HCPCS: Performed by: PHYSICIAN ASSISTANT

## 2025-01-10 PROCEDURE — 6370000000 HC RX 637 (ALT 250 FOR IP): Performed by: PHYSICIAN ASSISTANT

## 2025-01-10 PROCEDURE — 94640 AIRWAY INHALATION TREATMENT: CPT

## 2025-01-10 PROCEDURE — 6360000002 HC RX W HCPCS: Performed by: STUDENT IN AN ORGANIZED HEALTH CARE EDUCATION/TRAINING PROGRAM

## 2025-01-10 PROCEDURE — 85025 COMPLETE CBC W/AUTO DIFF WBC: CPT

## 2025-01-10 PROCEDURE — 84484 ASSAY OF TROPONIN QUANT: CPT

## 2025-01-10 RX ORDER — DOXYCYCLINE HYCLATE 100 MG
100 TABLET ORAL ONCE
Status: COMPLETED | OUTPATIENT
Start: 2025-01-10 | End: 2025-01-10

## 2025-01-10 RX ORDER — IPRATROPIUM BROMIDE AND ALBUTEROL SULFATE 2.5; .5 MG/3ML; MG/3ML
1 SOLUTION RESPIRATORY (INHALATION)
Status: DISCONTINUED | OUTPATIENT
Start: 2025-01-10 | End: 2025-01-11 | Stop reason: HOSPADM

## 2025-01-10 RX ORDER — ONDANSETRON 4 MG/1
4 TABLET, ORALLY DISINTEGRATING ORAL EVERY 8 HOURS PRN
Status: DISCONTINUED | OUTPATIENT
Start: 2025-01-10 | End: 2025-01-11 | Stop reason: HOSPADM

## 2025-01-10 RX ORDER — ACETAMINOPHEN 325 MG/1
650 TABLET ORAL EVERY 6 HOURS PRN
Status: DISCONTINUED | OUTPATIENT
Start: 2025-01-10 | End: 2025-01-11 | Stop reason: HOSPADM

## 2025-01-10 RX ORDER — ONDANSETRON 2 MG/ML
4 INJECTION INTRAMUSCULAR; INTRAVENOUS EVERY 6 HOURS PRN
Status: DISCONTINUED | OUTPATIENT
Start: 2025-01-10 | End: 2025-01-11 | Stop reason: HOSPADM

## 2025-01-10 RX ORDER — GUAIFENESIN/DEXTROMETHORPHAN 100-10MG/5
5 SYRUP ORAL EVERY 4 HOURS PRN
Status: DISCONTINUED | OUTPATIENT
Start: 2025-01-10 | End: 2025-01-11 | Stop reason: HOSPADM

## 2025-01-10 RX ORDER — KETOCONAZOLE 20 MG/ML
SHAMPOO, SUSPENSION TOPICAL
Qty: 120 ML | Refills: 0 | Status: SHIPPED | OUTPATIENT
Start: 2025-01-10 | End: 2025-01-10

## 2025-01-10 RX ORDER — POLYETHYLENE GLYCOL 3350 17 G/17G
17 POWDER, FOR SOLUTION ORAL DAILY PRN
Status: DISCONTINUED | OUTPATIENT
Start: 2025-01-10 | End: 2025-01-11 | Stop reason: HOSPADM

## 2025-01-10 RX ORDER — ACETAMINOPHEN 650 MG/1
650 SUPPOSITORY RECTAL EVERY 6 HOURS PRN
Status: DISCONTINUED | OUTPATIENT
Start: 2025-01-10 | End: 2025-01-11 | Stop reason: HOSPADM

## 2025-01-10 RX ORDER — ENOXAPARIN SODIUM 100 MG/ML
40 INJECTION SUBCUTANEOUS DAILY
Status: DISCONTINUED | OUTPATIENT
Start: 2025-01-10 | End: 2025-01-11 | Stop reason: HOSPADM

## 2025-01-10 RX ORDER — ALBUTEROL SULFATE 0.83 MG/ML
2.5 SOLUTION RESPIRATORY (INHALATION)
Status: DISCONTINUED | OUTPATIENT
Start: 2025-01-10 | End: 2025-01-11 | Stop reason: HOSPADM

## 2025-01-10 RX ORDER — MAGNESIUM SULFATE 1 G/100ML
1000 INJECTION INTRAVENOUS ONCE
Status: COMPLETED | OUTPATIENT
Start: 2025-01-10 | End: 2025-01-10

## 2025-01-10 RX ORDER — SODIUM CHLORIDE 0.9 % (FLUSH) 0.9 %
5-40 SYRINGE (ML) INJECTION EVERY 12 HOURS SCHEDULED
Status: DISCONTINUED | OUTPATIENT
Start: 2025-01-10 | End: 2025-01-11 | Stop reason: HOSPADM

## 2025-01-10 RX ORDER — IPRATROPIUM BROMIDE AND ALBUTEROL SULFATE 2.5; .5 MG/3ML; MG/3ML
3 SOLUTION RESPIRATORY (INHALATION) ONCE
Status: COMPLETED | OUTPATIENT
Start: 2025-01-10 | End: 2025-01-10

## 2025-01-10 RX ORDER — SODIUM CHLORIDE 9 MG/ML
INJECTION, SOLUTION INTRAVENOUS PRN
Status: DISCONTINUED | OUTPATIENT
Start: 2025-01-10 | End: 2025-01-11 | Stop reason: HOSPADM

## 2025-01-10 RX ORDER — PREDNISONE 20 MG/1
40 TABLET ORAL DAILY
Status: DISCONTINUED | OUTPATIENT
Start: 2025-01-11 | End: 2025-01-11 | Stop reason: HOSPADM

## 2025-01-10 RX ORDER — TRAZODONE HYDROCHLORIDE 100 MG/1
100 TABLET ORAL NIGHTLY
Status: DISCONTINUED | OUTPATIENT
Start: 2025-01-10 | End: 2025-01-11 | Stop reason: HOSPADM

## 2025-01-10 RX ORDER — GUAIFENESIN 600 MG/1
600 TABLET, EXTENDED RELEASE ORAL 2 TIMES DAILY
Status: DISCONTINUED | OUTPATIENT
Start: 2025-01-10 | End: 2025-01-11 | Stop reason: HOSPADM

## 2025-01-10 RX ORDER — DOXYCYCLINE HYCLATE 100 MG
100 TABLET ORAL 2 TIMES DAILY
Status: DISCONTINUED | OUTPATIENT
Start: 2025-01-11 | End: 2025-01-11 | Stop reason: HOSPADM

## 2025-01-10 RX ORDER — SODIUM CHLORIDE 0.9 % (FLUSH) 0.9 %
5-40 SYRINGE (ML) INJECTION PRN
Status: DISCONTINUED | OUTPATIENT
Start: 2025-01-10 | End: 2025-01-11 | Stop reason: HOSPADM

## 2025-01-10 RX ORDER — BUDESONIDE AND FORMOTEROL FUMARATE DIHYDRATE 80; 4.5 UG/1; UG/1
2 AEROSOL RESPIRATORY (INHALATION)
Status: DISCONTINUED | OUTPATIENT
Start: 2025-01-10 | End: 2025-01-11 | Stop reason: HOSPADM

## 2025-01-10 RX ADMIN — GUAIFENESIN 600 MG: 600 TABLET ORAL at 20:57

## 2025-01-10 RX ADMIN — DOXYCYCLINE HYCLATE 100 MG: 100 TABLET, COATED ORAL at 15:08

## 2025-01-10 RX ADMIN — MAGNESIUM SULFATE HEPTAHYDRATE 1000 MG: 1 INJECTION, SOLUTION INTRAVENOUS at 15:04

## 2025-01-10 RX ADMIN — ENOXAPARIN SODIUM 40 MG: 100 INJECTION SUBCUTANEOUS at 20:58

## 2025-01-10 RX ADMIN — IPRATROPIUM BROMIDE AND ALBUTEROL SULFATE 3 DOSE: .5; 3 SOLUTION RESPIRATORY (INHALATION) at 14:53

## 2025-01-10 RX ADMIN — SODIUM CHLORIDE, PRESERVATIVE FREE 10 ML: 5 INJECTION INTRAVENOUS at 19:52

## 2025-01-10 RX ADMIN — BUDESONIDE AND FORMOTEROL FUMARATE DIHYDRATE 2 PUFF: 80; 4.5 AEROSOL RESPIRATORY (INHALATION) at 19:34

## 2025-01-10 RX ADMIN — IPRATROPIUM BROMIDE AND ALBUTEROL SULFATE 1 DOSE: 2.5; .5 SOLUTION RESPIRATORY (INHALATION) at 19:33

## 2025-01-10 RX ADMIN — WATER 80 MG: 1 INJECTION INTRAMUSCULAR; INTRAVENOUS; SUBCUTANEOUS at 15:07

## 2025-01-10 RX ADMIN — TRAZODONE HYDROCHLORIDE 100 MG: 100 TABLET ORAL at 20:57

## 2025-01-10 RX ADMIN — CEFTRIAXONE SODIUM 1000 MG: 1 INJECTION, POWDER, FOR SOLUTION INTRAMUSCULAR; INTRAVENOUS at 19:50

## 2025-01-10 ASSESSMENT — LIFESTYLE VARIABLES
HOW OFTEN DO YOU HAVE A DRINK CONTAINING ALCOHOL: NEVER
HOW MANY STANDARD DRINKS CONTAINING ALCOHOL DO YOU HAVE ON A TYPICAL DAY: PATIENT DOES NOT DRINK
HOW OFTEN DO YOU HAVE A DRINK CONTAINING ALCOHOL: NEVER
HOW MANY STANDARD DRINKS CONTAINING ALCOHOL DO YOU HAVE ON A TYPICAL DAY: PATIENT DOES NOT DRINK

## 2025-01-10 ASSESSMENT — PAIN - FUNCTIONAL ASSESSMENT: PAIN_FUNCTIONAL_ASSESSMENT: NONE - DENIES PAIN

## 2025-01-10 NOTE — H&P
V2.0  [facility] [place]      Name:  Lori Brown /Age/Sex: 1950  (74 y.o. female)   MRN & CSN:  [id number] & [id number] Encounter Date/Time: 1/10/2025 4:27 PM EST    Location:   PCP: Samanta Sinha FNP       Hospital Day: 1    Assessment and Plan:   Lori Brown is a 74 y.o. female with past medical history of AAA, COPD who presents with shortness of breath.    Acute respiratory failure with hypoxia  COPD Exacerbation   Possible CAP  - presented with dyspnea, cough, wheezing  - CXR with mild left basilar airspace disease  - spO2 88% on RA in ED, currently requiring 2L NC. No home O2 requirement  - VBG non acute  - afebrile without leukocytosis   - check procalcitonin, viral respiratory panel   - continue home inhalers   - start scheduled/PRN nebs, steroids, mucinex, pulmonary hygiene   - Rocephin/doxy given abnormal CXR   - monitor respiratory status, wean O2 as able  -Follow-up with pulmonology on discharge (Dr. Tracy)     Insomnia   - continue trazodone    History of tobacco use   - quit 2 years ago     This patient was discussed with Dr. Higgins. He was agreeable with the assessment and plan as dictated above.     Current Living situation: home  Expected Disposition: same  Estimated D/C: 1-2 days    Diet ADULT DIET; Regular   DVT Prophylaxis [x] Lovenox, []  Heparin, [] SCDs, [] Ambulation,  [] Eliquis, [] Xarelto []Coumadin   Code Status Full Code   Disposition Patient requires continued admission due to hypoxia   Surrogate Decision Maker/ BEV Lugo     Personally reviewed Lab Studies CBC, CMP, trop and Imaging     Discussed management of the case with ER physician, agree with decision to admit patient.        Subjective:     Chief Complaint: Shortness of Breath (Pt to ED via private auto with c/o SOB that started 3-4 days ago, worsening today.  Sp02 92% in triage.  Pt has a hx of COPD and Asthma.  Pt alert, oriented x 4.  RR even, labored.  Skin pink/warm/dry.   PRN  ondansetron, 4 mg, Q8H PRN   Or  ondansetron, 4 mg, Q6H PRN  polyethylene glycol, 17 g, Daily PRN  acetaminophen, 650 mg, Q6H PRN   Or  acetaminophen, 650 mg, Q6H PRN  albuterol, 2.5 mg, Q2H PRN  guaiFENesin-dextromethorphan, 5 mL, Q4H PRN        Labs      Recent Results (from the past 24 hour(s))   EKG 12 Lead    Collection Time: 01/10/25  1:20 PM   Result Value Ref Range    Ventricular Rate 91 BPM    Atrial Rate 91 BPM    P-R Interval 148 ms    QRS Duration 72 ms    Q-T Interval 370 ms    QTc Calculation (Bazett) 455 ms    P Axis 72 degrees    R Axis 23 degrees    T Axis 72 degrees    Diagnosis       Normal sinus rhythm  Normal ECG  When compared with ECG of 13-DEC-2024 11:34,  No significant change was found     CBC with Auto Differential    Collection Time: 01/10/25  1:53 PM   Result Value Ref Range    WBC 5.2 4.0 - 10.5 k/uL    RBC 4.00 (L) 4.20 - 5.40 m/uL    Hemoglobin 12.8 12.5 - 16.0 g/dL    Hematocrit 37.9 37.0 - 47.0 %    MCV 94.8 78.0 - 100.0 fL    MCH 32.0 (H) 27.0 - 31.0 pg    MCHC 33.8 32.0 - 36.0 g/dL    RDW 13.1 11.7 - 14.9 %    Platelets 268 140 - 440 k/uL    MPV 9.3 7.5 - 11.1 fL    Neutrophils % 60 36 - 66 %    Lymphocytes % 27 24 - 44 %    Monocytes % 9 (H) 0 - 4 %    Eosinophils % 4 (H) 0 - 3 %    Basophils % 1 0 - 1 %    Immature Granulocytes % 0 0 %    Neutrophils Absolute 3.15 k/uL    Lymphocytes Absolute 1.38 k/uL    Monocytes Absolute 0.45 k/uL    Eosinophils Absolute 0.18 k/uL    Basophils Absolute 0.03 k/uL    Immature Granulocytes Absolute 0.02 k/uL   Comprehensive Metabolic Panel w/ Reflex to MG    Collection Time: 01/10/25  1:53 PM   Result Value Ref Range    Sodium 138 135 - 145 mmol/L    Potassium 3.9 3.5 - 5.1 mmol/L    Chloride 105 99 - 110 mmol/L    CO2 22 21 - 32 mmol/L    Anion Gap 11 4 - 16 mmol/L    Glucose 104 (H) 70 - 99 mg/dL    BUN 6 6 - 23 mg/dL    Creatinine 0.5 (L) 0.6 - 1.1 mg/dL    Est, Glom Filt Rate >90 >60 mL/min/1.73m2    Calcium 8.8 8.3 - 10.6 mg/dL

## 2025-01-10 NOTE — PROGRESS NOTES
4 Eyes Skin Assessment     NAME:  Lori Brown  YOB: 1950  MEDICAL RECORD NUMBER:  [id number]    The patient is being assessed for  Admission    I agree that at least one RN has performed a thorough Head to Toe Skin Assessment on the patient. ALL assessment sites listed below have been assessed.      Areas assessed by both nurses:    Head, Face, Ears, Shoulders, Back, Chest, Arms, Elbows, Hands, and Sacrum. Buttock, Coccyx, Ischium  NO SKIN ISSUES         Does the Patient have a Wound? No noted wound(s)       Scar Prevention initiated by RN: No  Wound Care Orders initiated by RN: No    Pressure Injury (Stage 3,4, Unstageable, DTI, NWPT, and Complex wounds) if present, place Wound referral order by RN under : No    New Ostomies, if present place, Ostomy referral order under : No     Nurse 1 eSignature: Electronically signed by Missy Alegre RN on 1/10/25 at 6:29 PM EST    **SHARE this note so that the co-signing nurse can place an eSignature**    Nurse 2 eSignature: {Esignature:[id number]}

## 2025-01-10 NOTE — DISCHARGE INSTRUCTIONS
Return to the emergency department if you develop worsening shortness of breath, fevers, chest pain, loss of consciousness.  Your goal oxygen saturation is greater than 90% with rest and ambulation.  Follow-up with your lung doctor promptly.  Finish all steroids antibiotics even if you are feeling better.

## 2025-01-10 NOTE — ED PROVIDER NOTES
0.5(!)   Est, Glom Filt Rate >90   Calcium 8.8   Total Protein 6.3(!)   Albumin 3.8   Albumin/Globulin Ratio 1.5   Total Bilirubin 0.2   Alkaline Phosphatase 93   ALT <5(!)   AST 10(!)  Normal electrolytes, normal renal and hepatic function.  Normal bicarb and anion gap. [SC]   1625 Reassessed the patient, she has significant improvement of her wheezing, but is still persistence of shortness of breath and hypoxia.  She was satting as low as 86% on room air, but averaging around 88%.  Her work of breathing is mildly increased as well.  Performed shared decision making, and agreed on continuing management as inpatient with bronchodilators.  Consulted internal medicine for admission. [SC]      ED Course User Index  [SC] Gorge Robertson MD         History from : Patient and Family      Limitations to history : None    Patient was given the following medications:  Medications - No data to display    Social Determinants : None    Records Reviewed : Outpatient Notes   and Outpatient Labs & Studies        Disposition: Admitted    I am the Primary Clinician of Record.    Clinical Impression:  1. COPD exacerbation (HCC)    2. Shortness of breath    3. Moderate COPD (chronic obstructive pulmonary disease) (HCC)      Disposition referral (if applicable):  Samanta Sinha, MANUEL  [address] [facility] [address] [phone]    Schedule an appointment as soon as possible for a visit in 1 week  As needed    Wilian Tracy MD  [address] [facility] [address] [phone]    Schedule an appointment as soon as possible for a visit      Disposition medications (if applicable):  Discharge Medication List as of 1/11/2025  4:02 PM        START taking these medications    Details   predniSONE (DELTASONE) 20 MG tablet Take 2 tablets by mouth daily for 4 doses, Disp-8 tablet, R-0Normal      guaiFENesin (MUCINEX) 600 MG extended release tablet Take 1 tablet by mouth 2 times daily, Disp-14 tablet, R-0Normal       doxycycline hyclate (VIBRA-TABS) 100 MG tablet Take 1 tablet by mouth 2 times daily for 8 doses, Disp-8 tablet, R-0Normal      cefdinir (OMNICEF) 300 MG capsule Take 1 capsule by mouth 2 times daily for 4 days, Disp-8 capsule, R-0Normal           ED Provider Disposition Time  DISPOSITION Admitted 01/10/2025 05:00:06 PM               Comment: Please note this report has been produced using speech recognition software and may contain errors related to that system including errors in grammar, punctuation, and spelling, as well as words and phrases that may be inappropriate.  Efforts were made to edit the dictations.        Gorge Robertson MD  01/12/25 0222

## 2025-01-11 VITALS
BODY MASS INDEX: 25.4 KG/M2 | HEIGHT: 59 IN | DIASTOLIC BLOOD PRESSURE: 45 MMHG | TEMPERATURE: 98.1 F | HEART RATE: 86 BPM | OXYGEN SATURATION: 90 % | WEIGHT: 126 LBS | SYSTOLIC BLOOD PRESSURE: 122 MMHG | RESPIRATION RATE: 16 BRPM

## 2025-01-11 LAB
BASOPHILS # BLD: 0.01 K/UL
BASOPHILS NFR BLD: 0 % (ref 0–1)
EKG ATRIAL RATE: 91 BPM
EKG DIAGNOSIS: NORMAL
EKG P AXIS: 72 DEGREES
EKG P-R INTERVAL: 148 MS
EKG Q-T INTERVAL: 370 MS
EKG QRS DURATION: 72 MS
EKG QTC CALCULATION (BAZETT): 455 MS
EKG R AXIS: 23 DEGREES
EKG T AXIS: 72 DEGREES
EKG VENTRICULAR RATE: 91 BPM
EOSINOPHIL # BLD: 0.01 K/UL
EOSINOPHILS RELATIVE PERCENT: 0 % (ref 0–3)
ERYTHROCYTE [DISTWIDTH] IN BLOOD BY AUTOMATED COUNT: 13.1 % (ref 11.7–14.9)
HCT VFR BLD AUTO: 36.4 % (ref 37–47)
HGB BLD-MCNC: 12.2 G/DL (ref 12.5–16)
IMM GRANULOCYTES # BLD AUTO: 0.02 K/UL
IMM GRANULOCYTES NFR BLD: 0 %
LYMPHOCYTES NFR BLD: 0.86 K/UL
LYMPHOCYTES RELATIVE PERCENT: 15 % (ref 24–44)
MCH RBC QN AUTO: 31.6 PG (ref 27–31)
MCHC RBC AUTO-ENTMCNC: 33.5 G/DL (ref 32–36)
MCV RBC AUTO: 94.3 FL (ref 78–100)
MONOCYTES NFR BLD: 0.41 K/UL
MONOCYTES NFR BLD: 7 % (ref 0–4)
NEUTROPHILS NFR BLD: 77 % (ref 36–66)
NEUTS SEG NFR BLD: 4.35 K/UL
PLATELET # BLD AUTO: 257 K/UL (ref 140–440)
PMV BLD AUTO: 9.5 FL (ref 7.5–11.1)
RBC # BLD AUTO: 3.86 M/UL (ref 4.2–5.4)
WBC OTHER # BLD: 5.7 K/UL (ref 4–10.5)

## 2025-01-11 PROCEDURE — 94640 AIRWAY INHALATION TREATMENT: CPT

## 2025-01-11 PROCEDURE — 6370000000 HC RX 637 (ALT 250 FOR IP): Performed by: PHYSICIAN ASSISTANT

## 2025-01-11 PROCEDURE — 94761 N-INVAS EAR/PLS OXIMETRY MLT: CPT

## 2025-01-11 PROCEDURE — 6360000002 HC RX W HCPCS: Performed by: PHYSICIAN ASSISTANT

## 2025-01-11 PROCEDURE — 93010 ELECTROCARDIOGRAM REPORT: CPT | Performed by: INTERNAL MEDICINE

## 2025-01-11 PROCEDURE — 2700000000 HC OXYGEN THERAPY PER DAY

## 2025-01-11 PROCEDURE — 36415 COLL VENOUS BLD VENIPUNCTURE: CPT

## 2025-01-11 PROCEDURE — 85025 COMPLETE CBC W/AUTO DIFF WBC: CPT

## 2025-01-11 PROCEDURE — 2500000003 HC RX 250 WO HCPCS: Performed by: PHYSICIAN ASSISTANT

## 2025-01-11 RX ORDER — ALBUTEROL SULFATE 90 UG/1
2 INHALANT RESPIRATORY (INHALATION) EVERY 4 HOURS PRN
Qty: 18 G | Refills: 1 | Status: SHIPPED | OUTPATIENT
Start: 2025-01-11 | End: 2025-02-10

## 2025-01-11 RX ORDER — CEFDINIR 300 MG/1
300 CAPSULE ORAL 2 TIMES DAILY
Qty: 8 CAPSULE | Refills: 0 | Status: SHIPPED | OUTPATIENT
Start: 2025-01-11 | End: 2025-01-15

## 2025-01-11 RX ORDER — DOXYCYCLINE HYCLATE 100 MG
100 TABLET ORAL 2 TIMES DAILY
Qty: 8 TABLET | Refills: 0 | Status: SHIPPED | OUTPATIENT
Start: 2025-01-11 | End: 2025-01-15

## 2025-01-11 RX ORDER — PREDNISONE 20 MG/1
40 TABLET ORAL DAILY
Qty: 8 TABLET | Refills: 0 | Status: SHIPPED | OUTPATIENT
Start: 2025-01-12 | End: 2025-01-16

## 2025-01-11 RX ORDER — GUAIFENESIN 600 MG/1
600 TABLET, EXTENDED RELEASE ORAL 2 TIMES DAILY
Qty: 14 TABLET | Refills: 0 | Status: SHIPPED | OUTPATIENT
Start: 2025-01-11

## 2025-01-11 RX ORDER — IPRATROPIUM BROMIDE AND ALBUTEROL SULFATE 2.5; .5 MG/3ML; MG/3ML
1 SOLUTION RESPIRATORY (INHALATION) 4 TIMES DAILY PRN
Qty: 30 EACH | Refills: 0 | Status: SHIPPED | OUTPATIENT
Start: 2025-01-11

## 2025-01-11 RX ORDER — NAPROXEN 500 MG/1
250 TABLET ORAL 2 TIMES DAILY PRN
Status: DISCONTINUED | OUTPATIENT
Start: 2025-01-11 | End: 2025-01-11 | Stop reason: HOSPADM

## 2025-01-11 RX ADMIN — ENOXAPARIN SODIUM 40 MG: 100 INJECTION SUBCUTANEOUS at 09:20

## 2025-01-11 RX ADMIN — GUAIFENESIN 600 MG: 600 TABLET ORAL at 09:20

## 2025-01-11 RX ADMIN — IPRATROPIUM BROMIDE AND ALBUTEROL SULFATE 1 DOSE: 2.5; .5 SOLUTION RESPIRATORY (INHALATION) at 07:45

## 2025-01-11 RX ADMIN — ALBUTEROL SULFATE 2.5 MG: 2.5 SOLUTION RESPIRATORY (INHALATION) at 03:07

## 2025-01-11 RX ADMIN — BUDESONIDE AND FORMOTEROL FUMARATE DIHYDRATE 2 PUFF: 80; 4.5 AEROSOL RESPIRATORY (INHALATION) at 07:45

## 2025-01-11 RX ADMIN — DOXYCYCLINE HYCLATE 100 MG: 100 TABLET, COATED ORAL at 04:23

## 2025-01-11 RX ADMIN — IPRATROPIUM BROMIDE AND ALBUTEROL SULFATE 1 DOSE: 2.5; .5 SOLUTION RESPIRATORY (INHALATION) at 11:40

## 2025-01-11 RX ADMIN — SODIUM CHLORIDE, PRESERVATIVE FREE 10 ML: 5 INJECTION INTRAVENOUS at 09:21

## 2025-01-11 RX ADMIN — PREDNISONE 40 MG: 20 TABLET ORAL at 09:20

## 2025-01-11 NOTE — DISCHARGE SUMMARY
V2.0  Discharge Summary    Name:  Lori Brown /Age/Sex: 1950 (74 y.o. female)   Admit Date: 1/10/2025  Discharge Date: 25    MRN & CSN:  [id number] & [id number] Encounter Date and Time 25 3:41 PM EST    Attending:  Fran Higgins MD Discharging Provider: Aracely Hemphill PA-C       Hospital Course:     Brief HPI: Lori Brown is a 74 y.o. female who presented with COPD exacerbation.    Problems addressed during this hospitalization:     Acute respiratory failure with hypoxia  COPD Exacerbation   Possible CAP  - presented with dyspnea, cough, wheezing  - CXR with mild left basilar airspace disease  - spO2 88% on RA in ED, currently requiring 2L NC. No home O2 requirement  - VBG non acute  - afebrile without leukocytosis   - RVP negative  - continue home inhalers   - continue scheduled/PRN nebs, steroids, mucinex, pulmonary hygiene   - despite intermittent hypoxia, patient requesting to be discharged. Did explain she would likely benefit from another night in the hospital to monitor oxygen levels, but patient persistent that she needs to go home. Patient ambulated unit with minimal dyspnea. Qualified for home O2 which was arranged on discharge. She has required home O2 during an acute illness in the past. Return precautions discussed.   - continue steroids and antibiotics x5 days  -Follow-up with PCP and pulmonology on discharge (Dr. Tracy)      Insomnia   - continue trazodone     History of tobacco use   - quit 2 years ago       This patient was discussed in conjunction with Dr. Higgins. He was agreeable with patient's plan at discharge as dictated above.     The patient expressed appropriate understanding of, and agreement with the discharge recommendations, medications, and plan.     Consults this admission:  None    Discharge Diagnosis:   COPD exacerbation (HCC)    Discharge Instruction:   Follow up appointments: PCP, pulmonology   Primary care physician: Samanta Sinha FNP within  to review them with you.                   Where to Get Your Medications        These medications were sent to [facility] [place], [place] - [address] - P [phone] - F [phone]  [address] [address]      Phone: [phone]   albuterol sulfate  (90 Base) MCG/ACT inhaler  cefdinir 300 MG capsule  doxycycline hyclate 100 MG tablet  guaiFENesin 600 MG extended release tablet  ipratropium 0.5 mg-albuterol 2.5 mg 0.5-2.5 (3) MG/3ML Soln nebulizer solution  predniSONE 20 MG tablet        Objective Findings at Discharge:   BP (!) 122/45   Pulse 86   Temp 98.1 °F (36.7 °C) (Oral)   Resp 16   Ht 1.499 m (4' 11\")   Wt 57.2 kg (126 lb)   SpO2 90%   BMI 25.45 kg/m²       Physical Exam:   General: NAD  Eyes: EOMI  ENT: neck supple  Cardiovascular: Regular rate.  Respiratory: scattered expiratory wheeze and rhonchi, respirations even and unlabored on 2L NC  Gastrointestinal: Abdomen soft, non tender  Genitourinary: no suprapubic tenderness  Musculoskeletal: No edema  Skin: warm, dry  Neuro: Alert.  Psych: Mood appropriate.         Labs and Imaging   XR CHEST PORTABLE    Result Date: 1/10/2025  EXAM:  XR CHEST PORTABLE DATE OF EXAM:  1/10/2025 15:05 DEMOGRAPHICS: 74 years old Female CLINICAL STATEMENT: Shortness of rbeath COMPARISON: None available. FINDINGS: Normal heart, elvia, and mediastinum. Atherosclerotic aorta. Mild left basilar airspace disease versus atelectasis. Hyperinflated lungs. Bones unremarkable. IMPRESSION: 1. Hyperinflated lungs. 2. Mild left basilar airspace disease versus atelectasis.  Dictated and Electronically Signed By: Elijah Lawson 1/10/2025 14:18          CBC:   Recent Labs     01/10/25  1353 01/11/25  0430   WBC 5.2 5.7   HGB 12.8 12.2*    257     BMP:    Recent Labs     01/10/25  1353      K 3.9      CO2 22   BUN 6   CREATININE 0.5*   GLUCOSE 104*     Hepatic:   Recent Labs     01/10/25  1353   AST 10*   ALT <5*   BILITOT 0.2

## 2025-01-11 NOTE — PLAN OF CARE
Problem: Safety - Adult  Goal: Free from fall injury  1/11/2025 1547 by Erica Edward RN  Outcome: Completed  1/11/2025 1053 by Erica Edward RN  Outcome: Progressing  1/11/2025 0227 by Kelin Mrak RN  Outcome: Progressing     Problem: Respiratory - Adult  Goal: Achieves optimal ventilation and oxygenation  1/11/2025 1547 by Erica Edward RN  Outcome: Completed  1/11/2025 1053 by Erica Edward RN  Outcome: Progressing  1/11/2025 0227 by Kelin Mark RN  Outcome: Progressing     Problem: Infection - Adult  Goal: Absence of infection at discharge  1/11/2025 1547 by Erica Edward RN  Outcome: Completed  1/11/2025 1053 by Erica Edward RN  Outcome: Progressing  1/11/2025 0227 by Kelin Mark RN  Outcome: Progressing  Goal: Absence of infection during hospitalization  1/11/2025 1547 by Erica Edward RN  Outcome: Completed  1/11/2025 1053 by Erica Edward RN  Outcome: Progressing  1/11/2025 0227 by Kelin Mark RN  Outcome: Progressing  Goal: Absence of fever/infection during anticipated neutropenic period  1/11/2025 1547 by Erica Edward RN  Outcome: Completed  1/11/2025 1053 by Erica Edward RN  Outcome: Progressing  1/11/2025 0227 by Kelin Mark RN  Outcome: Progressing     Problem: Discharge Planning  Goal: Discharge to home or other facility with appropriate resources  1/11/2025 1547 by Erica Edward RN  Outcome: Completed  1/11/2025 1053 by Erica Edward RN  Outcome: Progressing  1/11/2025 0227 by Kelin Mark RN  Outcome: Progressing

## 2025-01-11 NOTE — PROGRESS NOTES
V2.0  [facility] Hospitalist Progress Note      Name:  Lori Brown /Age/Sex: 1950  (74 y.o. female)   MRN & CSN:  [id number] & [id number] Encounter Date/Time: 2025 11:17 AM EST    Location:   PCP: Samanta Sinha FNP       Hospital Day: 2    Assessment and Plan:   Lori Brown is a 74 y.o. female who presents with COPD exacerbation (HCC)    Acute respiratory failure with hypoxia  COPD Exacerbation   Possible CAP  - presented with dyspnea, cough, wheezing  - CXR with mild left basilar airspace disease  - spO2 88% on RA in ED, currently requiring 2L NC. No home O2 requirement  - VBG non acute  - afebrile without leukocytosis   - RVP negative  - continue home inhalers   - continue scheduled/PRN nebs, steroids, mucinex, pulmonary hygiene   - Rocephin/doxy given abnormal CXR   - monitor respiratory status, wean O2 as able. Possible discharge later today if weaned to RA.   -Follow-up with pulmonology on discharge (Dr. Tracy)      Insomnia   - continue trazodone     History of tobacco use   - quit 2 years ago     This patient was discussed with Dr. Higgins. He was agreeable with the assessment and plan as dictated above.     Current Living situation: home  Expected Disposition: same  Estimated D/C: 1-2 days    Diet ADULT DIET; Regular   DVT Prophylaxis [x] Lovenox, []  Heparin, [] SCDs, [] Ambulation,  [] Eliquis, [] Xarelto []Coumadin   Code Status Full Code   Disposition Patient requires continued admission due to hypoxia   Surrogate Decision Maker/ POARACELI Lugo     Personally reviewed Lab Studies and Imaging         Subjective:     Chief Complaint: Shortness of Breath (Pt to ED via private auto with c/o SOB that started 3-4 days ago, worsening today.  Sp02 92% in triage.  Pt has a hx of COPD and Asthma.  Pt alert, oriented x 4.  RR even, labored.  Skin pink/warm/dry.  Ambulatory to room.  Family at bedside.)     Patient seen and examined at bedside.  Still having some shortness of

## 2025-01-11 NOTE — PROGRESS NOTES
RESP CARE HOME 02 EVAL: PT ON ROOM AIR @ REST Sa02 90% AMBULATED IN HALLWAY ON ROOM AIR Sa02 88% PLACED ON 2LNC AMBULATED AGAIN Sa02 93%

## 2025-01-11 NOTE — PLAN OF CARE
Problem: Safety - Adult  Goal: Free from fall injury  Outcome: Progressing     Problem: Respiratory - Adult  Goal: Achieves optimal ventilation and oxygenation  Outcome: Progressing     Problem: Infection - Adult  Goal: Absence of infection at discharge  Outcome: Progressing  Goal: Absence of infection during hospitalization  Outcome: Progressing  Goal: Absence of fever/infection during anticipated neutropenic period  Outcome: Progressing     Problem: Discharge Planning  Goal: Discharge to home or other facility with appropriate resources  Outcome: Progressing

## 2025-01-11 NOTE — PLAN OF CARE
Problem: Safety - Adult  Goal: Free from fall injury  1/11/2025 1053 by Erica Edward RN  Outcome: Progressing  1/11/2025 0227 by Kelin Mark RN  Outcome: Progressing     Problem: Respiratory - Adult  Goal: Achieves optimal ventilation and oxygenation  1/11/2025 1053 by Erica Edward RN  Outcome: Progressing  1/11/2025 0227 by Kelin Mark RN  Outcome: Progressing     Problem: Infection - Adult  Goal: Absence of infection at discharge  1/11/2025 1053 by Erica Edward RN  Outcome: Progressing  1/11/2025 0227 by Kelin Mark RN  Outcome: Progressing  Goal: Absence of infection during hospitalization  1/11/2025 1053 by Erica Edward RN  Outcome: Progressing  1/11/2025 0227 by Kelin Mark RN  Outcome: Progressing  Goal: Absence of fever/infection during anticipated neutropenic period  1/11/2025 1053 by Erica Edward RN  Outcome: Progressing  1/11/2025 0227 by Kelin Mark RN  Outcome: Progressing     Problem: Discharge Planning  Goal: Discharge to home or other facility with appropriate resources  1/11/2025 1053 by Erica Edward RN  Outcome: Progressing  1/11/2025 0227 by Kelin Mark RN  Outcome: Progressing

## 2025-01-11 NOTE — PROGRESS NOTES
Discharge instructions explained to pt, no questions or concerns at this time. Pt left at 1615 with significant other. Peripheral Iv removed.  Pt discharged with home oxygen, 2L NC.    Pt to follow-up with PCP and pulmonology.

## 2025-01-11 NOTE — PROGRESS NOTES
This RN has reviewed and agrees with SHER Toribio LPN's data collection and has collaborated with this LPN regarding the patient's care plan.

## 2025-01-30 ENCOUNTER — OFFICE VISIT (OUTPATIENT)
Age: 75
End: 2025-01-30
Payer: MEDICARE

## 2025-01-30 VITALS
BODY MASS INDEX: 24.76 KG/M2 | WEIGHT: 122.8 LBS | OXYGEN SATURATION: 94 % | HEART RATE: 70 BPM | DIASTOLIC BLOOD PRESSURE: 68 MMHG | SYSTOLIC BLOOD PRESSURE: 126 MMHG | HEIGHT: 59 IN | RESPIRATION RATE: 18 BRPM

## 2025-01-30 DIAGNOSIS — G89.29 CHRONIC MIDLINE LOW BACK PAIN WITHOUT SCIATICA: Primary | ICD-10-CM

## 2025-01-30 DIAGNOSIS — M54.50 CHRONIC MIDLINE LOW BACK PAIN WITHOUT SCIATICA: Primary | ICD-10-CM

## 2025-01-30 PROCEDURE — 99214 OFFICE O/P EST MOD 30 MIN: CPT

## 2025-01-30 PROCEDURE — 1159F MED LIST DOCD IN RCRD: CPT

## 2025-01-30 PROCEDURE — 1160F RVW MEDS BY RX/DR IN RCRD: CPT

## 2025-01-30 PROCEDURE — 1123F ACP DISCUSS/DSCN MKR DOCD: CPT

## 2025-01-30 RX ORDER — TRAMADOL HYDROCHLORIDE 50 MG/1
50 TABLET ORAL EVERY 8 HOURS PRN
Qty: 90 TABLET | Refills: 0 | Status: SHIPPED | OUTPATIENT
Start: 2025-01-30 | End: 2025-03-01

## 2025-01-30 RX ORDER — TRAMADOL HYDROCHLORIDE 50 MG/1
50 TABLET ORAL EVERY 8 HOURS PRN
Qty: 90 TABLET | Refills: 0 | Status: SHIPPED | OUTPATIENT
Start: 2025-03-01 | End: 2025-03-31

## 2025-01-30 RX ORDER — TRAMADOL HYDROCHLORIDE 50 MG/1
50 TABLET ORAL EVERY 8 HOURS PRN
COMMUNITY

## 2025-01-30 RX ORDER — TRAMADOL HYDROCHLORIDE 50 MG/1
50 TABLET ORAL EVERY 8 HOURS PRN
Status: CANCELLED | OUTPATIENT
Start: 2025-01-30

## 2025-01-30 RX ORDER — TRAMADOL HYDROCHLORIDE 50 MG/1
50 TABLET ORAL EVERY 8 HOURS PRN
Qty: 90 TABLET | Refills: 0 | Status: SHIPPED | OUTPATIENT
Start: 2025-03-31 | End: 2025-04-30

## 2025-01-30 ASSESSMENT — PATIENT HEALTH QUESTIONNAIRE - PHQ9
SUM OF ALL RESPONSES TO PHQ9 QUESTIONS 1 & 2: 0
9. THOUGHTS THAT YOU WOULD BE BETTER OFF DEAD, OR OF HURTING YOURSELF: NOT AT ALL
SUM OF ALL RESPONSES TO PHQ QUESTIONS 1-9: 1
SUM OF ALL RESPONSES TO PHQ QUESTIONS 1-9: 1
6. FEELING BAD ABOUT YOURSELF - OR THAT YOU ARE A FAILURE OR HAVE LET YOURSELF OR YOUR FAMILY DOWN: NOT AT ALL
1. LITTLE INTEREST OR PLEASURE IN DOING THINGS: NOT AT ALL
SUM OF ALL RESPONSES TO PHQ QUESTIONS 1-9: 1
5. POOR APPETITE OR OVEREATING: NOT AT ALL
8. MOVING OR SPEAKING SO SLOWLY THAT OTHER PEOPLE COULD HAVE NOTICED. OR THE OPPOSITE, BEING SO FIGETY OR RESTLESS THAT YOU HAVE BEEN MOVING AROUND A LOT MORE THAN USUAL: NOT AT ALL
10. IF YOU CHECKED OFF ANY PROBLEMS, HOW DIFFICULT HAVE THESE PROBLEMS MADE IT FOR YOU TO DO YOUR WORK, TAKE CARE OF THINGS AT HOME, OR GET ALONG WITH OTHER PEOPLE: SOMEWHAT DIFFICULT
3. TROUBLE FALLING OR STAYING ASLEEP: NOT AT ALL
SUM OF ALL RESPONSES TO PHQ QUESTIONS 1-9: 1
7. TROUBLE CONCENTRATING ON THINGS, SUCH AS READING THE NEWSPAPER OR WATCHING TELEVISION: SEVERAL DAYS
4. FEELING TIRED OR HAVING LITTLE ENERGY: NOT AT ALL
2. FEELING DOWN, DEPRESSED OR HOPELESS: NOT AT ALL

## 2025-01-30 ASSESSMENT — ENCOUNTER SYMPTOMS
BACK PAIN: 1
RESPIRATORY NEGATIVE: 1
GASTROINTESTINAL NEGATIVE: 1

## 2025-01-30 NOTE — PATIENT INSTRUCTIONS
We are committed to providing you the best care possible.    If you receive a survey after visiting one of our offices, please take time to share your experience concerning your physician office visit.  These surveys are confidential and no health information about you is shared.    We are eager to improve for you and continue to give you satisfactory care, we are counting on your feedback to help make that happen.            Welcome to [facility]:    Did you know we now have a faster way for you to move through your appointment? For your convenience, we now have digital registration available. When you schedule your next appointment, you will receive a link via your email as well as a text message that will allow you to complete any paperwork digitally before your appointment.

## 2025-01-30 NOTE — PROGRESS NOTES
Lori Brown (:  1950) is a 74 y.o. female,Established patient, here for evaluation of the following chief complaint(s):  Follow-up (Needs mediation refilled )      Subjective   Pt here for routine follow up. She has been hospitalized recently due to COPD exacerbation. Pt currently following with Pulmonology. On ABT and steroids and reports feeling better.     Chronic back pain- Currently taking tramadol. Tolerates medication well. Denies SE. Reports she ran out of medication and was unable to get into office due to arising health concerns. Pain has been increased due to being off pain medication.            2025     9:32 AM 2024     2:15 PM 2024     9:37 AM 10/26/2023     9:23 AM 2023    10:39 AM 2023     3:34 PM 1/3/2023    10:40 AM   PHQ Scores   PHQ2 Score 0 0 0 0 0 0 0   PHQ9 Score 1 2 1 4 0 3 6         I reviewed the medical records and past history for Lori.    Allergies   Allergen Reactions    Norco [Hydrocodone-Acetaminophen] Nausea And Vomiting       Current Outpatient Medications   Medication Sig Dispense Refill    traMADol (ULTRAM) 50 MG tablet Take 1 tablet by mouth every 8 hours as needed for Pain. Max Daily Amount: 150 mg      [START ON 3/31/2025] traMADol (ULTRAM) 50 MG tablet Take 1 tablet by mouth every 8 hours as needed for Pain for up to 30 days. Max Daily Amount: 150 mg 90 tablet 0    [START ON 3/1/2025] traMADol (ULTRAM) 50 MG tablet Take 1 tablet by mouth every 8 hours as needed for Pain for up to 30 days. Max Daily Amount: 150 mg 90 tablet 0    traMADol (ULTRAM) 50 MG tablet Take 1 tablet by mouth every 8 hours as needed for Pain for up to 30 days. Max Daily Amount: 150 mg 90 tablet 0    umeclidinium-vilanterol (ANORO ELLIPTA) 62.5-25 MCG/ACT inhaler Inhale 1 puff into the lungs daily 60 g 3    ipratropium 0.5 mg-albuterol 2.5 mg (DUONEB) 0.5-2.5 (3) MG/3ML SOLN nebulizer solution Take 3 mLs by nebulization 4 times daily as needed for Shortness of

## 2025-02-05 ENCOUNTER — HOSPITAL ENCOUNTER (OUTPATIENT)
Dept: CT IMAGING | Age: 75
Discharge: HOME | End: 2025-02-05
Attending: INTERNAL MEDICINE
Payer: MEDICARE

## 2025-02-05 DIAGNOSIS — Z87.891 PERSONAL HISTORY OF TOBACCO USE, PRESENTING HAZARDS TO HEALTH: ICD-10-CM

## 2025-02-05 PROCEDURE — 71271 CT THORAX LUNG CANCER SCR C-: CPT

## 2025-02-11 DIAGNOSIS — F51.01 PRIMARY INSOMNIA: ICD-10-CM

## 2025-02-11 RX ORDER — TRAZODONE HYDROCHLORIDE 100 MG/1
TABLET ORAL
Qty: 60 TABLET | Refills: 5 | Status: SHIPPED | OUTPATIENT
Start: 2025-02-11

## 2025-02-26 ENCOUNTER — COMMUNITY OUTREACH (OUTPATIENT)
Age: 75
End: 2025-02-26

## 2025-02-26 NOTE — PROGRESS NOTES
Patient's HM shows they are overdue for Colorectal Screening.   Care Everywhere and  files searched.  No results to attach to order nor HM updated.      
Yes

## 2025-03-13 ENCOUNTER — HOSPITAL ENCOUNTER (EMERGENCY)
Age: 75
Discharge: HOME | End: 2025-03-13
Attending: STUDENT IN AN ORGANIZED HEALTH CARE EDUCATION/TRAINING PROGRAM
Payer: MEDICARE

## 2025-03-13 ENCOUNTER — APPOINTMENT (OUTPATIENT)
Dept: GENERAL RADIOLOGY | Age: 75
End: 2025-03-13
Payer: MEDICARE

## 2025-03-13 VITALS
DIASTOLIC BLOOD PRESSURE: 54 MMHG | HEIGHT: 59 IN | WEIGHT: 124 LBS | HEART RATE: 85 BPM | SYSTOLIC BLOOD PRESSURE: 92 MMHG | RESPIRATION RATE: 22 BRPM | OXYGEN SATURATION: 91 % | TEMPERATURE: 97.9 F | BODY MASS INDEX: 25 KG/M2

## 2025-03-13 DIAGNOSIS — J44.1 COPD EXACERBATION (HCC): Primary | ICD-10-CM

## 2025-03-13 PROCEDURE — 2500000003 HC RX 250 WO HCPCS: Performed by: STUDENT IN AN ORGANIZED HEALTH CARE EDUCATION/TRAINING PROGRAM

## 2025-03-13 PROCEDURE — 6370000000 HC RX 637 (ALT 250 FOR IP): Performed by: STUDENT IN AN ORGANIZED HEALTH CARE EDUCATION/TRAINING PROGRAM

## 2025-03-13 PROCEDURE — 6360000002 HC RX W HCPCS: Performed by: STUDENT IN AN ORGANIZED HEALTH CARE EDUCATION/TRAINING PROGRAM

## 2025-03-13 PROCEDURE — 94640 AIRWAY INHALATION TREATMENT: CPT

## 2025-03-13 PROCEDURE — 99283 EMERGENCY DEPT VISIT LOW MDM: CPT

## 2025-03-13 PROCEDURE — 71045 X-RAY EXAM CHEST 1 VIEW: CPT

## 2025-03-13 PROCEDURE — 94664 DEMO&/EVAL PT USE INHALER: CPT

## 2025-03-13 RX ORDER — DOXYCYCLINE HYCLATE 100 MG
100 TABLET ORAL ONCE
Status: COMPLETED | OUTPATIENT
Start: 2025-03-13 | End: 2025-03-13

## 2025-03-13 RX ORDER — IPRATROPIUM BROMIDE AND ALBUTEROL SULFATE 2.5; .5 MG/3ML; MG/3ML
3 SOLUTION RESPIRATORY (INHALATION) ONCE
Status: COMPLETED | OUTPATIENT
Start: 2025-03-13 | End: 2025-03-13

## 2025-03-13 RX ORDER — GUAIFENESIN 200 MG/10ML
LIQUID ORAL
Status: DISCONTINUED
Start: 2025-03-13 | End: 2025-03-13 | Stop reason: HOSPADM

## 2025-03-13 RX ORDER — GUAIFENESIN 200 MG/10ML
400 LIQUID ORAL ONCE
Status: COMPLETED | OUTPATIENT
Start: 2025-03-13 | End: 2025-03-13

## 2025-03-13 RX ORDER — CETIRIZINE HYDROCHLORIDE 10 MG/1
10 TABLET ORAL DAILY
Status: DISCONTINUED | OUTPATIENT
Start: 2025-03-13 | End: 2025-03-13 | Stop reason: HOSPADM

## 2025-03-13 RX ORDER — BUDESONIDE AND FORMOTEROL FUMARATE DIHYDRATE 160; 4.5 UG/1; UG/1
2 AEROSOL RESPIRATORY (INHALATION) 2 TIMES DAILY
Qty: 30.6 G | Refills: 1 | Status: SHIPPED | OUTPATIENT
Start: 2025-03-13

## 2025-03-13 RX ORDER — DOXYCYCLINE HYCLATE 100 MG
100 TABLET ORAL 2 TIMES DAILY
Qty: 14 TABLET | Refills: 0 | Status: SHIPPED | OUTPATIENT
Start: 2025-03-13 | End: 2025-03-20

## 2025-03-13 RX ORDER — PREDNISONE 50 MG/1
50 TABLET ORAL DAILY
Qty: 4 TABLET | Refills: 0 | Status: SHIPPED | OUTPATIENT
Start: 2025-03-13 | End: 2025-03-17

## 2025-03-13 RX ADMIN — IPRATROPIUM BROMIDE AND ALBUTEROL SULFATE 3 DOSE: .5; 3 SOLUTION RESPIRATORY (INHALATION) at 17:20

## 2025-03-13 RX ADMIN — GUAIFENESIN 400 MG: 100 LIQUID ORAL at 17:38

## 2025-03-13 RX ADMIN — Medication 10 MG: at 17:38

## 2025-03-13 RX ADMIN — DOXYCYCLINE HYCLATE 100 MG: 100 TABLET, COATED ORAL at 17:38

## 2025-03-13 RX ADMIN — AMOXICILLIN AND CLAVULANATE POTASSIUM 1 TABLET: 875; 125 TABLET, FILM COATED ORAL at 17:38

## 2025-03-13 RX ADMIN — CETIRIZINE HYDROCHLORIDE 10 MG: 10 TABLET, FILM COATED ORAL at 17:38

## 2025-03-13 ASSESSMENT — PAIN SCALES - GENERAL: PAINLEVEL_OUTOF10: 0

## 2025-03-13 ASSESSMENT — PAIN - FUNCTIONAL ASSESSMENT: PAIN_FUNCTIONAL_ASSESSMENT: 0-10

## 2025-03-13 NOTE — ED PROVIDER NOTES
Emergency Department Encounter    Patient: Lori Brown  MRN: [id number]  : 1950  Date of Evaluation: 3/13/2025  ED Provider:  Gorge Anne MD    Triage Chief Complaint:   Shortness of Breath (X1 week but worsening per pt )    [place]:  Lori Brown is a 75 y.o. female with history significant for COPD, asthma, that presents for shortness of breath.  The patient mentions that for the last week she has had progressively worsening shortness of breath, initially with exertion but present also at rest now, described as moderate to severe, constant, minimally improved after breathing treatments and inhalers, associated with cough that is moderately productive of yellowish sputum, and with mild chest discomfort that occurs only with cough, and is located to the anterior rib cage.  The patient endorses mild rhinorrhea, but denies sneezing or sore throat.  No lower extremity edema, tenderness in calves or rashes.  No nausea, emesis, diarrhea or other GI symptoms.  No urinary abnormalities.  No fevers or chills.    ROS - see HPI, below listed is current ROS at time of my eval:  Systems reviewed and negative except as above.     Past Medical History:   Diagnosis Date    Abdominal aortic aneurysm (AAA) without rupture 2021    Following with Dr. Abraham Grant 10/31/2018    Asthma     Back ache     reason for taking tramadol    COPD (chronic obstructive pulmonary disease) (HCC)     COPD exacerbation (HCC) 2018    Dizziness 2021    H/O echocardiogram 2020    EF 50-55%.  Sclerotic, but non-stenotic aortic valve. Moderate AR. Mild TR, MR.     History of nuclear stress test 2020    EF 75%. This is a normal study.    History of nuclear stress test 2022    Normal study.    Needs flu shot 2018    Other chest pain 2021    PONV (postoperative nausea and vomiting)     Stable angina pectoris 2021    stable     Past Surgical History:   Procedure Laterality  min   Stress: Not on file   Social Connections: Not on file   Intimate Partner Violence: Not on file   Housing Stability: Low Risk  (1/10/2025)    Housing Stability Vital Sign     Unable to Pay for Housing in the Last Year: No     Number of Times Moved in the Last Year: 1     Homeless in the Last Year: No     No current facility-administered medications for this encounter.     Current Outpatient Medications   Medication Sig Dispense Refill    amoxicillin-clavulanate (AUGMENTIN) 875-125 MG per tablet Take 1 tablet by mouth 2 times daily for 7 days 14 tablet 0    doxycycline hyclate (VIBRA-TABS) 100 MG tablet Take 1 tablet by mouth 2 times daily for 7 days 14 tablet 0    predniSONE (DELTASONE) 50 MG tablet Take 1 tablet by mouth daily for 4 days 4 tablet 0    budesonide-formoterol (SYMBICORT) 160-4.5 MCG/ACT AERO Inhale 2 puffs into the lungs 2 times daily 30.6 g 1    azithromycin (ZITHROMAX) 250 MG tablet 500mg on day 1 followed by 250mg on days 2 - 5 (Patient not taking: Reported on 3/13/2025) 6 tablet 0    umeclidinium-vilanterol (ANORO ELLIPTA) 62.5-25 MCG/ACT inhaler Inhale 1 puff into the lungs daily 60 g 3    traZODone (DESYREL) 100 MG tablet TAKE ONE (1) OR TWO (2) TABLETS BY MOUTH AT BEDTIME 60 tablet 5    traMADol (ULTRAM) 50 MG tablet Take 1 tablet by mouth every 8 hours as needed for Pain. Max Daily Amount: 150 mg (Patient not taking: Reported on 3/13/2025)      [START ON 3/31/2025] traMADol (ULTRAM) 50 MG tablet Take 1 tablet by mouth every 8 hours as needed for Pain for up to 30 days. Max Daily Amount: 150 mg (Patient not taking: Reported on 3/13/2025) 90 tablet 0    traMADol (ULTRAM) 50 MG tablet Take 1 tablet by mouth every 8 hours as needed for Pain for up to 30 days. Max Daily Amount: 150 mg (Patient not taking: Reported on 3/13/2025) 90 tablet 0    ipratropium 0.5 mg-albuterol 2.5 mg (DUONEB) 0.5-2.5 (3) MG/3ML SOLN nebulizer solution Take 3 mLs by nebulization 4 times daily as needed for Shortness

## 2025-03-13 NOTE — ED NOTES
Discharge instructions reviewed with patient. Medications discussed. Encouraged to take medication exactly as prescribed and until the entire antibiotic prescription is finished. Patient advised to not stop the medication even if they begin feeling better. Patient voiced understanding.Discharge instructions reviewed with patient. Reviewed medications with patient. No additional questions asked.  Voiced understanding. Encouraged patient to follow up as discussed by the ED physician. Reviewed how to access [employer] at discharged with patient. Encourage to sign up either on their smartphone or on the computer to be able to review the information form today's and future visits. Voiced understanding. No additional questions asked. Patient ambulatory without difficulty. Physician aware.

## 2025-03-13 NOTE — DISCHARGE INSTRUCTIONS
You were seen in the emergency department for shortness of breath.  Here, oxygen levels were fine, and you had wheezing.  Symptoms are consistent with a COPD exacerbation.    An x-ray of your chest, did not reveal any evidence of pneumonia, or any other remarkable abnormalities.    We gave you breathing treatment, and you had improvement of your symptoms.    To continue management at home, I recommend you to keep using your albuterol inhaler every 4 hours while awake.    We started you on a course of steroids, given you the first dose in the emergency department.  I prescribed you 4 more days of the medication prednisone for you to take once daily, starting tomorrow.    I prescribed 2 antibiotics, one of them is called amoxicillin/clavulanate, you will take it twice daily for the next 7 days.  The other medication is doxycycline, to also be taken twice daily for 7 days.    I prescribed you the inhaler Symbicort, that has a combination of the medications budesonide (inhaled steroid) and formoterol (long-acting albuterol kind of thing).  Please use 2 puffs every day, twice daily, every 12 hours.    Please make an appointment to be seen by your primary care doctor next week.    If at some point you have severe shortness of breath, severe nausea or vomiting unable to keep anything down, feels severely weak unable to stand up, feels confused or are lethargic unable to do your normal daily activities, or have any other concerning symptoms, please come back promptly to the emergency department.

## 2025-04-10 NOTE — PROGRESS NOTES
MRN: [id number]  Name: Lori Brown  : 1950    Insurance: Payor: [facility] MEDICARE /  /  /      Phone #: [phone]  Provider: Bubba Crane MD     Date of Visit: 2025    Reason for visit:  cardio follow up  Recent Hospitalization Date: 3/13/25    Reason for Hospitalization: SOB    Last EK/25  Type of Device:       Vitals BP HR O2% WT HT ORTHO BP LYING ORTHO BP SITTING ORTHO BP SITTING   Today's Findings           Patients work up- Check List     Testing Last Date Completed Date Expected  ([place] [facility]) Additional Notes    MA to document For provider to complete Either MA or Provider    Carotid Duplex  STAT 1 WK 6 MTH       THIS WK 2 WK 1 YEAR     Cardiac CTA  STAT 1 WK 6 MTH       THIS WK 2 WK 1 YEAR     Cardiac CT Calcium scoring  STAT 1 WK 6 MTH       THIS WK 2 WK 1 YEAR     CTA Chest, Abdomen & Pelvis  STAT 1 WK 6 MTH       THIS WK 2 WK 1 YEAR     CT Chest IV w/ Contrast  STAT 1 WK 6 MTH       THIS WK 2 WK 1 YEAR     CT Chest w/o Contrast  STAT 1 WK 6 MTH       THIS WK 2 WK 1 YEAR     CXR  STAT 1 WK 6 MTH       THIS WK 2 WK 1 YEAR     ECHO  Stress Complete Limited     MRI- Cardiac  STAT 1 WK 6 MTH       THIS WK 2 WK 1 YEAR     MUGA Scan  STAT 1 WK 6 MTH       THIS WK 2 WK 1 YEAR     Nuclear Stress  Lexiscan Cardiolite     PFT  STAT 1 WK 6 MTH       THIS WK 2 WK 1 YEAR     Treadmill Stress Test  STAT 1 WK 6 MTH       THIS WK 2 WK 1 YEAR     Vascular Duplex  Lower: Right Left Bilat       Upper: Right Left Bilat     Other Test Not Listed:    Monitors Last Date Completed Day's Request/Ordered     Holter  Short term 24 hours 48 hours      Long term 3 days 7 days 14 days   Event   (1-30 days)      Procedures Last Date Performed Procedure Details Date Expected   Additional Notes    ASD Closure        Carotid Angio        Cardioversion        Heart Cath  R L R&L      Peripheral Angio  R L      PFO Closure        PTCA/PCI        JANETH        JANETH/Cardioversion        Venogram        Tilt Table

## 2025-04-29 ENCOUNTER — OFFICE VISIT (OUTPATIENT)
Age: 75
End: 2025-04-29
Payer: MEDICARE

## 2025-04-29 VITALS
DIASTOLIC BLOOD PRESSURE: 60 MMHG | RESPIRATION RATE: 18 BRPM | SYSTOLIC BLOOD PRESSURE: 118 MMHG | OXYGEN SATURATION: 94 % | HEART RATE: 87 BPM | WEIGHT: 126.8 LBS | BODY MASS INDEX: 25.61 KG/M2

## 2025-04-29 DIAGNOSIS — G89.29 CHRONIC MIDLINE LOW BACK PAIN WITHOUT SCIATICA: ICD-10-CM

## 2025-04-29 DIAGNOSIS — M54.50 CHRONIC BILATERAL LOW BACK PAIN WITHOUT SCIATICA: ICD-10-CM

## 2025-04-29 DIAGNOSIS — M54.50 CHRONIC MIDLINE LOW BACK PAIN WITHOUT SCIATICA: ICD-10-CM

## 2025-04-29 DIAGNOSIS — M70.61 GREATER TROCHANTERIC BURSITIS OF RIGHT HIP: ICD-10-CM

## 2025-04-29 DIAGNOSIS — S46.912A STRAIN OF LEFT SHOULDER, INITIAL ENCOUNTER: ICD-10-CM

## 2025-04-29 DIAGNOSIS — G89.29 CHRONIC BILATERAL THORACIC BACK PAIN: ICD-10-CM

## 2025-04-29 DIAGNOSIS — Z51.81 MEDICATION MONITORING ENCOUNTER: Primary | ICD-10-CM

## 2025-04-29 DIAGNOSIS — G89.29 CHRONIC BILATERAL LOW BACK PAIN WITHOUT SCIATICA: ICD-10-CM

## 2025-04-29 DIAGNOSIS — M54.6 CHRONIC BILATERAL THORACIC BACK PAIN: ICD-10-CM

## 2025-04-29 DIAGNOSIS — D64.9 ANEMIA, UNSPECIFIED TYPE: ICD-10-CM

## 2025-04-29 PROBLEM — I20.89 STABLE ANGINA PECTORIS: Status: RESOLVED | Noted: 2021-07-08 | Resolved: 2025-04-29

## 2025-04-29 LAB
ALCOHOL URINE: NORMAL
AMPHETAMINE SCREEN URINE: NORMAL
BARBITURATE SCREEN URINE: NORMAL
BENZODIAZEPINE SCREEN, URINE: NORMAL
BUPRENORPHINE URINE: NORMAL
COCAINE METABOLITE SCREEN URINE: NORMAL
FENTANYL SCREEN, URINE: NORMAL
GABAPENTIN SCREEN, URINE: NORMAL
MDMA, URINE: NORMAL
METHADONE SCREEN, URINE: NORMAL
METHAMPHETAMINE, URINE: NORMAL
OPIATE SCREEN URINE: NORMAL
OXYCODONE SCREEN URINE: NORMAL
PHENCYCLIDINE SCREEN URINE: NORMAL
PROPOXYPHENE SCREEN, URINE: NORMAL
SYNTHETIC CANNABINOIDS(K2) SCREEN, URINE: NORMAL
THC SCREEN, URINE: NORMAL
TRAMADOL SCREEN URINE: NORMAL
TRICYCLIC ANTIDEPRESSANTS, UR: NORMAL

## 2025-04-29 PROCEDURE — 99214 OFFICE O/P EST MOD 30 MIN: CPT

## 2025-04-29 PROCEDURE — 80305 DRUG TEST PRSMV DIR OPT OBS: CPT

## 2025-04-29 PROCEDURE — 1159F MED LIST DOCD IN RCRD: CPT

## 2025-04-29 PROCEDURE — 1123F ACP DISCUSS/DSCN MKR DOCD: CPT

## 2025-04-29 PROCEDURE — G2211 COMPLEX E/M VISIT ADD ON: HCPCS

## 2025-04-29 RX ORDER — NAPROXEN 250 MG/1
500 TABLET ORAL 2 TIMES DAILY WITH MEALS
Qty: 56 TABLET | Refills: 0 | Status: SHIPPED | OUTPATIENT
Start: 2025-04-29 | End: 2025-05-13

## 2025-04-29 RX ORDER — TRAMADOL HYDROCHLORIDE 50 MG/1
50 TABLET ORAL EVERY 8 HOURS PRN
Qty: 90 TABLET | Refills: 0 | Status: SHIPPED | OUTPATIENT
Start: 2025-04-29 | End: 2025-05-29

## 2025-04-29 NOTE — PROGRESS NOTES
[facility]  [address]  [facility] [address]  Dept: [phone]  Dept Fax: [phone]  Loc: [phone]      Visit type: Established patient    Encounter Start Time: 10:54 AM EDT  Encounter End Time: 11:49 AM EDT     100% of the time was spent with the patient today, discussing their symptoms, conducting an examination, reviewing the patient's diagnostic test results, and counseling    Reason for Visit: New Patient (Left shoulder pain X3 weeks. )      Assessment and Plan         Assessment & Plan  Left shoulder strain  Uncontrolled  Reports left shoulder pain for the past 3 weeks, with popping and cracking sounds and difficulty performing daily activities such as combing her hair. Physical examination reveals tenderness in the deltoid muscle.  DIAGNOSTIC STUDIES:  - An x-ray of the left shoulder will be ordered to assess the extent of the strain.  TREATMENT:  - Naproxen 500 mg twice daily with food for 14 days to reduce inflammation.  - Voltaren gel (diclofenac) for topical application up to four times daily. The patient is advised to avoid other oral NSAIDs while using Voltaren gel.    Greater trochanteric bursitis  Uncontrolled  Experiences pain in the right hip area, which worsens with activities such as bending and vacuuming. Physical examination indicates inflammation in the greater trochanteric region.  DIAGNOSTIC STUDIES:  - An x-ray of the right hip will be ordered.  TREATMENT:  - Naproxen 500 mg twice daily with food for 14 days to reduce inflammation.  - Voltaren gel (diclofenac) for topical application up to four times daily. The patient is advised to avoid other oral NSAIDs while using Voltaren gel.    Chronic back pain  Uncontrolled  History of curvature of the spine and chronic back pain, which worsens with certain activities and causes significant discomfort.  DIAGNOSTIC STUDIES:  - A back x-ray will be ordered to evaluate the current state of the

## 2025-04-30 ENCOUNTER — HOSPITAL ENCOUNTER (OUTPATIENT)
Age: 75
Discharge: HOME | End: 2025-04-30
Payer: MEDICARE

## 2025-04-30 ENCOUNTER — HOSPITAL ENCOUNTER (OUTPATIENT)
Dept: GENERAL RADIOLOGY | Age: 75
Discharge: HOME | End: 2025-04-30
Payer: MEDICARE

## 2025-04-30 DIAGNOSIS — G89.29 CHRONIC MIDLINE LOW BACK PAIN WITHOUT SCIATICA: ICD-10-CM

## 2025-04-30 DIAGNOSIS — G89.29 CHRONIC BILATERAL LOW BACK PAIN WITHOUT SCIATICA: ICD-10-CM

## 2025-04-30 DIAGNOSIS — M54.50 CHRONIC BILATERAL LOW BACK PAIN WITHOUT SCIATICA: ICD-10-CM

## 2025-04-30 DIAGNOSIS — G89.29 CHRONIC BILATERAL THORACIC BACK PAIN: ICD-10-CM

## 2025-04-30 DIAGNOSIS — M54.6 CHRONIC BILATERAL THORACIC BACK PAIN: ICD-10-CM

## 2025-04-30 DIAGNOSIS — M54.50 CHRONIC MIDLINE LOW BACK PAIN WITHOUT SCIATICA: ICD-10-CM

## 2025-04-30 LAB
ALBUMIN SERPL-MCNC: 4.3 G/DL (ref 3.4–5)
ALBUMIN/GLOB SERPL: 1.9 {RATIO} (ref 1.1–2.2)
ALP SERPL-CCNC: 95 U/L (ref 40–129)
ALT SERPL-CCNC: 9 U/L (ref 10–40)
ANION GAP SERPL CALCULATED.3IONS-SCNC: 12 MMOL/L (ref 3–16)
AST SERPL-CCNC: 14 U/L (ref 15–37)
BASOPHILS # BLD: 0.1 K/UL (ref 0–0.2)
BASOPHILS NFR BLD: 1.5 %
BILIRUB SERPL-MCNC: 0.3 MG/DL (ref 0–1)
BUN SERPL-MCNC: 9 MG/DL (ref 7–20)
CALCIUM SERPL-MCNC: 9.4 MG/DL (ref 8.3–10.6)
CHLORIDE SERPL-SCNC: 102 MMOL/L (ref 99–110)
CO2 SERPL-SCNC: 23 MMOL/L (ref 21–32)
CREAT SERPL-MCNC: 0.6 MG/DL (ref 0.6–1.2)
DEPRECATED RDW RBC AUTO: 13.9 % (ref 12.4–15.4)
EOSINOPHIL # BLD: 0.1 K/UL (ref 0–0.6)
EOSINOPHIL NFR BLD: 1.6 %
GFR SERPLBLD CREATININE-BSD FMLA CKD-EPI: >90 ML/MIN/{1.73_M2}
GLUCOSE SERPL-MCNC: 62 MG/DL (ref 70–99)
HCT VFR BLD AUTO: 40.2 % (ref 36–48)
HGB BLD-MCNC: 13.7 G/DL (ref 12–16)
LYMPHOCYTES # BLD: 1.6 K/UL (ref 1–5.1)
LYMPHOCYTES NFR BLD: 26.2 %
MCH RBC QN AUTO: 32.3 PG (ref 26–34)
MCHC RBC AUTO-ENTMCNC: 34.1 G/DL (ref 31–36)
MCV RBC AUTO: 94.8 FL (ref 80–100)
MONOCYTES # BLD: 0.4 K/UL (ref 0–1.3)
MONOCYTES NFR BLD: 6.7 %
NEUTROPHILS # BLD: 4 K/UL (ref 1.7–7.7)
NEUTROPHILS NFR BLD: 64 %
PLATELET # BLD AUTO: 216 K/UL (ref 135–450)
PMV BLD AUTO: 8.9 FL (ref 5–10.5)
POTASSIUM SERPL-SCNC: 5.4 MMOL/L (ref 3.5–5.1)
PROT SERPL-MCNC: 6.6 G/DL (ref 6.4–8.2)
RBC # BLD AUTO: 4.23 M/UL (ref 4–5.2)
SODIUM SERPL-SCNC: 137 MMOL/L (ref 136–145)
TSH SERPL DL<=0.005 MIU/L-ACNC: 3.66 UIU/ML (ref 0.27–4.2)
WBC # BLD AUTO: 6.3 K/UL (ref 4–11)

## 2025-04-30 PROCEDURE — 72040 X-RAY EXAM NECK SPINE 2-3 VW: CPT

## 2025-04-30 PROCEDURE — 72100 X-RAY EXAM L-S SPINE 2/3 VWS: CPT

## 2025-04-30 PROCEDURE — 72070 X-RAY EXAM THORAC SPINE 2VWS: CPT

## 2025-05-05 ENCOUNTER — RESULTS FOLLOW-UP (OUTPATIENT)
Age: 75
End: 2025-05-05

## 2025-05-08 ENCOUNTER — OFFICE VISIT (OUTPATIENT)
Age: 75
End: 2025-05-08
Payer: MEDICARE

## 2025-05-08 VITALS
SYSTOLIC BLOOD PRESSURE: 124 MMHG | WEIGHT: 130.4 LBS | DIASTOLIC BLOOD PRESSURE: 72 MMHG | RESPIRATION RATE: 18 BRPM | HEART RATE: 72 BPM | OXYGEN SATURATION: 94 % | BODY MASS INDEX: 26.34 KG/M2

## 2025-05-08 DIAGNOSIS — I71.43 INFRARENAL ABDOMINAL AORTIC ANEURYSM (AAA) WITHOUT RUPTURE: Primary | ICD-10-CM

## 2025-05-08 DIAGNOSIS — F32.1 CURRENT MODERATE EPISODE OF MAJOR DEPRESSIVE DISORDER WITHOUT PRIOR EPISODE (HCC): ICD-10-CM

## 2025-05-08 DIAGNOSIS — M54.50 CHRONIC MIDLINE LOW BACK PAIN WITHOUT SCIATICA: ICD-10-CM

## 2025-05-08 DIAGNOSIS — G89.29 CHRONIC MIDLINE LOW BACK PAIN WITHOUT SCIATICA: ICD-10-CM

## 2025-05-08 PROCEDURE — 1090F PRES/ABSN URINE INCON ASSESS: CPT

## 2025-05-08 PROCEDURE — G2211 COMPLEX E/M VISIT ADD ON: HCPCS

## 2025-05-08 PROCEDURE — G8427 DOCREV CUR MEDS BY ELIG CLIN: HCPCS

## 2025-05-08 PROCEDURE — 1036F TOBACCO NON-USER: CPT

## 2025-05-08 PROCEDURE — G8419 CALC BMI OUT NRM PARAM NOF/U: HCPCS

## 2025-05-08 PROCEDURE — 1159F MED LIST DOCD IN RCRD: CPT

## 2025-05-08 PROCEDURE — 3017F COLORECTAL CA SCREEN DOC REV: CPT

## 2025-05-08 PROCEDURE — 1123F ACP DISCUSS/DSCN MKR DOCD: CPT

## 2025-05-08 PROCEDURE — 99214 OFFICE O/P EST MOD 30 MIN: CPT

## 2025-05-08 PROCEDURE — G8400 PT W/DXA NO RESULTS DOC: HCPCS

## 2025-05-08 RX ORDER — ESCITALOPRAM OXALATE 5 MG/1
5 TABLET ORAL DAILY
Qty: 90 TABLET | Refills: 1 | Status: SHIPPED | OUTPATIENT
Start: 2025-05-08

## 2025-05-08 NOTE — PROGRESS NOTES
[facility]  [address]  [facility] [address]  Dept: [phone]  Dept Fax: [phone]  Loc: [phone]      Visit type: Established patient    Encounter Start Time: 10:43 AM EDT  Encounter End Time: 11:20 AM EDT     100% of the time was spent with the patient today, discussing their symptoms, conducting an examination, reviewing the patient's diagnostic test results, and counseling    Reason for Visit: Follow-up (Xray follow up. States she can not take the naproxen \"hurts stomach\".)      Assessment and Plan         Assessment & Plan  Aortic aneurysm  Uncontrolled  Worsening - The aneurysm has shown a gradual increase in size over the past 4 years, expanding by approximately 1 cm. The patient does not exhibit any classical symptoms associated with an impending rupture. Her blood pressure is not excessively high, and she is not overweight, both of which could potentially exacerbate the condition.  DIAGNOSTIC STUDIES:  - CT scan of the abdomen and pelvis with and without contrast  TREATMENT:  - Limit physical activity to walking  - Avoid lifting anything heavier than 10 pounds until the results of the CT scan are available  - Seek immediate medical attention if experiencing a sharp, tearing pain in the abdomen  FUTURE PLANS:  - Follow up in 1 month    Back pain  Uncontrolled  No Change - Her back pain is likely due to degenerative arthritis, which is exacerbated by her scoliosis. Despite her age and condition, her back health is relatively good. She is not overweight, which would otherwise place additional strain on her back.  TREATMENT:  - Refill of tramadol prescription, take every 6 hours as needed for pain relief  - Exercise deferred until the status of her aortic aneurysm is determined  FUTURE PLANS:  - Follow up in 1 month    Depression  Uncontrolled  No Change - She exhibits symptoms of depression, including decreased appetite and weight loss. She has been

## 2025-05-13 ENCOUNTER — HOSPITAL ENCOUNTER (OUTPATIENT)
Dept: CT IMAGING | Age: 75
Discharge: HOME | End: 2025-05-13
Payer: MEDICARE

## 2025-05-13 DIAGNOSIS — I71.43 INFRARENAL ABDOMINAL AORTIC ANEURYSM (AAA) WITHOUT RUPTURE: ICD-10-CM

## 2025-05-13 PROCEDURE — 74174 CTA ABD&PLVS W/CONTRAST: CPT

## 2025-05-13 PROCEDURE — 6360000004 HC RX CONTRAST MEDICATION

## 2025-05-13 RX ORDER — IOPAMIDOL 755 MG/ML
100 INJECTION, SOLUTION INTRAVASCULAR
Status: COMPLETED | OUTPATIENT
Start: 2025-05-13 | End: 2025-05-13

## 2025-05-13 RX ADMIN — IOPAMIDOL 100 ML: 755 INJECTION, SOLUTION INTRAVENOUS at 11:06

## 2025-05-14 ENCOUNTER — RESULTS FOLLOW-UP (OUTPATIENT)
Age: 75
End: 2025-05-14

## 2025-05-14 DIAGNOSIS — I71.43 INFRARENAL ABDOMINAL AORTIC ANEURYSM (AAA) WITHOUT RUPTURE: Primary | ICD-10-CM

## 2025-06-10 ENCOUNTER — OFFICE VISIT (OUTPATIENT)
Age: 75
End: 2025-06-10
Payer: MEDICARE

## 2025-06-10 VITALS
BODY MASS INDEX: 26.34 KG/M2 | OXYGEN SATURATION: 93 % | WEIGHT: 130.4 LBS | SYSTOLIC BLOOD PRESSURE: 112 MMHG | DIASTOLIC BLOOD PRESSURE: 60 MMHG | HEART RATE: 91 BPM | RESPIRATION RATE: 20 BRPM

## 2025-06-10 DIAGNOSIS — J44.9 MODERATE COPD (CHRONIC OBSTRUCTIVE PULMONARY DISEASE) (HCC): ICD-10-CM

## 2025-06-10 DIAGNOSIS — I71.43 INFRARENAL ABDOMINAL AORTIC ANEURYSM (AAA) WITHOUT RUPTURE: Primary | ICD-10-CM

## 2025-06-10 DIAGNOSIS — G89.29 CHRONIC MIDLINE LOW BACK PAIN WITHOUT SCIATICA: ICD-10-CM

## 2025-06-10 DIAGNOSIS — Z51.81 MEDICATION MONITORING ENCOUNTER: ICD-10-CM

## 2025-06-10 DIAGNOSIS — G89.29 CHRONIC BILATERAL THORACIC BACK PAIN: ICD-10-CM

## 2025-06-10 DIAGNOSIS — M54.6 CHRONIC BILATERAL THORACIC BACK PAIN: ICD-10-CM

## 2025-06-10 DIAGNOSIS — M54.50 CHRONIC MIDLINE LOW BACK PAIN WITHOUT SCIATICA: ICD-10-CM

## 2025-06-10 PROCEDURE — G2211 COMPLEX E/M VISIT ADD ON: HCPCS

## 2025-06-10 PROCEDURE — 80305 DRUG TEST PRSMV DIR OPT OBS: CPT

## 2025-06-10 PROCEDURE — 99214 OFFICE O/P EST MOD 30 MIN: CPT

## 2025-06-10 PROCEDURE — 1123F ACP DISCUSS/DSCN MKR DOCD: CPT

## 2025-06-10 RX ORDER — UMECLIDINIUM BROMIDE AND VILANTEROL TRIFENATATE 62.5; 25 UG/1; UG/1
1 POWDER RESPIRATORY (INHALATION) DAILY
Qty: 60 G | Refills: 3 | Status: SHIPPED | OUTPATIENT
Start: 2025-06-10

## 2025-06-10 RX ORDER — TRAMADOL HYDROCHLORIDE 50 MG/1
50 TABLET ORAL EVERY 6 HOURS PRN
Qty: 120 TABLET | Refills: 0 | Status: SHIPPED | OUTPATIENT
Start: 2025-06-10 | End: 2025-07-10

## 2025-06-10 NOTE — PROGRESS NOTES
Deep Tendon Reflexes:      Reflex Scores:       Patellar reflexes are 2+ on the right side and 2+ on the left side.  Psychiatric:         Attention and Perception: Attention and perception normal.         Mood and Affect: Mood is anxious and depressed.         Behavior: Behavior normal. Behavior is cooperative.         Thought Content: Thought content normal. Thought content does not include homicidal or suicidal ideation.         Judgment: Judgment normal.      Comments: Appeals well-groomed and well kempt.  No odor. Affect is congruent.             Data Reviewed and Summarized       External history source:  No    Results      Gogo Hassan MD  [facility]  06/10/25  10:13 AM      This dictation was prepared using Dragon Medical voice recognition software. As a result, errors may occur.   The patient (or guardian, if applicable) and other individuals in attendance with the patient were advised that Artificial Intelligence will be utilized during this visit to record, process the conversation to generate a clinical note, and support improvement of the AI technology. The patient (or guardian, if applicable) and other individuals in attendance at the appointment consented to the use of AI, including the recording.      The patient (or guardian, if applicable) and other individuals in attendance with the patient were advised that Artificial Intelligence will be utilized during this visit to record, process the conversation to generate a clinical note, and support improvement of the AI technology. The patient (or guardian, if applicable) and other individuals in attendance at the appointment consented to the use of AI, including the recording.

## 2025-06-11 ENCOUNTER — OFFICE VISIT (OUTPATIENT)
Dept: CARDIOLOGY CLINIC | Age: 75
End: 2025-06-11
Payer: COMMERCIAL

## 2025-06-11 VITALS
HEIGHT: 59 IN | HEART RATE: 76 BPM | DIASTOLIC BLOOD PRESSURE: 70 MMHG | SYSTOLIC BLOOD PRESSURE: 126 MMHG | BODY MASS INDEX: 26.21 KG/M2 | WEIGHT: 130 LBS

## 2025-06-11 DIAGNOSIS — Z01.810 PREOP CARDIOVASCULAR EXAM: Primary | ICD-10-CM

## 2025-06-11 PROCEDURE — 99214 OFFICE O/P EST MOD 30 MIN: CPT | Performed by: INTERNAL MEDICINE

## 2025-06-11 PROCEDURE — 1123F ACP DISCUSS/DSCN MKR DOCD: CPT | Performed by: INTERNAL MEDICINE

## 2025-06-11 NOTE — PROGRESS NOTES
Fito Crane MD        OFFICE  FOLLOWUP NOTE    Chief complaints: patient is here for management of chest tightness, GERD, PALPITATONS     Subjective: patient feels better, no chest pain, no shortness of breath, no dizziness, no palpitations    CARMELO Sandoval is a 75 y.o.year old who  has a past medical history of Abdominal aortic aneurysm (AAA) without rupture, Anhedonia, Asthma, Back ache, COPD (chronic obstructive pulmonary disease) (HCC), COPD exacerbation (HCC), Dizziness, H/O echocardiogram, History of nuclear stress test, History of nuclear stress test, Needs flu shot, Other chest pain, PONV (postoperative nausea and vomiting), and Stable angina pectoris. and presents for management of chest tightness, GERD, PALPITATONS , which are well controlled      Current Outpatient Medications   Medication Sig Dispense Refill    umeclidinium-vilanterol (ANORO ELLIPTA) 62.5-25 MCG/ACT inhaler Inhale 1 puff into the lungs daily 60 g 3    traMADol (ULTRAM) 50 MG tablet Take 1 tablet by mouth every 6 hours as needed for Pain for up to 30 days. Intended supply: 3 days. Take lowest dose possible to manage pain Max Daily Amount: 200 mg 120 tablet 0    budesonide-formoterol (SYMBICORT) 160-4.5 MCG/ACT AERO Inhale 2 puffs into the lungs 2 times daily 30.6 g 1    traZODone (DESYREL) 100 MG tablet TAKE ONE (1) OR TWO (2) TABLETS BY MOUTH AT BEDTIME 60 tablet 5    ipratropium 0.5 mg-albuterol 2.5 mg (DUONEB) 0.5-2.5 (3) MG/3ML SOLN nebulizer solution Take 3 mLs by nebulization 4 times daily as needed for Shortness of Breath or Wheezing 120 each 3    albuterol sulfate HFA (PROVENTIL;VENTOLIN;PROAIR) 108 (90 Base) MCG/ACT inhaler INHALE TWO (2) PUFFS INTO THE LUNGS EVERY SIX (6) HOURS AS NEEDED FOR WHEEZING 8.5 g 1    escitalopram (LEXAPRO) 5 MG tablet Take 1 tablet by mouth daily (Patient not taking: Reported on 6/11/2025) 90 tablet 1    albuterol sulfate HFA (PROVENTIL HFA) 108 (90 Base) MCG/ACT inhaler Inhale 2

## 2025-06-11 NOTE — PATIENT INSTRUCTIONS
Thank you for allowing us to care for you today!   We want to ensure we can follow your treatment plan and we strive to give you the best outcomes and experience possible.   If you ever have a life threatening emergency and call 911 - for an ambulance (EMS)  REMEMBER  Our providers can only care for you at:   [facility] or [facility]   Even if you have someone take you or you drive yourself we can only care for you in a [facility] facility. Our providers are not setup at the other healthcare locations!    PLEASE CALL OUR OFFICE DURING NORMAL BUSINESS HOURS  Monday through Friday 8 am to 5 pm  AFTER HOURS the physician on-call cannot help with scheduling, rescheduling, procedure instruction questions or any type of medication need or issue.  [facility] P:[phone] - [facility] P:[phone] - [facility] P:[phone]      If you receive a survey:  We would appreciate you taking the time to share your experience concerning your provider visit in the office.    These surveys are confidential!  We are eager to improve and are counting on you to share your feedback so we can ensure you get the best care possible.

## 2025-06-13 ENCOUNTER — TELEPHONE (OUTPATIENT)
Dept: CARDIOLOGY CLINIC | Age: 75
End: 2025-06-13

## 2025-06-13 NOTE — TELEPHONE ENCOUNTER
Cardiologist: Dr. Crane  Surgeon: Dr. Martinez   Surgery: Endovascular abdominal aortic aneurysm repair   Anesthesia: General  Date: TBD  FAX# [phone]    # [phone]    Last OV 6/11/25 w/Rosa Maria    NM - 8/30/22     Summary   Supervising physician Dr. Carson .   Normal LV function.NORMAL EDV   There is normal isotope uptake following exercise and at rest. There is no evidence of exercise induced ischemia.   This is a normal study.    Echo - 6/25/25      Left Ventricle: Normal left ventricular systolic function with a visually estimated EF of 50 - 55%. Left ventricle size is normal. Mildly increased wall thickness. Findings consistent with mild concentric hypertrophy. Normal wall motion. Grade I diastolic dysfunction with normal LAP.    Aortic Valve: Mild sclerosis of the aortic valve, right and noncoronary cusps. Moderate regurgitation with an eccentrically directed jet and may underestimate severity.    Right Ventricle: Right ventricle size is normal. Normal systolic function.    Tricuspid Valve: Mild regurgitation. Normal RVSP.    Mitral Valve: Mild regurgitation.    Aorta: Normal sized aortic root and ascending aorta. Dilated abdominal aorta. Ao abdominal diameter is 3.0 cm.    Pericardium: Small (<1 cm) circumferential pericardial effusion present. No indication of cardiac tamponade.    Image quality is fair.

## 2025-06-18 ENCOUNTER — TELEPHONE (OUTPATIENT)
Age: 75
End: 2025-06-18

## 2025-06-18 NOTE — TELEPHONE ENCOUNTER
Patient called stating that she has COPD and has a flare up currently and does not want it to get bad and end up in the hospital so asking if PCP can send Prednisone to the pharmacy for her without visit. Patient denies respiratory distress or sick sx at this time. Please advise.

## 2025-06-19 ENCOUNTER — OFFICE VISIT (OUTPATIENT)
Age: 75
End: 2025-06-19
Payer: COMMERCIAL

## 2025-06-19 VITALS
SYSTOLIC BLOOD PRESSURE: 110 MMHG | DIASTOLIC BLOOD PRESSURE: 58 MMHG | RESPIRATION RATE: 20 BRPM | WEIGHT: 130.8 LBS | HEART RATE: 87 BPM | BODY MASS INDEX: 26.42 KG/M2 | OXYGEN SATURATION: 89 %

## 2025-06-19 DIAGNOSIS — J44.1 COPD EXACERBATION (HCC): Primary | ICD-10-CM

## 2025-06-19 DIAGNOSIS — S39.012A BACK STRAIN, INITIAL ENCOUNTER: ICD-10-CM

## 2025-06-19 PROCEDURE — 1123F ACP DISCUSS/DSCN MKR DOCD: CPT

## 2025-06-19 PROCEDURE — 99214 OFFICE O/P EST MOD 30 MIN: CPT

## 2025-06-19 PROCEDURE — 94640 AIRWAY INHALATION TREATMENT: CPT

## 2025-06-19 RX ORDER — FLUTICASONE PROPIONATE 50 MCG
1 SPRAY, SUSPENSION (ML) NASAL DAILY
Qty: 32 G | Refills: 1 | Status: SHIPPED | OUTPATIENT
Start: 2025-06-19

## 2025-06-19 RX ORDER — IPRATROPIUM BROMIDE AND ALBUTEROL SULFATE 2.5; .5 MG/3ML; MG/3ML
1 SOLUTION RESPIRATORY (INHALATION) EVERY 4 HOURS PRN
Qty: 75 ML | Refills: 0 | Status: SHIPPED | OUTPATIENT
Start: 2025-06-19 | End: 2025-06-24

## 2025-06-19 RX ORDER — TIZANIDINE HYDROCHLORIDE 2 MG/1
2 CAPSULE, GELATIN COATED ORAL NIGHTLY PRN
Qty: 5 CAPSULE | Refills: 0 | Status: SHIPPED | OUTPATIENT
Start: 2025-06-19 | End: 2025-06-24

## 2025-06-19 RX ORDER — IPRATROPIUM BROMIDE AND ALBUTEROL SULFATE 2.5; .5 MG/3ML; MG/3ML
1 SOLUTION RESPIRATORY (INHALATION) ONCE
Status: COMPLETED | OUTPATIENT
Start: 2025-06-19 | End: 2025-06-19

## 2025-06-19 RX ORDER — PREDNISONE 20 MG/1
20 TABLET ORAL DAILY
Qty: 5 TABLET | Refills: 0 | Status: SHIPPED | OUTPATIENT
Start: 2025-06-19 | End: 2025-06-24

## 2025-06-19 RX ADMIN — IPRATROPIUM BROMIDE AND ALBUTEROL SULFATE 1 DOSE: 2.5; .5 SOLUTION RESPIRATORY (INHALATION) at 09:00

## 2025-06-19 NOTE — PROGRESS NOTES
has recently resumed its use.    She also reports muscle tightness across her back, which she attributes to her coughing. She finds it more comfortable to sleep in an upright position as lying down results in a sensation of heaviness.        Past medical history reviewed  Medication review  Allergies reviewed  Social history reviewed    Objective       BP (!) 110/58 (BP Site: Right Upper Arm, Patient Position: Sitting, BP Cuff Size: Large Adult)   Pulse 87   Resp 20   Wt 59.3 kg (130 lb 12.8 oz)   SpO2 (!) 89%   BMI 26.42 kg/m²       Physical Exam  Vitals reviewed.   Constitutional:       Comments: Appears out of breath with only able to talk a few words before stopping to catch her breath   Cardiovascular:      Rate and Rhythm: Normal rate and regular rhythm.      Pulses:           Radial pulses are 2+ on the right side and 2+ on the left side.        Dorsalis pedis pulses are 2+ on the right side and 2+ on the left side.      Heart sounds: No murmur heard.     No gallop.   Pulmonary:      Effort: Pulmonary effort is normal. Tachypnea and prolonged expiration present.      Breath sounds: Decreased air movement present. Wheezing present.      Comments: Normal examination after administration of duoneb  Abdominal:      Palpations: Abdomen is soft.      Tenderness: There is no abdominal tenderness.   Musculoskeletal:      Cervical back: Normal range of motion and neck supple.      Thoracic back: Spasms and tenderness present.      Right lower leg: No edema.      Left lower leg: No edema.   Skin:     General: Skin is warm and dry.      Capillary Refill: Capillary refill takes less than 2 seconds.   Neurological:      Mental Status: She is alert.      Deep Tendon Reflexes:      Reflex Scores:       Patellar reflexes are 2+ on the right side and 2+ on the left side.            Data Reviewed and Summarized       External history source:  No    Results      Gogo Hassan MD  [facility]

## 2025-06-30 ENCOUNTER — RESULTS FOLLOW-UP (OUTPATIENT)
Dept: CARDIOLOGY CLINIC | Age: 75
End: 2025-06-30

## 2025-07-08 ENCOUNTER — OFFICE VISIT (OUTPATIENT)
Age: 75
End: 2025-07-08
Payer: MEDICARE

## 2025-07-08 VITALS
OXYGEN SATURATION: 94 % | WEIGHT: 130.4 LBS | HEART RATE: 81 BPM | SYSTOLIC BLOOD PRESSURE: 118 MMHG | DIASTOLIC BLOOD PRESSURE: 62 MMHG | RESPIRATION RATE: 20 BRPM | BODY MASS INDEX: 26.34 KG/M2

## 2025-07-08 DIAGNOSIS — M54.6 CHRONIC BILATERAL THORACIC BACK PAIN: ICD-10-CM

## 2025-07-08 DIAGNOSIS — G89.29 CHRONIC MIDLINE LOW BACK PAIN WITHOUT SCIATICA: ICD-10-CM

## 2025-07-08 DIAGNOSIS — R39.15 URINARY URGENCY: Primary | ICD-10-CM

## 2025-07-08 DIAGNOSIS — M54.50 CHRONIC MIDLINE LOW BACK PAIN WITHOUT SCIATICA: ICD-10-CM

## 2025-07-08 DIAGNOSIS — G89.29 CHRONIC BILATERAL THORACIC BACK PAIN: ICD-10-CM

## 2025-07-08 DIAGNOSIS — F51.01 PRIMARY INSOMNIA: ICD-10-CM

## 2025-07-08 LAB
BILIRUBIN, POC: NORMAL
BLOOD URINE, POC: NORMAL
CLARITY, POC: NORMAL
COLOR, POC: NORMAL
GLUCOSE URINE, POC: NORMAL MG/DL
KETONES, POC: NORMAL MG/DL
LEUKOCYTE EST, POC: NORMAL
NITRITE, POC: NORMAL
PH, POC: 6.5
PROTEIN, POC: NORMAL MG/DL
SPECIFIC GRAVITY, POC: 1.01
UROBILINOGEN, POC: NORMAL MG/DL

## 2025-07-08 PROCEDURE — 1159F MED LIST DOCD IN RCRD: CPT

## 2025-07-08 PROCEDURE — 99214 OFFICE O/P EST MOD 30 MIN: CPT

## 2025-07-08 PROCEDURE — 1123F ACP DISCUSS/DSCN MKR DOCD: CPT

## 2025-07-08 PROCEDURE — G2211 COMPLEX E/M VISIT ADD ON: HCPCS

## 2025-07-08 PROCEDURE — 81003 URINALYSIS AUTO W/O SCOPE: CPT

## 2025-07-08 RX ORDER — TRAMADOL HYDROCHLORIDE 200 MG/1
200 CAPSULE ORAL DAILY
Qty: 30 CAPSULE | Refills: 0 | Status: SHIPPED | OUTPATIENT
Start: 2025-07-08 | End: 2025-08-07

## 2025-07-08 RX ORDER — TRAZODONE HYDROCHLORIDE 100 MG/1
TABLET ORAL
Qty: 60 TABLET | Refills: 5 | Status: SHIPPED | OUTPATIENT
Start: 2025-07-08

## 2025-07-08 RX ORDER — TRAMADOL HYDROCHLORIDE 50 MG/1
50 TABLET ORAL 2 TIMES DAILY PRN
Qty: 60 TABLET | Refills: 0 | Status: SHIPPED | OUTPATIENT
Start: 2025-07-08 | End: 2025-08-07

## 2025-07-08 NOTE — PROGRESS NOTES
Additionally, she requests a refill of her trazodone prescription.    She has been experiencing frequent urination. Her urinalysis was normal.    She recalls an incident where she experienced sudden pain that radiated down her arm while dining with her family. The pain, described as different from her usual discomfort, lasted for a few minutes. During this episode, she had difficulty moving her hand and was unable to speak due to being overwhelmed. She has experienced a similar episode once before. She reports no history of blood clots.    She did not have to use her inhaler while in [place] but resumed its use upon returning home.    She has not heard from the surgeon regarding her surgery.        Past medical history reviewed  Medication review  Allergies reviewed  Social history reviewed  Last PDMP Marked as Reviewed:    Review User Review Instant Review Result   KIMBER ESCALANTE 7/8/2025  9:50 AM Reviewed PDMP [1]       Objective       /62 (BP Site: Right Upper Arm, Patient Position: Sitting, BP Cuff Size: Medium Adult)   Pulse 81   Resp 20   Wt 59.1 kg (130 lb 6.4 oz)   SpO2 94%   BMI 26.34 kg/m²       Physical Exam  Vitals and nursing note reviewed.   Constitutional:       General: She is not in acute distress.     Appearance: Normal appearance. She is not ill-appearing or toxic-appearing.   Cardiovascular:      Rate and Rhythm: Normal rate and regular rhythm.      Pulses: Normal pulses.      Heart sounds: Normal heart sounds. No murmur heard.  Pulmonary:      Effort: Pulmonary effort is normal. No respiratory distress.      Breath sounds: Normal breath sounds. No wheezing or rhonchi.   Musculoskeletal:      Cervical back: Normal.      Thoracic back: Normal.      Lumbar back: Tenderness present. No swelling, edema or bony tenderness.        Back:    Neurological:      General: No focal deficit present.      Mental Status: She is alert and oriented to person, place, and time.   Psychiatric:

## (undated) DEVICE — CLASSIC CLD THER SYS

## (undated) DEVICE — 4-PORT MANIFOLD: Brand: NEPTUNE 2

## (undated) DEVICE — SUTURE VICRYL SZ 2-0 L18IN ABSRB UD CT-1 L36MM 1/2 CIR J839D

## (undated) DEVICE — SOLUTION IV IRRIG WATER 1000ML POUR BRL 2F7114

## (undated) DEVICE — HOOD WITH PEEL AWAY FACE SHIELD: Brand: T7PLUS

## (undated) DEVICE — GLOVE SURG SZ 8 L12IN THK75MIL DK GRN LTX FREE

## (undated) DEVICE — SYSTEM SKIN CLSR 22CM DERMBND PRINEO

## (undated) DEVICE — Device: Brand: POWER-FLO®

## (undated) DEVICE — COVER,TABLE,44X90,STERILE: Brand: MEDLINE

## (undated) DEVICE — 2108 SERIES SAGITTAL FAN BLADE (33.0 X 0.88 X 64.0MM)

## (undated) DEVICE — ZIMMER® STERILE DISPOSABLE TOURNIQUET CUFF WITH PLC, DUAL PORT, SINGLE BLADDER, 34 IN. (86 CM)

## (undated) DEVICE — Z DISCONTINUED USE 2220326 SUTURE VICRYL SZ 1 L27IN ABSRB UD L36MM CT-1 1/2 CIR JJ40G

## (undated) DEVICE — ELECTRODE ES AD CRDLSS PT RET REM POLYHESIVE

## (undated) DEVICE — SUTURE FIBERWIRE SZ 5 L38IN NONABSORBABLE BLU L48MM 1/2 AR7211

## (undated) DEVICE — CURETTE SURG FOR BNE CEM REM QUIK USE

## (undated) DEVICE — HOOD: Brand: T7PLUS

## (undated) DEVICE — GLOVE ORANGE PI 7 1/2   MSG9075

## (undated) DEVICE — STRYKER PERFORMANCE SERIES SAGITTAL BLADE: Brand: STRYKER PERFORMANCE SERIES

## (undated) DEVICE — SUTURE MONOCRYL ABSORBABLE 3-0 PS-1 18 IN UD MONOCRYL + STRATAFIX SXMP1B102

## (undated) DEVICE — Device

## (undated) DEVICE — TOWEL,OR,DSP,ST,BLUE,STD,6/PK,12PK/CS: Brand: MEDLINE

## (undated) DEVICE — SCREW BNE L25MM DIA2.5MM KNEE FULL THRD HEX FEM PERSONA (NOT IMPLANTED)

## (undated) DEVICE — ZIMMER® STERILE DISPOSABLE TOURNIQUET CUFF WITH PLC, DUAL PORT, SINGLE BLADDER, 30 IN. (76 CM)

## (undated) DEVICE — APPLICATOR MEDICATED 26 CC SOLUTION HI LT ORNG CHLORAPREP

## (undated) DEVICE — SURGICAL PROCEDURE PACK TOT KNEE LF